# Patient Record
Sex: MALE | Race: WHITE | NOT HISPANIC OR LATINO | Employment: OTHER | URBAN - METROPOLITAN AREA
[De-identification: names, ages, dates, MRNs, and addresses within clinical notes are randomized per-mention and may not be internally consistent; named-entity substitution may affect disease eponyms.]

---

## 2017-01-26 ENCOUNTER — APPOINTMENT (OUTPATIENT)
Dept: SLEEP MEDICINE | Facility: HOSPITAL | Age: 67
End: 2017-01-26

## 2017-01-27 ENCOUNTER — OFFICE VISIT (OUTPATIENT)
Dept: CARDIOLOGY | Facility: CLINIC | Age: 67
End: 2017-01-27

## 2017-01-27 VITALS
HEART RATE: 110 BPM | WEIGHT: 206 LBS | DIASTOLIC BLOOD PRESSURE: 76 MMHG | BODY MASS INDEX: 27.9 KG/M2 | SYSTOLIC BLOOD PRESSURE: 118 MMHG | HEIGHT: 72 IN

## 2017-01-27 DIAGNOSIS — I10 ESSENTIAL HYPERTENSION: Primary | ICD-10-CM

## 2017-01-27 DIAGNOSIS — G47.33 OBSTRUCTIVE SLEEP APNEA SYNDROME: ICD-10-CM

## 2017-01-27 DIAGNOSIS — E78.5 HYPERLIPIDEMIA, UNSPECIFIED HYPERLIPIDEMIA TYPE: ICD-10-CM

## 2017-01-27 DIAGNOSIS — E11.9 TYPE 2 DIABETES MELLITUS WITHOUT COMPLICATION, WITHOUT LONG-TERM CURRENT USE OF INSULIN (HCC): ICD-10-CM

## 2017-01-27 PROCEDURE — 93000 ELECTROCARDIOGRAM COMPLETE: CPT | Performed by: INTERNAL MEDICINE

## 2017-01-27 PROCEDURE — 99214 OFFICE O/P EST MOD 30 MIN: CPT | Performed by: INTERNAL MEDICINE

## 2017-01-27 RX ORDER — LISINOPRIL 10 MG/1
10 TABLET ORAL DAILY
COMMUNITY
End: 2017-04-21 | Stop reason: SDUPTHER

## 2017-01-27 NOTE — MR AVS SNAPSHOT
Mike Cox   1/27/2017 1:00 PM   Office Visit    Dept Phone:  [phone]   Encounter #:  [id number]    Provider:  Isabelle Flores MD   Department:  [facility] CARDIOLOGY                Your Full Care Plan              Today's Medication Changes          These changes are accurate as of: 1/27/17  1:33 PM.  If you have any questions, ask your nurse or doctor.               Medication(s)that have changed:     Empagliflozin 25 MG tablet   Take  by mouth.   What changed:  Another medication with the same name was removed. Continue taking this medication, and follow the directions you see here.   Changed by:  Isabelle Flores MD       lisinopril 10 MG tablet   Commonly known as:  PRINIVIL,ZESTRIL   Take 10 mg by mouth Daily.   What changed:  Another medication with the same name was removed. Continue taking this medication, and follow the directions you see here.   Changed by:  Isabelle Flores MD       LOVAZA PO   Take 4 mg by mouth daily.   What changed:  Another medication with the same name was removed. Continue taking this medication, and follow the directions you see here.   Changed by:  Svetlana Ornelas MD         Stop taking medication(s)listed here:     Lidocaine (Anorectal) 5 % cream cream   Commonly known as:  LMX 5   Stopped by:  Isabelle Flores MD           sulfamethoxazole-trimethoprim 800-160 MG per tablet   Commonly known as:  BACTRIM DS   Stopped by:  Isabelle Flores MD                      Your Updated Medication List          This list is accurate as of: 1/27/17  1:33 PM.  Always use your most recent med list.                albuterol 108 (90 BASE) MCG/ACT inhaler   Commonly known as:  PROVENTIL HFA;VENTOLIN HFA   Inhale 2 puffs Every 4 (Four) Hours As Needed for wheezing.       ASPIRIN ADULT LOW STRENGTH 81 MG EC tablet   Generic drug:  aspirin       atorvastatin 80 MG tablet   Commonly known as:  LIPITOR   TAKE 1  TABLET BY MOUTH AT BEDTIME       Empagliflozin 25 MG tablet       glimepiride 4 MG tablet   Commonly known as:  AMARYL       JENTADUETO 2.5-1000 MG tablet   Generic drug:  Linagliptin-Metformin HCl       lisinopril 10 MG tablet   Commonly known as:  PRINIVIL,ZESTRIL       LOVAZA PO       VITAMIN B12 PO       Vitamin D3 2000 UNITS tablet               We Performed the Following     ECG 12 Lead       You Were Diagnosed With        Codes Comments    Essential hypertension    -  Primary ICD-10-CM: I10  ICD-9-CM: 401.9     Hyperlipidemia, unspecified hyperlipidemia type     ICD-10-CM: E78.5  ICD-9-CM: 272.4     Obstructive sleep apnea syndrome     ICD-10-CM: G47.33  ICD-9-CM: 327.23     Type 2 diabetes mellitus without complication, without long-term current use of insulin     ICD-10-CM: E11.9  ICD-9-CM: 250.00       Instructions     None    Patient Instructions History      Upcoming Appointments     Visit Type Date Time Department    FOLLOW UP 2017  1:00 PM [facility]    POLYSOMNOGRAM 2017  8:30 PM [facility]EP [facility]    OFFICE VISIT 3/15/2017 10:45 AM [facility]    FOLLOW UP 2017  1:00 PM [facility]      [employer] Signup     [facility] Hadapt allows you to send messages to your doctor, view your test results, renew your prescriptions, schedule appointments, and more. To sign up, go to [employer]ET[employer] and click on the Sign Up Now link in the New User? box. Enter your Hadapt Activation Code exactly as it appears below along with the last four digits of your Social Security Number and your Date of Birth () to complete the sign-up process. If you do not sign up before the expiration date, you must request a new code.    Hadapt Activation Code: [email or web address]  Expires: 2017  5:36 AM    If you have questions, you can email [email or web address] or call [phone] to talk to our Hadapt staff. Remember, Hadapt is NOT to be used for urgent needs. For  "medical emergencies, dial 911.               Other Info from Your Visit           Your Appointments     Feb 12, 2017  8:30 PM EST   [facility]   [facility] ([place])    [address] [facility] [address] [phone]            Mar 15, 2017 10:45 AM EDT   Office Visit with Allyson Salas MD   [facility] PRIMARY CARE (--)    [address]  [place] [facility] [address] [phone]           Arrive 15 minutes prior to appointment.            Jul 28, 2017  1:00 PM EDT   Follow Up with Isabelle Flores MD   [facility] CARDIOLOGY (--)    [address]  [facility] [address] [phone]           Arrive 15 minutes prior to appointment.              Allergies     No Known Allergies      Vital Signs     Blood Pressure Pulse Height Weight Body Mass Index Smoking Status    118/76 110 72\" (182.9 cm) 206 lb (93.4 kg) 27.94 kg/m2 Former Smoker      Problems and Diagnoses Noted     High cholesterol or triglycerides    High blood pressure    Sleep apnea    Type 2 diabetes      Results         "

## 2017-01-27 NOTE — PROGRESS NOTES
Date of Office Visit: 2017  Encounter Provider: Isabelle Flores MD  Place of Service: [facility] CARDIOLOGY  Patient Name: Mike Cox  :1950      Patient ID:  Mike Cox is a 66 y.o. male is here for  followup for CAD.         History of Present Illness  He has hypertension, hyperlipidemia, diabetes mellitus type 2, and obstructive sleep  apnea. He first presented to the office for chest pain. Because of his cardiovascular  risks, he had an exercise nuclear stress study done in  which was negative for  ischemia. He had a lot of fatigue at that time and palpitations which were treated and  got better. His Holter recording done in 2008 showed premature ventricular complexes.   He had vascular screening done on 2009 which was normal. He had a cardiac duplex   study performed on 2011 which showed moderate intimal thickening of the right   common carotid artery with mild thickening of that carotid bulb but no thickening of his stenosis.   He presented in 2008 with fatigue and had a stress nuclear perfusion study showing no evidence   of ischemia. His echocardiogram then showed an ejection fraction of 65% with grade I diastolic dysfunction  and no significant valvular heart problems.      His last echocardiogram done 2011, showed an ejection fraction of 54% with grade 1  diastolic dysfunction and no significant valvular heart problems. He had a stress nuclear 2012  at [facility] and had no ischemia.     He sees Dr. Ireland from endocrinology for his lipid management. He did see Dr. Sampson for   PAULETTE and COPD.        He had some mild dyspnea with exertion at his last visit and called and said it  was getting even worse, so he had a stress nuclear perfusion study done on 2015,  showing no evidence of ischemia and ejection fraction of 57%. He had a 2-D echocardiogram  with Doppler done on 2014, showing an ejection fraction of  54% and grade 1 diastolic  dysfunction. He had carotid duplex studies done on 04/28/2015, which showed moderate  intimal thickening of the right internal carotid artery and moderate plaque in the left  internal carotid artery.     He had vascular screening done on 03/20/2016, which showed plaquing in both carotid bulbs but it was otherwise unremarkable.        He had labs on 12/08/2016 which revealed a hemoglobin A1c of 7.43, normal CMP, total cholesterol 121, triglycerides 123, HDL 45, LDL 49 and CBC normal.       He is not exercising.  His weight has been stable.  His hemoglobin A1c continues to be elevated, so they are about to9 start him on insulin.  He has had no tachycardia, dizziness, or syncope.  He has no lower extremity edema, orthopnea, or paroxysmal nocturnal dyspnea.  He has dyspnea on exertion, specifically going up stairs or inclines, but part of that is because he is not exercising and he is very deconditioned.  He has no nausea, vomiting, fevers, or chills.  He has, overall, been stable.  He has had a lot of fatigue.  Dr. Sampson is retired and so he is in the process of getting another sleep study with another pulmonologist.          Past Medical History   Diagnosis Date   • Diabetes mellitus      pt reports A1C of 7.1 in 3/2016   • High cholesterol    • History of hepatitis C      In remission after Interferon and Ribavirin treatment   • Hypertension    • Vitamin D deficiency          Past Surgical History   Procedure Laterality Date   • Colonoscopy N/A 09/10/2013     Normal, Repeat in 10 years, Dr. Ornelas   • Shoulder surgery  1995     BONE SPURS       Current Outpatient Prescriptions on File Prior to Visit   Medication Sig Dispense Refill   • albuterol (PROVENTIL HFA;VENTOLIN HFA) 108 (90 BASE) MCG/ACT inhaler Inhale 2 puffs Every 4 (Four) Hours As Needed for wheezing. 8 g 0   • aspirin (ASPIRIN ADULT LOW STRENGTH) 81 MG EC tablet Take 1 tablet by mouth daily.     • atorvastatin (LIPITOR) 80 MG  tablet TAKE 1 TABLET BY MOUTH AT BEDTIME 90 tablet 2   • Cholecalciferol (VITAMIN D3) 2000 UNITS tablet Take 2,000 Units by mouth daily.     • Cyanocobalamin (VITAMIN B12 PO) Take 5,000 mcg by mouth daily.     • glimepiride (AMARYL) 4 MG tablet Take 1 tablet by mouth 2 (two) times a day before meals.  5   • Linagliptin-Metformin HCl (JENTADUETO) 2.5-1000 MG tablet Take 1 tablet by mouth 2 (two) times a day.     • Omega-3-acid Ethyl Esters (LOVAZA PO) Take 4 mg by mouth daily.     • [DISCONTINUED] Empagliflozin 10 MG tablet Take  by mouth.     • [DISCONTINUED] Lidocaine, Anorectal, (LMX 5) 5 % cream cream Apply  topically 3 (three) times a day as needed (pain or itching). 15 g 0   • [DISCONTINUED] lisinopril (PRINIVIL,ZESTRIL) 20 MG tablet TAKE 1/2 TABLET BY MOUTH EVERY MORNING 90 tablet 0   • [DISCONTINUED] Omega-3-acid Ethyl Esters (LOVAZA PO) Take 4 mg by mouth.     • [DISCONTINUED] sulfamethoxazole-trimethoprim (BACTRIM DS) 800-160 MG per tablet Take 1 tablet by mouth 2 (Two) Times a Day. 14 tablet 0     No current facility-administered medications on file prior to visit.        Social History     Social History   • Marital status:      Spouse name: N/A   • Number of children: N/A   • Years of education: N/A     Occupational History   • Not on file.     Social History Main Topics   • Smoking status: Former Smoker     Packs/day: 1.00     Years: 20.00     Types: Cigarettes     Start date: 1966     Quit date: 1986   • Smokeless tobacco: Never Used   • Alcohol use No   • Drug use: No   • Sexual activity: Not on file     Other Topics Concern   • Not on file     Social History Narrative           Review of Systems   Constitution: Negative.   HENT: Negative for congestion and headaches.    Eyes: Negative for vision loss in left eye and vision loss in right eye.   Respiratory: Negative.  Negative for cough, hemoptysis, shortness of breath, sleep disturbances due to breathing, snoring, sputum production and  "wheezing.    Endocrine: Negative.    Hematologic/Lymphatic: Negative.    Skin: Negative for poor wound healing and rash.   Musculoskeletal: Negative for falls, gout, muscle cramps and myalgias.   Gastrointestinal: Negative for abdominal pain, diarrhea, dysphagia, hematemesis, melena, nausea and vomiting.   Neurological: Negative for excessive daytime sleepiness, dizziness, light-headedness, loss of balance, seizures and vertigo.   Psychiatric/Behavioral: Negative for depression and substance abuse. The patient is not nervous/anxious.        Procedures    ECG 12 Lead  Date/Time: 1/27/2017 1:17 PM  Performed by: JOSE CHANG  Authorized by: JOSE CHANG   Rhythm: sinus tachycardia  Conduction: LAFB  Clinical impression: abnormal ECG               Objective:      Vitals:    01/27/17 1305   BP: 118/76   Pulse: 110   Weight: 206 lb (93.4 kg)   Height: 72\" (182.9 cm)     Body mass index is 27.94 kg/(m^2).    Physical Exam   Constitutional: He is oriented to person, place, and time. He appears well-developed and well-nourished. No distress.   HENT:   Head: Normocephalic and atraumatic.   Eyes: Conjunctivae are normal. No scleral icterus.   Neck: Neck supple. No JVD present. Carotid bruit is not present. No thyromegaly present.   Cardiovascular: Normal rate, regular rhythm, S1 normal, S2 normal, normal heart sounds and intact distal pulses.   No extrasystoles are present. PMI is not displaced.  Exam reveals no gallop.    No murmur heard.  Pulses:       Carotid pulses are 2+ on the right side, and 2+ on the left side.       Radial pulses are 2+ on the right side, and 2+ on the left side.        Dorsalis pedis pulses are 2+ on the right side, and 2+ on the left side.        Posterior tibial pulses are 2+ on the right side, and 2+ on the left side.   Pulmonary/Chest: Effort normal and breath sounds normal. No respiratory distress. He has no wheezes. He has no rhonchi. He has no rales. He exhibits no tenderness. "   Abdominal: Soft. Bowel sounds are normal. He exhibits no distension, no abdominal bruit and no mass. There is no tenderness.   Musculoskeletal: He exhibits no edema or deformity.   Lymphadenopathy:     He has no cervical adenopathy.   Neurological: He is alert and oriented to person, place, and time. No cranial nerve deficit.   Skin: Skin is warm and dry. No rash noted. He is not diaphoretic. No cyanosis. No pallor. Nails show no clubbing.   Psychiatric: He has a normal mood and affect. Judgment normal.   Vitals reviewed.      Lab Review:       Assessment:      Diagnosis Plan   1. Essential hypertension     2. Hyperlipidemia, unspecified hyperlipidemia type     3. Obstructive sleep apnea syndrome     4. Type 2 diabetes mellitus without complication, without long-term current use of insulin          1. Normal stress perfusion study 8/2012 and 1/2015.  2. Abnormal ECG. Left anterior fascicular block which is chronic.  3. Hypertension, under good control.  4. Diabetes mellitus type 2. Stable.  5. Obstructive sleep apnea on CPAP. Getting retested for this.  6. Hyperlipidemia.   7. Hepatitis C, stable.  8. History of rheumatoid fever, stable. No valvular heart problems.   9. History of gout.  10. Plaquing of the left carotid artery.  11. Fatigue, likely due to diabetes and meds.      Plan:       See back in 6 months, no med changes, advised exercise.

## 2017-02-08 ENCOUNTER — TRANSCRIBE ORDERS (OUTPATIENT)
Dept: SLEEP MEDICINE | Facility: HOSPITAL | Age: 67
End: 2017-02-08

## 2017-02-08 DIAGNOSIS — G47.33 OSA (OBSTRUCTIVE SLEEP APNEA): Primary | ICD-10-CM

## 2017-02-12 ENCOUNTER — HOSPITAL ENCOUNTER (OUTPATIENT)
Dept: SLEEP MEDICINE | Facility: HOSPITAL | Age: 67
Discharge: HOME | End: 2017-02-12
Admitting: INTERNAL MEDICINE

## 2017-02-12 DIAGNOSIS — G47.33 OSA (OBSTRUCTIVE SLEEP APNEA): ICD-10-CM

## 2017-02-12 PROCEDURE — 95810 POLYSOM 6/> YRS 4/> PARAM: CPT

## 2017-03-13 DIAGNOSIS — I15.9 SECONDARY HYPERTENSION: Primary | ICD-10-CM

## 2017-03-13 DIAGNOSIS — I15.9 SECONDARY HYPERTENSION: ICD-10-CM

## 2017-03-13 DIAGNOSIS — E78.5 HYPERLIPIDEMIA, UNSPECIFIED HYPERLIPIDEMIA TYPE: ICD-10-CM

## 2017-03-13 DIAGNOSIS — E11.9 TYPE 2 DIABETES MELLITUS WITHOUT COMPLICATION, WITHOUT LONG-TERM CURRENT USE OF INSULIN (HCC): ICD-10-CM

## 2017-03-27 ENCOUNTER — TELEPHONE (OUTPATIENT)
Dept: SLEEP MEDICINE | Facility: HOSPITAL | Age: 67
End: 2017-03-27

## 2017-03-27 NOTE — TELEPHONE ENCOUNTER
Spoke with [facility].  Pt s/u to f/u @ [facility] on 3/29/17.  Study info faxed to [facility] as well.

## 2017-04-18 LAB
ALBUMIN SERPL-MCNC: 4.5 G/DL (ref 3.5–5.2)
ALBUMIN/GLOB SERPL: 1.9 G/DL
ALP SERPL-CCNC: 94 U/L (ref 39–117)
ALT SERPL-CCNC: 28 U/L (ref 1–41)
AST SERPL-CCNC: 42 U/L (ref 1–40)
BASOPHILS # BLD AUTO: 0.02 10*3/MM3 (ref 0–0.2)
BASOPHILS NFR BLD AUTO: 0.3 % (ref 0–1.5)
BILIRUB SERPL-MCNC: 0.7 MG/DL (ref 0.1–1.2)
BUN SERPL-MCNC: 9 MG/DL (ref 8–23)
BUN/CREAT SERPL: 10.7 (ref 7–25)
CALCIUM SERPL-MCNC: 9.4 MG/DL (ref 8.6–10.5)
CHLORIDE SERPL-SCNC: 98 MMOL/L (ref 98–107)
CHOLEST SERPL-MCNC: 120 MG/DL (ref 0–200)
CO2 SERPL-SCNC: 26.1 MMOL/L (ref 22–29)
CREAT SERPL-MCNC: 0.84 MG/DL (ref 0.76–1.27)
EOSINOPHIL # BLD AUTO: 0.27 10*3/MM3 (ref 0–0.7)
EOSINOPHIL NFR BLD AUTO: 4.3 % (ref 0.3–6.2)
ERYTHROCYTE [DISTWIDTH] IN BLOOD BY AUTOMATED COUNT: 13.6 % (ref 11.5–14.5)
GLOBULIN SER CALC-MCNC: 2.4 GM/DL
GLUCOSE SERPL-MCNC: 170 MG/DL (ref 65–99)
HBA1C MFR BLD: 7 % (ref 4.8–5.6)
HCT VFR BLD AUTO: 46.2 % (ref 40.4–52.2)
HDLC SERPL-MCNC: 47 MG/DL (ref 40–60)
HGB BLD-MCNC: 15.4 G/DL (ref 13.7–17.6)
IMM GRANULOCYTES # BLD: 0 10*3/MM3 (ref 0–0.03)
IMM GRANULOCYTES NFR BLD: 0 % (ref 0–0.5)
LDLC SERPL CALC-MCNC: 51 MG/DL (ref 0–100)
LDLC/HDLC SERPL: 1.09 {RATIO}
LYMPHOCYTES # BLD AUTO: 1.65 10*3/MM3 (ref 0.9–4.8)
LYMPHOCYTES NFR BLD AUTO: 26.1 % (ref 19.6–45.3)
MCH RBC QN AUTO: 30.1 PG (ref 27–32.7)
MCHC RBC AUTO-ENTMCNC: 33.3 G/DL (ref 32.6–36.4)
MCV RBC AUTO: 90.4 FL (ref 79.8–96.2)
MONOCYTES # BLD AUTO: 0.69 10*3/MM3 (ref 0.2–1.2)
MONOCYTES NFR BLD AUTO: 10.9 % (ref 5–12)
NEUTROPHILS # BLD AUTO: 3.7 10*3/MM3 (ref 1.9–8.1)
NEUTROPHILS NFR BLD AUTO: 58.4 % (ref 42.7–76)
PLATELET # BLD AUTO: 214 10*3/MM3 (ref 140–500)
POTASSIUM SERPL-SCNC: 4.3 MMOL/L (ref 3.5–5.2)
PROT SERPL-MCNC: 6.9 G/DL (ref 6–8.5)
RBC # BLD AUTO: 5.11 10*6/MM3 (ref 4.6–6)
SODIUM SERPL-SCNC: 140 MMOL/L (ref 136–145)
TRIGL SERPL-MCNC: 109 MG/DL (ref 0–150)
VLDLC SERPL CALC-MCNC: 21.8 MG/DL (ref 5–40)
WBC # BLD AUTO: 6.33 10*3/MM3 (ref 4.5–10.7)

## 2017-04-21 ENCOUNTER — OFFICE VISIT (OUTPATIENT)
Dept: FAMILY MEDICINE CLINIC | Facility: CLINIC | Age: 67
End: 2017-04-21

## 2017-04-21 VITALS
WEIGHT: 213.7 LBS | HEART RATE: 82 BPM | DIASTOLIC BLOOD PRESSURE: 96 MMHG | BODY MASS INDEX: 28.32 KG/M2 | OXYGEN SATURATION: 98 % | HEIGHT: 73 IN | SYSTOLIC BLOOD PRESSURE: 145 MMHG | TEMPERATURE: 97 F

## 2017-04-21 DIAGNOSIS — E78.5 HYPERLIPIDEMIA, UNSPECIFIED HYPERLIPIDEMIA TYPE: Primary | ICD-10-CM

## 2017-04-21 DIAGNOSIS — B18.2 CHRONIC HEPATITIS C WITHOUT HEPATIC COMA (HCC): ICD-10-CM

## 2017-04-21 DIAGNOSIS — E11.9 TYPE 2 DIABETES MELLITUS WITHOUT COMPLICATION, WITHOUT LONG-TERM CURRENT USE OF INSULIN (HCC): ICD-10-CM

## 2017-04-21 DIAGNOSIS — R79.89 ELEVATED LFTS: ICD-10-CM

## 2017-04-21 DIAGNOSIS — I10 ESSENTIAL HYPERTENSION: ICD-10-CM

## 2017-04-21 PROCEDURE — 99214 OFFICE O/P EST MOD 30 MIN: CPT | Performed by: INTERNAL MEDICINE

## 2017-04-21 RX ORDER — BLOOD SUGAR DIAGNOSTIC
STRIP MISCELLANEOUS
Refills: 6 | COMMUNITY
Start: 2017-02-08

## 2017-04-21 RX ORDER — CHLORHEXIDINE GLUCONATE 0.12 MG/ML
RINSE ORAL
Refills: 2 | COMMUNITY
Start: 2017-04-10 | End: 2017-05-19

## 2017-04-21 RX ORDER — TAMSULOSIN HYDROCHLORIDE 0.4 MG/1
CAPSULE ORAL
Refills: 3 | COMMUNITY
Start: 2017-03-23 | End: 2017-05-19 | Stop reason: SDUPTHER

## 2017-04-21 RX ORDER — MELOXICAM 15 MG/1
TABLET ORAL
Refills: 2 | COMMUNITY
Start: 2017-04-10 | End: 2017-05-19 | Stop reason: SDUPTHER

## 2017-04-21 RX ORDER — INSULIN GLARGINE 100 [IU]/ML
12 INJECTION, SOLUTION SUBCUTANEOUS NIGHTLY
Refills: 5 | COMMUNITY
Start: 2017-02-28 | End: 2018-06-20 | Stop reason: ALTCHOICE

## 2017-04-21 RX ORDER — PEN NEEDLE, DIABETIC 32GX 5/32"
NEEDLE, DISPOSABLE MISCELLANEOUS
Refills: 5 | COMMUNITY
Start: 2017-02-28 | End: 2018-06-20 | Stop reason: ALTCHOICE

## 2017-04-21 RX ORDER — BUPROPION HYDROCHLORIDE 150 MG/1
150 TABLET ORAL EVERY MORNING
Qty: 90 TABLET | Refills: 2 | Status: SHIPPED | OUTPATIENT
Start: 2017-04-21 | End: 2017-10-25

## 2017-04-21 RX ORDER — LANCETS
EACH MISCELLANEOUS
Refills: 5 | COMMUNITY
Start: 2017-03-02

## 2017-04-21 RX ORDER — LISINOPRIL 10 MG/1
10 TABLET ORAL DAILY
Qty: 90 TABLET | Refills: 1 | Status: SHIPPED | OUTPATIENT
Start: 2017-04-21 | End: 2017-10-10 | Stop reason: DRUGHIGH

## 2017-04-21 RX ORDER — ATORVASTATIN CALCIUM 20 MG/1
20 TABLET, FILM COATED ORAL DAILY
Qty: 90 TABLET | Refills: 1 | Status: SHIPPED | OUTPATIENT
Start: 2017-04-21 | End: 2017-10-09 | Stop reason: SDUPTHER

## 2017-04-21 RX ORDER — BUPROPION HYDROCHLORIDE 150 MG/1
TABLET ORAL
Refills: 2 | COMMUNITY
Start: 2017-03-24 | End: 2017-04-21 | Stop reason: SDUPTHER

## 2017-04-21 NOTE — PROGRESS NOTES
Subjective   Mike Cox is a 66 y.o. male who comes in today for   Chief Complaint   Patient presents with   • Diabetes     no complaints he did say he sees an endo he did start insulin   .    History of Present Illness   Here for f/u on HTN, HL and depression; Hep C + now in remission who on recent labs had LFT elevated.  He did have a bad gastroenteritis 3 days prior to labs..  Recent over night sleep study showed hypoxia and is followed by pulm and getting set up for overnight oxygen but doesn't have PAULETTE.  wellbutrin working well for depressive fatigue.  Feels happy.  psa is followed by urology and he checks prostate as well.  Needs some refills  The following portions of the patient's history were reviewed and updated as appropriate: allergies, current medications, past family history, past medical history, past social history, past surgical history and problem list.    Review of Systems   Constitutional: Negative.    Gastrointestinal: Positive for vomiting (over the weekend due to virus but is fine now).   Psychiatric/Behavioral: Negative.        Vitals:    04/21/17 1041   BP: 145/96   Pulse: 82   Temp: 97 °F (36.1 °C)   SpO2: 98%       Objective   Physical Exam   Constitutional: He is oriented to person, place, and time. He appears well-developed and well-nourished.   HENT:   Head: Normocephalic and atraumatic.   Right Ear: External ear normal.   Left Ear: External ear normal.   Mouth/Throat: Oropharynx is clear and moist.   Eyes: Conjunctivae are normal.   Neck: Neck supple.   Cardiovascular: Normal rate, regular rhythm and normal heart sounds.    Pulmonary/Chest: Effort normal and breath sounds normal.   Abdominal: Soft. Bowel sounds are normal.   Neurological: He is alert and oriented to person, place, and time.   Skin: Skin is warm.   Psychiatric: He has a normal mood and affect. His behavior is normal. Judgment and thought content normal.   Nursing note and vitals reviewed.      Assessment/Plan    Mike was seen today for diabetes.    Diagnoses and all orders for this visit:    Hyperlipidemia, unspecified hyperlipidemia type    Essential hypertension    Type 2 diabetes mellitus without complication, without long-term current use of insulin    Other orders  -     atorvastatin (LIPITOR) 20 MG tablet; Take 1 tablet by mouth Daily.  -     buPROPion XL (WELLBUTRIN XL) 150 MG 24 hr tablet; Take 1 tablet by mouth Every Morning.  -     lisinopril (PRINIVIL,ZESTRIL) 10 MG tablet; Take 1 tablet by mouth Daily.    decrease lipitor from 80 to 20mg qd due to labs looking so good and LFT elevation  Recheck cmp in 2 mo  Think it is related to recent stomach virus  Refill other meds and HTN and depression stable               I have asked for the patient to return to clinic in 6month(s).

## 2017-04-22 PROBLEM — R79.89 ELEVATED LFTS: Status: ACTIVE | Noted: 2017-04-22

## 2017-04-26 RX ORDER — LISINOPRIL 10 MG/1
10 TABLET ORAL DAILY
Qty: 90 TABLET | Refills: 0 | Status: SHIPPED | OUTPATIENT
Start: 2017-04-26 | End: 2017-05-19 | Stop reason: SDUPTHER

## 2017-05-17 ENCOUNTER — APPOINTMENT (OUTPATIENT)
Dept: PREADMISSION TESTING | Facility: HOSPITAL | Age: 67
End: 2017-05-17

## 2017-05-18 ENCOUNTER — TELEPHONE (OUTPATIENT)
Dept: CARDIOLOGY | Facility: CLINIC | Age: 67
End: 2017-05-18

## 2017-05-19 ENCOUNTER — APPOINTMENT (OUTPATIENT)
Dept: PREADMISSION TESTING | Facility: HOSPITAL | Age: 67
End: 2017-05-19

## 2017-05-19 VITALS
BODY MASS INDEX: 28.36 KG/M2 | DIASTOLIC BLOOD PRESSURE: 89 MMHG | WEIGHT: 209.4 LBS | OXYGEN SATURATION: 97 % | TEMPERATURE: 97.9 F | HEART RATE: 96 BPM | RESPIRATION RATE: 16 BRPM | SYSTOLIC BLOOD PRESSURE: 139 MMHG | HEIGHT: 72 IN

## 2017-05-19 LAB
ANION GAP SERPL CALCULATED.3IONS-SCNC: 17 MMOL/L
BUN BLD-MCNC: 13 MG/DL (ref 8–23)
BUN/CREAT SERPL: 14 (ref 7–25)
CALCIUM SPEC-SCNC: 9.7 MG/DL (ref 8.6–10.5)
CHLORIDE SERPL-SCNC: 96 MMOL/L (ref 98–107)
CO2 SERPL-SCNC: 25 MMOL/L (ref 22–29)
CREAT BLD-MCNC: 0.93 MG/DL (ref 0.76–1.27)
DEPRECATED RDW RBC AUTO: 42.1 FL (ref 37–54)
ERYTHROCYTE [DISTWIDTH] IN BLOOD BY AUTOMATED COUNT: 13.3 % (ref 11.5–14.5)
GFR SERPL CREATININE-BSD FRML MDRD: 81 ML/MIN/1.73
GLUCOSE BLD-MCNC: 96 MG/DL (ref 65–99)
HCT VFR BLD AUTO: 44.9 % (ref 40.4–52.2)
HGB BLD-MCNC: 15.9 G/DL (ref 13.7–17.6)
MCH RBC QN AUTO: 30.7 PG (ref 27–32.7)
MCHC RBC AUTO-ENTMCNC: 35.4 G/DL (ref 32.6–36.4)
MCV RBC AUTO: 86.7 FL (ref 79.8–96.2)
PLATELET # BLD AUTO: 226 10*3/MM3 (ref 140–500)
PMV BLD AUTO: 9.7 FL (ref 6–12)
POTASSIUM BLD-SCNC: 4.6 MMOL/L (ref 3.5–5.2)
RBC # BLD AUTO: 5.18 10*6/MM3 (ref 4.6–6)
SODIUM BLD-SCNC: 138 MMOL/L (ref 136–145)
WBC NRBC COR # BLD: 11.6 10*3/MM3 (ref 4.5–10.7)

## 2017-05-19 PROCEDURE — 36415 COLL VENOUS BLD VENIPUNCTURE: CPT

## 2017-05-19 PROCEDURE — 85027 COMPLETE CBC AUTOMATED: CPT | Performed by: ORTHOPAEDIC SURGERY

## 2017-05-19 PROCEDURE — 80048 BASIC METABOLIC PNL TOTAL CA: CPT | Performed by: ORTHOPAEDIC SURGERY

## 2017-05-19 RX ORDER — MELOXICAM 15 MG/1
15 TABLET ORAL DAILY
COMMUNITY
End: 2017-10-25

## 2017-05-19 RX ORDER — TAMSULOSIN HYDROCHLORIDE 0.4 MG/1
2 CAPSULE ORAL DAILY
COMMUNITY
End: 2018-04-23

## 2017-05-22 ENCOUNTER — TELEPHONE (OUTPATIENT)
Dept: FAMILY MEDICINE CLINIC | Facility: CLINIC | Age: 67
End: 2017-05-22

## 2017-05-24 DIAGNOSIS — D72.829 LEUKOCYTOSIS, UNSPECIFIED TYPE: Primary | ICD-10-CM

## 2017-05-25 LAB
BASOPHILS # BLD AUTO: 0.06 10*3/MM3 (ref 0–0.2)
BASOPHILS NFR BLD AUTO: 0.6 % (ref 0–1.5)
EOSINOPHIL # BLD AUTO: 0.29 10*3/MM3 (ref 0–0.7)
EOSINOPHIL NFR BLD AUTO: 3 % (ref 0.3–6.2)
ERYTHROCYTE [DISTWIDTH] IN BLOOD BY AUTOMATED COUNT: 13.4 % (ref 11.5–14.5)
HCT VFR BLD AUTO: 47.3 % (ref 40.4–52.2)
HGB BLD-MCNC: 16.3 G/DL (ref 13.7–17.6)
IMM GRANULOCYTES # BLD: 0.07 10*3/MM3 (ref 0–0.03)
IMM GRANULOCYTES NFR BLD: 0.7 % (ref 0–0.5)
LYMPHOCYTES # BLD AUTO: 2.04 10*3/MM3 (ref 0.9–4.8)
LYMPHOCYTES NFR BLD AUTO: 21.3 % (ref 19.6–45.3)
MCH RBC QN AUTO: 31 PG (ref 27–32.7)
MCHC RBC AUTO-ENTMCNC: 34.5 G/DL (ref 32.6–36.4)
MCV RBC AUTO: 89.9 FL (ref 79.8–96.2)
MONOCYTES # BLD AUTO: 0.81 10*3/MM3 (ref 0.2–1.2)
MONOCYTES NFR BLD AUTO: 8.4 % (ref 5–12)
NEUTROPHILS # BLD AUTO: 6.33 10*3/MM3 (ref 1.9–8.1)
NEUTROPHILS NFR BLD AUTO: 66 % (ref 42.7–76)
PLATELET # BLD AUTO: 230 10*3/MM3 (ref 140–500)
RBC # BLD AUTO: 5.26 10*6/MM3 (ref 4.6–6)
WBC # BLD AUTO: 9.6 10*3/MM3 (ref 4.5–10.7)

## 2017-05-30 ENCOUNTER — TELEPHONE (OUTPATIENT)
Dept: CARDIOLOGY | Facility: CLINIC | Age: 67
End: 2017-05-30

## 2017-06-01 ENCOUNTER — TRANSCRIBE ORDERS (OUTPATIENT)
Dept: ADMINISTRATIVE | Facility: HOSPITAL | Age: 67
End: 2017-06-01

## 2017-06-01 ENCOUNTER — TRANSCRIBE ORDERS (OUTPATIENT)
Dept: CARDIOLOGY | Facility: CLINIC | Age: 67
End: 2017-06-01

## 2017-06-01 DIAGNOSIS — Z13.9 SCREENING: Primary | ICD-10-CM

## 2017-06-06 ENCOUNTER — HOSPITAL ENCOUNTER (OUTPATIENT)
Dept: CARDIOLOGY | Facility: HOSPITAL | Age: 67
Discharge: HOME | End: 2017-06-06
Admitting: INTERNAL MEDICINE

## 2017-06-06 VITALS
HEART RATE: 108 BPM | BODY MASS INDEX: 27.43 KG/M2 | DIASTOLIC BLOOD PRESSURE: 82 MMHG | SYSTOLIC BLOOD PRESSURE: 122 MMHG | HEIGHT: 73 IN | WEIGHT: 207 LBS

## 2017-06-06 DIAGNOSIS — Z13.9 SCREENING: ICD-10-CM

## 2017-06-06 LAB
BH CV ECHO MEAS - DIST AO DIAM: 1.57 CM
BH CV VAS BP LEFT ARM: NORMAL MMHG
BH CV VAS BP RIGHT ARM: NORMAL MMHG
BH CV XLRA MEAS - MID AO DIAM: 1.7 CM
BH CV XLRA MEAS - PAD LEFT ABI DP: 1.08
BH CV XLRA MEAS - PAD LEFT ABI PT: 1.11
BH CV XLRA MEAS - PAD LEFT ARM: 122 MMHG
BH CV XLRA MEAS - PAD LEFT LEG DP: 132 MMHG
BH CV XLRA MEAS - PAD LEFT LEG PT: 136 MMHG
BH CV XLRA MEAS - PAD RIGHT ABI DP: 1.03
BH CV XLRA MEAS - PAD RIGHT ABI PT: 1.06
BH CV XLRA MEAS - PAD RIGHT ARM: 121 MMHG
BH CV XLRA MEAS - PAD RIGHT LEG DP: 126 MMHG
BH CV XLRA MEAS - PAD RIGHT LEG PT: 130 MMHG
BH CV XLRA MEAS - PROX AO DIAM: 2.05 CM
BH CV XLRA MEAS LEFT ICA/CCA RATIO: 0.77
BH CV XLRA MEAS LEFT MID CCA PSV: NORMAL CM/SEC
BH CV XLRA MEAS LEFT MID ICA PSV: NORMAL CM/SEC
BH CV XLRA MEAS LEFT PROX ECA PSV: NORMAL CM/SEC
BH CV XLRA MEAS RIGHT ICA/CCA RATIO: 0.75
BH CV XLRA MEAS RIGHT MID CCA PSV: NORMAL CM/SEC
BH CV XLRA MEAS RIGHT MID ICA PSV: NORMAL CM/SEC
BH CV XLRA MEAS RIGHT PROX ECA PSV: NORMAL CM/SEC

## 2017-06-06 PROCEDURE — 93799 UNLISTED CV SVC/PROCEDURE: CPT

## 2017-06-20 ENCOUNTER — TELEPHONE (OUTPATIENT)
Dept: CARDIOLOGY | Facility: CLINIC | Age: 67
End: 2017-06-20

## 2017-06-20 NOTE — TELEPHONE ENCOUNTER
----- Message from Isabelle Flores MD sent at 6/19/2017  9:57 AM EDT -----  pls call and let him know taht his results look good - no changes.

## 2017-06-21 DIAGNOSIS — R79.89 ELEVATED LFTS: ICD-10-CM

## 2017-06-21 DIAGNOSIS — D72.829 LEUKOCYTOSIS, UNSPECIFIED TYPE: Primary | ICD-10-CM

## 2017-06-22 LAB
ALBUMIN SERPL-MCNC: 4.9 G/DL (ref 3.5–5.2)
ALBUMIN/GLOB SERPL: 2.1 G/DL
ALP SERPL-CCNC: 93 U/L (ref 39–117)
ALT SERPL-CCNC: 22 U/L (ref 1–41)
AST SERPL-CCNC: 22 U/L (ref 1–40)
BASOPHILS # BLD AUTO: 0.04 10*3/MM3 (ref 0–0.2)
BASOPHILS NFR BLD AUTO: 0.5 % (ref 0–1.5)
BILIRUB SERPL-MCNC: 0.6 MG/DL (ref 0.1–1.2)
BUN SERPL-MCNC: 12 MG/DL (ref 8–23)
BUN/CREAT SERPL: 13.6 (ref 7–25)
CALCIUM SERPL-MCNC: 10.1 MG/DL (ref 8.6–10.5)
CHLORIDE SERPL-SCNC: 94 MMOL/L (ref 98–107)
CO2 SERPL-SCNC: 25.5 MMOL/L (ref 22–29)
CREAT SERPL-MCNC: 0.88 MG/DL (ref 0.76–1.27)
EOSINOPHIL # BLD AUTO: 0.26 10*3/MM3 (ref 0–0.7)
EOSINOPHIL NFR BLD AUTO: 3.1 % (ref 0.3–6.2)
ERYTHROCYTE [DISTWIDTH] IN BLOOD BY AUTOMATED COUNT: 13.5 % (ref 11.5–14.5)
GLOBULIN SER CALC-MCNC: 2.3 GM/DL
GLUCOSE SERPL-MCNC: 154 MG/DL (ref 65–99)
HCT VFR BLD AUTO: 45.9 % (ref 40.4–52.2)
HGB BLD-MCNC: 16.1 G/DL (ref 13.7–17.6)
IMM GRANULOCYTES # BLD: 0.05 10*3/MM3 (ref 0–0.03)
IMM GRANULOCYTES NFR BLD: 0.6 % (ref 0–0.5)
LYMPHOCYTES # BLD AUTO: 1.98 10*3/MM3 (ref 0.9–4.8)
LYMPHOCYTES NFR BLD AUTO: 23.4 % (ref 19.6–45.3)
MCH RBC QN AUTO: 30.8 PG (ref 27–32.7)
MCHC RBC AUTO-ENTMCNC: 35.1 G/DL (ref 32.6–36.4)
MCV RBC AUTO: 87.9 FL (ref 79.8–96.2)
MONOCYTES # BLD AUTO: 0.67 10*3/MM3 (ref 0.2–1.2)
MONOCYTES NFR BLD AUTO: 7.9 % (ref 5–12)
NEUTROPHILS # BLD AUTO: 5.45 10*3/MM3 (ref 1.9–8.1)
NEUTROPHILS NFR BLD AUTO: 64.5 % (ref 42.7–76)
PLATELET # BLD AUTO: 248 10*3/MM3 (ref 140–500)
POTASSIUM SERPL-SCNC: 5.2 MMOL/L (ref 3.5–5.2)
PROT SERPL-MCNC: 7.2 G/DL (ref 6–8.5)
RBC # BLD AUTO: 5.22 10*6/MM3 (ref 4.6–6)
SODIUM SERPL-SCNC: 135 MMOL/L (ref 136–145)
WBC # BLD AUTO: 8.45 10*3/MM3 (ref 4.5–10.7)

## 2017-06-23 ENCOUNTER — OFFICE VISIT (OUTPATIENT)
Dept: CARDIOLOGY | Facility: CLINIC | Age: 67
End: 2017-06-23

## 2017-06-23 VITALS
HEIGHT: 72 IN | DIASTOLIC BLOOD PRESSURE: 70 MMHG | BODY MASS INDEX: 28.58 KG/M2 | HEART RATE: 103 BPM | WEIGHT: 211 LBS | SYSTOLIC BLOOD PRESSURE: 126 MMHG

## 2017-06-23 DIAGNOSIS — E78.5 HYPERLIPIDEMIA, UNSPECIFIED HYPERLIPIDEMIA TYPE: ICD-10-CM

## 2017-06-23 DIAGNOSIS — E11.9 TYPE 2 DIABETES MELLITUS WITHOUT COMPLICATION, WITHOUT LONG-TERM CURRENT USE OF INSULIN (HCC): ICD-10-CM

## 2017-06-23 DIAGNOSIS — I10 ESSENTIAL HYPERTENSION: Primary | ICD-10-CM

## 2017-06-23 PROCEDURE — 99214 OFFICE O/P EST MOD 30 MIN: CPT | Performed by: INTERNAL MEDICINE

## 2017-06-23 PROCEDURE — 93000 ELECTROCARDIOGRAM COMPLETE: CPT | Performed by: INTERNAL MEDICINE

## 2017-06-23 NOTE — PROGRESS NOTES
Date of Office Visit: 2017  Encounter Provider: Isabelle Flores MD  Place of Service: [facility] CARDIOLOGY  Patient Name: Mike Cox  :1950      Patient ID:  Mike Cox is a 66 y.o. male is here for  followup for cardiovascular risks.         History of Present Illness    He has hypertension, hyperlipidemia, diabetes mellitus type 2, and obstructive sleep  apnea. He first presented to the office for chest pain. Because of his cardiovascular  risks, he had an exercise nuclear stress study done in  which was negative for  ischemia. He had a lot of fatigue at that time and palpitations which were treated and  got better. His Holter recording done in 2008 showed premature ventricular complexes.   He had vascular screening done on 2009 which was normal. He had a cardiac duplex   study performed on 2011 which showed moderate intimal thickening of the right   common carotid artery with mild thickening of that carotid bulb but no thickening of his stenosis.   He presented in 2008 with fatigue and had a stress nuclear perfusion study showing no evidence   of ischemia. His echocardiogram then showed an ejection fraction of 65% with grade I diastolic dysfunction  and no significant valvular heart problems.       His last echocardiogram done 2011, showed an ejection fraction of 54% with grade 1  diastolic dysfunction and no significant valvular heart problems. He had a stress nuclear 2012  at [facility] and had no ischemia.      He sees Dr. Ireland from endocrinology for his lipid management. He did see Dr. Sampson for   PAULETTE and COPD.          He had some mild dyspnea with exertion at his last visit and called and said it  was getting even worse, so he had a stress nuclear perfusion study done on 2015,  showing no evidence of ischemia and ejection fraction of 57%. He had a 2-D echocardiogram  with Doppler done on 2014, showing  an ejection fraction of 54% and grade 1 diastolic  dysfunction. He had carotid duplex studies done on 04/28/2015, which showed moderate  intimal thickening of the right internal carotid artery and moderate plaque in the left  internal carotid artery.      He had vascular screening done on 03/20/2016, which showed plaquing in both carotid bulbs but it was otherwise unremarkable.       The patient had vascular screening done 06/06/2017, which was normal.      Overall, the patient is doing well. He has no chest pain, difficulty breathing. He has had no tachycardia, dizziness or syncope. He is tolerating his medications well and feels well otherwise. He is to have surgery on his right elbow with Dr. Aguilar Hess on 07/18/2017. I think it is fine to do that. He is not currently exercising. He has no exertional chest tightness or pressure; no difficulty breathing; no tachycardia, dizziness or syncope.               Past Medical History:   Diagnosis Date   • Diabetes mellitus     pt reports A1C of 7.1 in 3/2016   • Heart murmur    • High cholesterol    • History of hepatitis C     In remission after Interferon and Ribavirin treatment   • Hypertension    • Vitamin D deficiency          Past Surgical History:   Procedure Laterality Date   • COLONOSCOPY N/A 09/10/2013    Normal, Repeat in 10 years, Dr. Ornelas   • CYSTOSCOPY     • SHOULDER SURGERY  1995    BONE SPURS       Current Outpatient Prescriptions on File Prior to Visit   Medication Sig Dispense Refill   • ACCU-CHEK FASTCLIX LANCETS misc TEST TID  5   • ACCU-CHEK SMARTVIEW test strip USE TO TEST BLOOD SUGAR BID  6   • aspirin (ASPIRIN ADULT LOW STRENGTH) 81 MG EC tablet Take 1 tablet by mouth daily.     • atorvastatin (LIPITOR) 20 MG tablet Take 1 tablet by mouth Daily. 90 tablet 1   • BD PEN NEEDLE SANDRA U/F 32G X 4 MM misc USE ONCE DAILY UTD  5   • buPROPion XL (WELLBUTRIN XL) 150 MG 24 hr tablet Take 1 tablet by mouth Every Morning. 90 tablet 2   • diclofenac  (VOLTAREN) 1 % gel gel Apply 4 g topically Daily.     • glimepiride (AMARYL) 4 MG tablet Take 4 mg by mouth 2 (Two) Times a Day Before Meals.  5   • LANTUS SOLOSTAR 100 UNIT/ML injection pen Inject 15 Units under the skin Daily.  5   • Linagliptin-Metformin HCl (JENTADUETO) 2.5-1000 MG tablet Take 1 tablet by mouth 2 (two) times a day.     • lisinopril (PRINIVIL,ZESTRIL) 10 MG tablet Take 1 tablet by mouth Daily. 90 tablet 1   • meloxicam (MOBIC) 15 MG tablet Take 15 mg by mouth Daily. INSTRUCTED PATIENT TO STOP FOR SURGERY     • tamsulosin (FLOMAX) 0.4 MG capsule 24 hr capsule Take 1 capsule by mouth Every Night.     • [DISCONTINUED] albuterol (PROVENTIL HFA;VENTOLIN HFA) 108 (90 BASE) MCG/ACT inhaler Inhale 2 puffs Every 4 (Four) Hours As Needed for wheezing. 8 g 0   • [DISCONTINUED] Cholecalciferol (VITAMIN D3) 2000 UNITS tablet Take 2,000 Units by mouth daily.     • [DISCONTINUED] Cyanocobalamin (VITAMIN B12 PO) Take 5,000 mcg by mouth daily.     • [DISCONTINUED] Omega-3-acid Ethyl Esters (LOVAZA PO) Take 4 mg by mouth daily.       No current facility-administered medications on file prior to visit.        Social History     Social History   • Marital status:      Spouse name: N/A   • Number of children: N/A   • Years of education: N/A     Occupational History   • Not on file.     Social History Main Topics   • Smoking status: Former Smoker     Packs/day: 1.00     Years: 20.00     Types: Cigarettes     Start date: 1966     Quit date: 1986   • Smokeless tobacco: Never Used   • Alcohol use No   • Drug use: No   • Sexual activity: Defer     Other Topics Concern   • Not on file     Social History Narrative           Review of Systems   Constitution: Positive for malaise/fatigue.   HENT: Negative for congestion and headaches.    Eyes: Negative for vision loss in left eye and vision loss in right eye.   Respiratory: Positive for shortness of breath. Negative for cough, hemoptysis, sleep disturbances due to  "breathing, snoring, sputum production and wheezing.    Endocrine: Negative.    Hematologic/Lymphatic: Negative.    Skin: Negative for poor wound healing and rash.   Musculoskeletal: Negative for falls, gout, muscle cramps and myalgias.   Gastrointestinal: Negative for abdominal pain, diarrhea, dysphagia, hematemesis, melena, nausea and vomiting.   Neurological: Negative for excessive daytime sleepiness, dizziness, light-headedness, loss of balance, seizures and vertigo.   Psychiatric/Behavioral: Negative for depression and substance abuse. The patient is not nervous/anxious.        Procedures    ECG 12 Lead  Date/Time: 6/23/2017 2:35 PM  Performed by: JOSE CHANG  Authorized by: JOSE CHANG   Comparison: compared with previous ECG   Similar to previous ECG  Rhythm: sinus rhythm  Conduction: LAFB  Clinical impression: abnormal ECG               Objective:      Vitals:    06/23/17 1426   BP: 126/70   BP Location: Right arm   Patient Position: Sitting   Pulse: 103   Weight: 211 lb (95.7 kg)   Height: 72\" (182.9 cm)     Body mass index is 28.62 kg/(m^2).    Physical Exam   Constitutional: He is oriented to person, place, and time. He appears well-developed and well-nourished. No distress.   HENT:   Head: Normocephalic and atraumatic.   Eyes: Conjunctivae are normal. No scleral icterus.   Neck: Neck supple. No JVD present. Carotid bruit is not present. No thyromegaly present.   Cardiovascular: Normal rate, regular rhythm, S1 normal, S2 normal, normal heart sounds and intact distal pulses.   No extrasystoles are present. PMI is not displaced.  Exam reveals no gallop.    No murmur heard.  Pulses:       Carotid pulses are 2+ on the right side, and 2+ on the left side.       Radial pulses are 2+ on the right side, and 2+ on the left side.        Dorsalis pedis pulses are 2+ on the right side, and 2+ on the left side.        Posterior tibial pulses are 2+ on the right side, and 2+ on the left side. "   Pulmonary/Chest: Effort normal and breath sounds normal. No respiratory distress. He has no wheezes. He has no rhonchi. He has no rales. He exhibits no tenderness.   Abdominal: Soft. Bowel sounds are normal. He exhibits no distension, no abdominal bruit and no mass. There is no tenderness.   Musculoskeletal: He exhibits no edema or deformity.   Lymphadenopathy:     He has no cervical adenopathy.   Neurological: He is alert and oriented to person, place, and time. No cranial nerve deficit.   Skin: Skin is warm and dry. No rash noted. He is not diaphoretic. No cyanosis. No pallor. Nails show no clubbing.   Psychiatric: He has a normal mood and affect. Judgment normal.   Vitals reviewed.      Lab Review:       Assessment:      Diagnosis Plan   1. Essential hypertension     2. Hyperlipidemia, unspecified hyperlipidemia type     3. Type 2 diabetes mellitus without complication, without long-term current use of insulin       1. Normal stress perfusion study 8/2012 and 1/2015.  2. Abnormal ECG. Left anterior fascicular block which is chronic.  3. Hypertension, under good control.  4. Diabetes mellitus type 2. Stable.  5. Obstructive sleep apnea on CPAP. Getting retested for this.  6. Hyperlipidemia.   7. Hepatitis C, stable.  8. History of rheumatoid fever, stable. No valvular heart problems.   9. History of gout.  10. Plaquing of the left carotid artery.       Plan:     He is clear for elbow surgery. See back in 1 year, no changes.

## 2017-07-11 ENCOUNTER — APPOINTMENT (OUTPATIENT)
Dept: PREADMISSION TESTING | Facility: HOSPITAL | Age: 67
End: 2017-07-11

## 2017-07-11 VITALS
HEIGHT: 72 IN | SYSTOLIC BLOOD PRESSURE: 140 MMHG | HEART RATE: 86 BPM | WEIGHT: 211.3 LBS | TEMPERATURE: 97.7 F | OXYGEN SATURATION: 96 % | DIASTOLIC BLOOD PRESSURE: 89 MMHG | BODY MASS INDEX: 28.62 KG/M2 | RESPIRATION RATE: 16 BRPM

## 2017-07-11 LAB
ALBUMIN SERPL-MCNC: 4.7 G/DL (ref 3.5–5.2)
ALBUMIN/GLOB SERPL: 1.6 G/DL
ALP SERPL-CCNC: 87 U/L (ref 39–117)
ALT SERPL W P-5'-P-CCNC: 23 U/L (ref 1–41)
ANION GAP SERPL CALCULATED.3IONS-SCNC: 15 MMOL/L
AST SERPL-CCNC: 22 U/L (ref 1–40)
BILIRUB SERPL-MCNC: 0.6 MG/DL (ref 0.1–1.2)
BILIRUB UR QL STRIP: NEGATIVE
BUN BLD-MCNC: 11 MG/DL (ref 8–23)
BUN/CREAT SERPL: 11.5 (ref 7–25)
CALCIUM SPEC-SCNC: 9.4 MG/DL (ref 8.6–10.5)
CHLORIDE SERPL-SCNC: 90 MMOL/L (ref 98–107)
CLARITY UR: CLEAR
CO2 SERPL-SCNC: 26 MMOL/L (ref 22–29)
COLOR UR: YELLOW
CREAT BLD-MCNC: 0.96 MG/DL (ref 0.76–1.27)
DEPRECATED RDW RBC AUTO: 43.1 FL (ref 37–54)
EOSINOPHIL # BLD MANUAL: 0.31 10*3/MM3 (ref 0–0.7)
EOSINOPHIL NFR BLD MANUAL: 3 % (ref 0.3–6.2)
ERYTHROCYTE [DISTWIDTH] IN BLOOD BY AUTOMATED COUNT: 13.1 % (ref 11.5–14.5)
GFR SERPL CREATININE-BSD FRML MDRD: 78 ML/MIN/1.73
GLOBULIN UR ELPH-MCNC: 3 GM/DL
GLUCOSE BLD-MCNC: 103 MG/DL (ref 65–99)
GLUCOSE UR STRIP-MCNC: NEGATIVE MG/DL
HCT VFR BLD AUTO: 47 % (ref 40.4–52.2)
HGB BLD-MCNC: 15.9 G/DL (ref 13.7–17.6)
HGB UR QL STRIP.AUTO: NEGATIVE
KETONES UR QL STRIP: NEGATIVE
LEUKOCYTE ESTERASE UR QL STRIP.AUTO: NEGATIVE
LYMPHOCYTES # BLD MANUAL: 3.04 10*3/MM3 (ref 0.9–4.8)
LYMPHOCYTES NFR BLD MANUAL: 29 % (ref 19.6–45.3)
LYMPHOCYTES NFR BLD MANUAL: 7 % (ref 5–12)
MCH RBC QN AUTO: 30.6 PG (ref 27–32.7)
MCHC RBC AUTO-ENTMCNC: 33.8 G/DL (ref 32.6–36.4)
MCV RBC AUTO: 90.6 FL (ref 79.8–96.2)
MONOCYTES # BLD AUTO: 0.73 10*3/MM3 (ref 0.2–1.2)
NEUTROPHILS # BLD AUTO: 6.4 10*3/MM3 (ref 1.9–8.1)
NEUTROPHILS NFR BLD MANUAL: 61 % (ref 42.7–76)
NITRITE UR QL STRIP: NEGATIVE
PH UR STRIP.AUTO: 7.5 [PH] (ref 5–8)
PLAT MORPH BLD: NORMAL
PLATELET # BLD AUTO: 217 10*3/MM3 (ref 140–500)
PMV BLD AUTO: 10 FL (ref 6–12)
POTASSIUM BLD-SCNC: 4.4 MMOL/L (ref 3.5–5.2)
PROT SERPL-MCNC: 7.7 G/DL (ref 6–8.5)
PROT UR QL STRIP: NEGATIVE
RBC # BLD AUTO: 5.19 10*6/MM3 (ref 4.6–6)
RBC MORPH BLD: NORMAL
SODIUM BLD-SCNC: 131 MMOL/L (ref 136–145)
SP GR UR STRIP: 1.01 (ref 1–1.03)
UROBILINOGEN UR QL STRIP: NORMAL
WBC MORPH BLD: NORMAL
WBC NRBC COR # BLD: 10.49 10*3/MM3 (ref 4.5–10.7)

## 2017-07-11 PROCEDURE — 85027 COMPLETE CBC AUTOMATED: CPT | Performed by: ORTHOPAEDIC SURGERY

## 2017-07-11 PROCEDURE — 80053 COMPREHEN METABOLIC PANEL: CPT | Performed by: ORTHOPAEDIC SURGERY

## 2017-07-11 PROCEDURE — 81003 URINALYSIS AUTO W/O SCOPE: CPT | Performed by: ORTHOPAEDIC SURGERY

## 2017-07-11 PROCEDURE — 36415 COLL VENOUS BLD VENIPUNCTURE: CPT

## 2017-07-11 PROCEDURE — 85007 BL SMEAR W/DIFF WBC COUNT: CPT | Performed by: ORTHOPAEDIC SURGERY

## 2017-07-11 RX ORDER — OMEGA-3-ACID ETHYL ESTERS 1 G/1
2 CAPSULE, LIQUID FILLED ORAL DAILY
COMMUNITY
End: 2018-01-23

## 2017-07-11 NOTE — DISCHARGE INSTRUCTIONS
Take the following medications the morning of surgery with a small sip of water:  NONE    The hospital will call you the day before with your arrival time.    General Instructions:  • Do not eat solid food after midnight the night before surgery.  • You may drink clear liquids day of surgery but must stop at least one hour before your hospital arrival time.  • It is beneficial for you to have a clear drink that contains carbohydrates the day of surgery.  We suggest a 20 ounce bottle of Gatorade or Powerade for non-diabetic patients or a 20 ounce bottle of G2 or Powerade Zero for diabetic patients. (Pediatric patients, are not advised to drink a 20 ounce carbohydrate drink)    Clear liquids are liquids you can see through.  Nothing red in color.     Plain water                               Sports drinks  Sodas                                   Gelatin (Jell-O)  Fruit juices without pulp such as white grape juice and apple juice  Popsicles that contain no fruit or yogurt  Tea or coffee (no cream or milk added)  Gatorade / Powerade  G2 / Powerade Zero    • Infants may have breast milk up to four hours before surgery.  • Infants drinking formula may drink formula up to six hours before surgery.   • Patients who avoid smoking, chewing tobacco and alcohol for 4 weeks prior to surgery have a reduced risk of post-operative complications.  Quit smoking as many days before surgery as you can.  • Do not smoke, use chewing tobacco or drink alcohol the day of surgery.   • If applicable bring your C-PAP/ BI-PAP machine.  • Bring any papers given to you in the doctor’s office.  • Wear clean comfortable clothes and socks.  • Do not wear contact lenses or make-up.  Bring a case for your glasses.   • Bring crutches or walker if applicable.  • Leave all other valuables and jewelry at home.  • The Pre-Admission Testing nurse will instruct you to bring medications if unable to obtain an accurate list in Pre-Admission Testing.        If  you were given a blood bank ID arm band remember to bring it with you the day of surgery.    Preventing a Surgical Site Infection:  • For 2 to 3 days before surgery, avoid shaving with a razor because the razor can irritate skin and make it easier to develop an infection.  • The night prior to surgery sleep in a clean bed with clean clothing.  Do not allow pets to sleep with you.  • Shower on the morning of surgery using a fresh bar of anti-bacterial soap (such as Dial) and clean washcloth.  Dry with a clean towel and dress in clean clothing.  • Ask your surgeon if you will be receiving antibiotics prior to surgery.  • Make sure you, your family, and all healthcare providers clean their hands with soap and water or an alcohol based hand  before caring for you or your wound.    Day of surgery:  Upon arrival, a Pre-op nurse and Anesthesiologist will review your health history, obtain vital signs, and answer questions you may have.  The only belongings needed at this time will be your home medications and if applicable your C-PAP/BI-PAP machine.  If you are staying overnight your family can leave the rest of your belongings in the car and bring them to your room later.  A Pre-op nurse will start an IV and you may receive medication in preparation for surgery, including something to help you relax.  Your family will be able to see you in the Pre-op area.  While you are in surgery your family should notify the waiting room  if they leave the waiting room area and provide a contact phone number.    Please be aware that surgery does come with discomfort.  We want to make every effort to control your discomfort so please discuss any uncontrolled symptoms with your nurse.   Your doctor will most likely have prescribed pain medications.      If you are going home after surgery you will receive individualized written care instructions before being discharged.  A responsible adult must drive you to and from  the hospital on the day of your surgery and stay with you for 24 hours.    If you are staying overnight following surgery, you will be transported to your hospital room following the recovery period.  [facility] has all private rooms.    If you have any questions please call Pre-Admission Testing at [phone].  Deductibles and co-payments are collected on the day of service. Please be prepared to pay the required co-pay, deductible or deposit on the day of service as defined by your plan.

## 2017-07-18 ENCOUNTER — ANESTHESIA (OUTPATIENT)
Dept: PERIOP | Facility: HOSPITAL | Age: 67
End: 2017-07-18

## 2017-07-18 ENCOUNTER — HOSPITAL ENCOUNTER (OUTPATIENT)
Facility: HOSPITAL | Age: 67
Setting detail: HOSPITAL OUTPATIENT SURGERY
Discharge: HOME | End: 2017-07-18
Attending: ORTHOPAEDIC SURGERY | Admitting: ORTHOPAEDIC SURGERY

## 2017-07-18 ENCOUNTER — ANESTHESIA EVENT (OUTPATIENT)
Dept: PERIOP | Facility: HOSPITAL | Age: 67
End: 2017-07-18

## 2017-07-18 VITALS
BODY MASS INDEX: 28.35 KG/M2 | RESPIRATION RATE: 16 BRPM | WEIGHT: 209 LBS | SYSTOLIC BLOOD PRESSURE: 152 MMHG | HEART RATE: 66 BPM | OXYGEN SATURATION: 95 % | DIASTOLIC BLOOD PRESSURE: 91 MMHG | TEMPERATURE: 97.2 F

## 2017-07-18 LAB
GLUCOSE BLDC GLUCOMTR-MCNC: 104 MG/DL (ref 70–130)
GLUCOSE BLDC GLUCOMTR-MCNC: 137 MG/DL (ref 70–130)

## 2017-07-18 PROCEDURE — 25010000002 MIDAZOLAM PER 1 MG: Performed by: ANESTHESIOLOGY

## 2017-07-18 PROCEDURE — 25010000002 DEXAMETHASONE PER 1 MG: Performed by: ANESTHESIOLOGY

## 2017-07-18 PROCEDURE — 82962 GLUCOSE BLOOD TEST: CPT

## 2017-07-18 PROCEDURE — 25010000002 FENTANYL CITRATE (PF) 100 MCG/2ML SOLUTION: Performed by: ANESTHESIOLOGY

## 2017-07-18 PROCEDURE — 25010000003 CEFAZOLIN IN DEXTROSE 2-4 GM/100ML-% SOLUTION: Performed by: ORTHOPAEDIC SURGERY

## 2017-07-18 PROCEDURE — 25010000002 PROPOFOL 10 MG/ML EMULSION: Performed by: NURSE ANESTHETIST, CERTIFIED REGISTERED

## 2017-07-18 PROCEDURE — 25010000002 ROPIVACAINE PER 1 MG: Performed by: ANESTHESIOLOGY

## 2017-07-18 PROCEDURE — C1713 ANCHOR/SCREW BN/BN,TIS/BN: HCPCS | Performed by: ORTHOPAEDIC SURGERY

## 2017-07-18 PROCEDURE — 25010000002 ONDANSETRON PER 1 MG: Performed by: NURSE ANESTHETIST, CERTIFIED REGISTERED

## 2017-07-18 DEVICE — SUT/ANCH JUGGERKNOT SFT RIGID SHT SZ1 1.4MM W/BIT: Type: IMPLANTABLE DEVICE | Site: ELBOW | Status: FUNCTIONAL

## 2017-07-18 RX ORDER — ROPIVACAINE HYDROCHLORIDE 5 MG/ML
INJECTION, SOLUTION EPIDURAL; INFILTRATION; PERINEURAL AS NEEDED
Status: DISCONTINUED | OUTPATIENT
Start: 2017-07-18 | End: 2017-07-18 | Stop reason: SURG

## 2017-07-18 RX ORDER — OXYCODONE HYDROCHLORIDE AND ACETAMINOPHEN 5; 325 MG/1; MG/1
1 TABLET ORAL ONCE AS NEEDED
Status: DISCONTINUED | OUTPATIENT
Start: 2017-07-18 | End: 2017-07-18

## 2017-07-18 RX ORDER — SODIUM CHLORIDE, SODIUM LACTATE, POTASSIUM CHLORIDE, CALCIUM CHLORIDE 600; 310; 30; 20 MG/100ML; MG/100ML; MG/100ML; MG/100ML
9 INJECTION, SOLUTION INTRAVENOUS CONTINUOUS
Status: DISCONTINUED | OUTPATIENT
Start: 2017-07-18 | End: 2017-07-18 | Stop reason: HOSPADM

## 2017-07-18 RX ORDER — WOUND DRESSING ADHESIVE - LIQUID
LIQUID MISCELLANEOUS AS NEEDED
Status: DISCONTINUED | OUTPATIENT
Start: 2017-07-18 | End: 2017-07-18 | Stop reason: HOSPADM

## 2017-07-18 RX ORDER — LIDOCAINE HYDROCHLORIDE 10 MG/ML
0.5 INJECTION, SOLUTION EPIDURAL; INFILTRATION; INTRACAUDAL; PERINEURAL ONCE AS NEEDED
Status: COMPLETED | OUTPATIENT
Start: 2017-07-18 | End: 2017-07-18

## 2017-07-18 RX ORDER — FENTANYL CITRATE 50 UG/ML
50 INJECTION, SOLUTION INTRAMUSCULAR; INTRAVENOUS
Status: DISCONTINUED | OUTPATIENT
Start: 2017-07-18 | End: 2017-07-18 | Stop reason: HOSPADM

## 2017-07-18 RX ORDER — PROMETHAZINE HYDROCHLORIDE 25 MG/ML
6.25 INJECTION, SOLUTION INTRAMUSCULAR; INTRAVENOUS ONCE AS NEEDED
Status: DISCONTINUED | OUTPATIENT
Start: 2017-07-18 | End: 2017-07-18 | Stop reason: HOSPADM

## 2017-07-18 RX ORDER — SODIUM CHLORIDE 0.9 % (FLUSH) 0.9 %
1-10 SYRINGE (ML) INJECTION AS NEEDED
Status: DISCONTINUED | OUTPATIENT
Start: 2017-07-18 | End: 2017-07-18 | Stop reason: HOSPADM

## 2017-07-18 RX ORDER — ACETAMINOPHEN 325 MG/1
650 TABLET ORAL ONCE
Status: COMPLETED | OUTPATIENT
Start: 2017-07-18 | End: 2017-07-18

## 2017-07-18 RX ORDER — DIPHENHYDRAMINE HYDROCHLORIDE 50 MG/ML
12.5 INJECTION INTRAMUSCULAR; INTRAVENOUS
Status: DISCONTINUED | OUTPATIENT
Start: 2017-07-18 | End: 2017-07-18 | Stop reason: HOSPADM

## 2017-07-18 RX ORDER — ACETAMINOPHEN 650 MG/1
650 SUPPOSITORY RECTAL ONCE AS NEEDED
Status: DISCONTINUED | OUTPATIENT
Start: 2017-07-18 | End: 2017-07-18 | Stop reason: HOSPADM

## 2017-07-18 RX ORDER — PROPOFOL 10 MG/ML
VIAL (ML) INTRAVENOUS AS NEEDED
Status: DISCONTINUED | OUTPATIENT
Start: 2017-07-18 | End: 2017-07-18 | Stop reason: SURG

## 2017-07-18 RX ORDER — OXYCODONE HYDROCHLORIDE AND ACETAMINOPHEN 5; 325 MG/1; MG/1
2 TABLET ORAL ONCE AS NEEDED
Status: DISCONTINUED | OUTPATIENT
Start: 2017-07-18 | End: 2017-07-18 | Stop reason: HOSPADM

## 2017-07-18 RX ORDER — ONDANSETRON 2 MG/ML
INJECTION INTRAMUSCULAR; INTRAVENOUS AS NEEDED
Status: DISCONTINUED | OUTPATIENT
Start: 2017-07-18 | End: 2017-07-18 | Stop reason: SURG

## 2017-07-18 RX ORDER — PROMETHAZINE HYDROCHLORIDE 25 MG/1
25 SUPPOSITORY RECTAL ONCE AS NEEDED
Status: DISCONTINUED | OUTPATIENT
Start: 2017-07-18 | End: 2017-07-18 | Stop reason: HOSPADM

## 2017-07-18 RX ORDER — MIDAZOLAM HYDROCHLORIDE 1 MG/ML
2 INJECTION INTRAMUSCULAR; INTRAVENOUS
Status: DISCONTINUED | OUTPATIENT
Start: 2017-07-18 | End: 2017-07-18 | Stop reason: HOSPADM

## 2017-07-18 RX ORDER — ACETAMINOPHEN 325 MG/1
650 TABLET ORAL ONCE AS NEEDED
Status: DISCONTINUED | OUTPATIENT
Start: 2017-07-18 | End: 2017-07-18 | Stop reason: HOSPADM

## 2017-07-18 RX ORDER — MIDAZOLAM HYDROCHLORIDE 1 MG/ML
1 INJECTION INTRAMUSCULAR; INTRAVENOUS
Status: DISCONTINUED | OUTPATIENT
Start: 2017-07-18 | End: 2017-07-18 | Stop reason: HOSPADM

## 2017-07-18 RX ORDER — FAMOTIDINE 10 MG/ML
20 INJECTION, SOLUTION INTRAVENOUS ONCE
Status: COMPLETED | OUTPATIENT
Start: 2017-07-18 | End: 2017-07-18

## 2017-07-18 RX ORDER — LIDOCAINE HYDROCHLORIDE 20 MG/ML
INJECTION, SOLUTION INFILTRATION; PERINEURAL AS NEEDED
Status: DISCONTINUED | OUTPATIENT
Start: 2017-07-18 | End: 2017-07-18 | Stop reason: SURG

## 2017-07-18 RX ORDER — NALOXONE HCL 0.4 MG/ML
0.4 VIAL (ML) INJECTION AS NEEDED
Status: DISCONTINUED | OUTPATIENT
Start: 2017-07-18 | End: 2017-07-18 | Stop reason: HOSPADM

## 2017-07-18 RX ORDER — CEFAZOLIN SODIUM 2 G/100ML
2 INJECTION, SOLUTION INTRAVENOUS ONCE
Status: COMPLETED | OUTPATIENT
Start: 2017-07-18 | End: 2017-07-18

## 2017-07-18 RX ORDER — SODIUM CHLORIDE, SODIUM LACTATE, POTASSIUM CHLORIDE, CALCIUM CHLORIDE 600; 310; 30; 20 MG/100ML; MG/100ML; MG/100ML; MG/100ML
100 INJECTION, SOLUTION INTRAVENOUS CONTINUOUS
Status: CANCELLED | OUTPATIENT
Start: 2017-07-18

## 2017-07-18 RX ORDER — HYDROMORPHONE HYDROCHLORIDE 1 MG/ML
0.5 INJECTION, SOLUTION INTRAMUSCULAR; INTRAVENOUS; SUBCUTANEOUS
Status: DISCONTINUED | OUTPATIENT
Start: 2017-07-18 | End: 2017-07-18 | Stop reason: HOSPADM

## 2017-07-18 RX ORDER — NALBUPHINE HCL 10 MG/ML
2 AMPUL (ML) INJECTION EVERY 4 HOURS PRN
Status: DISCONTINUED | OUTPATIENT
Start: 2017-07-18 | End: 2017-07-18 | Stop reason: HOSPADM

## 2017-07-18 RX ORDER — PROMETHAZINE HYDROCHLORIDE 25 MG/1
25 TABLET ORAL ONCE AS NEEDED
Status: DISCONTINUED | OUTPATIENT
Start: 2017-07-18 | End: 2017-07-18 | Stop reason: HOSPADM

## 2017-07-18 RX ORDER — HYDRALAZINE HYDROCHLORIDE 20 MG/ML
5 INJECTION INTRAMUSCULAR; INTRAVENOUS
Status: DISCONTINUED | OUTPATIENT
Start: 2017-07-18 | End: 2017-07-18 | Stop reason: HOSPADM

## 2017-07-18 RX ORDER — DEXAMETHASONE SODIUM PHOSPHATE 4 MG/ML
INJECTION, SOLUTION INTRA-ARTICULAR; INTRALESIONAL; INTRAMUSCULAR; INTRAVENOUS; SOFT TISSUE AS NEEDED
Status: DISCONTINUED | OUTPATIENT
Start: 2017-07-18 | End: 2017-07-18 | Stop reason: SURG

## 2017-07-18 RX ORDER — NALBUPHINE HCL 10 MG/ML
10 AMPUL (ML) INJECTION EVERY 4 HOURS PRN
Status: DISCONTINUED | OUTPATIENT
Start: 2017-07-18 | End: 2017-07-18 | Stop reason: HOSPADM

## 2017-07-18 RX ADMIN — SODIUM CHLORIDE, POTASSIUM CHLORIDE, SODIUM LACTATE AND CALCIUM CHLORIDE 9 ML/HR: 600; 310; 30; 20 INJECTION, SOLUTION INTRAVENOUS at 09:11

## 2017-07-18 RX ADMIN — FENTANYL CITRATE 50 MCG: 50 INJECTION INTRAMUSCULAR; INTRAVENOUS at 11:57

## 2017-07-18 RX ADMIN — OXYCODONE HYDROCHLORIDE AND ACETAMINOPHEN 2 TABLET: 5; 325 TABLET ORAL at 11:52

## 2017-07-18 RX ADMIN — FENTANYL CITRATE 50 MCG: 50 INJECTION INTRAMUSCULAR; INTRAVENOUS at 09:44

## 2017-07-18 RX ADMIN — ACETAMINOPHEN 650 MG: 325 TABLET ORAL at 09:44

## 2017-07-18 RX ADMIN — LIDOCAINE HYDROCHLORIDE 25 MG: 20 INJECTION, SOLUTION INFILTRATION; PERINEURAL at 10:35

## 2017-07-18 RX ADMIN — SODIUM CHLORIDE, POTASSIUM CHLORIDE, SODIUM LACTATE AND CALCIUM CHLORIDE: 600; 310; 30; 20 INJECTION, SOLUTION INTRAVENOUS at 11:05

## 2017-07-18 RX ADMIN — FENTANYL CITRATE 50 MCG: 50 INJECTION INTRAMUSCULAR; INTRAVENOUS at 11:52

## 2017-07-18 RX ADMIN — CEFAZOLIN SODIUM 2 G: 2 INJECTION, SOLUTION INTRAVENOUS at 10:35

## 2017-07-18 RX ADMIN — MIDAZOLAM 2 MG: 1 INJECTION INTRAMUSCULAR; INTRAVENOUS at 09:44

## 2017-07-18 RX ADMIN — FAMOTIDINE 20 MG: 10 INJECTION INTRAVENOUS at 09:44

## 2017-07-18 RX ADMIN — ONDANSETRON 4 MG: 2 INJECTION INTRAMUSCULAR; INTRAVENOUS at 11:15

## 2017-07-18 RX ADMIN — ROPIVACAINE HYDROCHLORIDE 20 ML: 5 INJECTION, SOLUTION EPIDURAL; INFILTRATION; PERINEURAL at 09:50

## 2017-07-18 RX ADMIN — LIDOCAINE HYDROCHLORIDE 0.5 ML: 10 INJECTION, SOLUTION EPIDURAL; INFILTRATION; INTRACAUDAL; PERINEURAL at 09:11

## 2017-07-18 RX ADMIN — PROPOFOL 130 MG: 10 INJECTION, EMULSION INTRAVENOUS at 10:35

## 2017-07-18 RX ADMIN — DEXAMETHASONE SODIUM PHOSPHATE 4 MG: 4 INJECTION, SOLUTION INTRAMUSCULAR; INTRAVENOUS at 09:50

## 2017-07-18 NOTE — PLAN OF CARE
Problem: Patient Care Overview (Adult)  Goal: Plan of Care Review  Outcome: Ongoing (interventions implemented as appropriate)    07/18/17 1210   Coping/Psychosocial Response Interventions   Plan Of Care Reviewed With patient   Patient Care Overview   Progress progress toward functional goals as expected

## 2017-07-18 NOTE — PLAN OF CARE
Problem: Perioperative Period (Adult)  Goal: Signs and Symptoms of Listed Potential Problems Will be Absent or Manageable (Perioperative Period)  Outcome: Ongoing (interventions implemented as appropriate)    07/18/17 1210   Perioperative Period   Problems Assessed (Perioperative Period) all   Problems Present (Perioperative Period) pain

## 2017-07-18 NOTE — DISCHARGE INSTRUCTIONS
YOU HAD TWO PERCOCET AT 11:52 AM.          What to expect after a Nerve Block    Nerve blocks administered to block pain affect many types of nerves, including those nerves that control movement, pain, and normal sensation. Following a nerve block, you may notice some bruising at the site where the block was given. You may experience sensations such as: numbness of the affected area or limb, tingling, heaviness (that is the limb feels heavy to you), weakness or inability to move the affected arm or leg, or a feeling as if your arm or leg has “fallen asleep.”     A nerve block can last from 2 to 36 hours depending on the medications used.  Usually the weakness wears off first followed by the tingling and heaviness. As the block wears off, you may begin to notice pain; however, this sequence of events may occur in any order. Typically, you will be able to move your limb before you will feel it. Once a nerve block begins to wear off, the effects are usually completely gone within 60 minutes.  If you experience continued side effects that you believe are block related for longer than 48 hours, please call your healthcare provider. Please see block-specific instructions below.    Instructions for any block involving the shoulder or arm  • If you have had any kind of shoulder/arm block, you will go home with your arm in a sling. Wear the sling until the block has completely worn off. You may be required to wear it for a longer period of time per your surgeon’s recommendations.  • If you have had a shoulder/arm block, it is a good idea to sleep on a recliner with pillows under your arm.    You may experience symptoms such as:  Shortness of breath  Hoarseness   Blurry vision  Unequal pupils  Drooping of your face on the same side as the block was performed    These are side effects associated with this kind of block and should go away within 12 hours.    Note: If you have severe or prolonged shortness of breath, please seek  medical assistance as soon as possible.     Protection of a “blocked” arm or leg (limb)  • After a nerve block, you cannot feel pain, pressure, or extremes of temperature in the affected limb. And because of this, your blocked limb is at more risk for injury. For example, it is possible to burn your limb on an extremely hot surface without feeling it.     • When resting, it is important to reposition your limb periodically to avoid prolonged pressure on it. This may require the use of pillows and padding.    • While sleeping, you should avoid rolling onto the affected limb or putting too much pressure on it.     • If you have a cast or tight dressing, check the color of your fingers or toes of the affected limb. Call your surgeon if they look discolored (that is, dusky, dark colored).    • Use caution in cold weather. Cover your limb appropriately to protect it from the cold.      Pain Management:    Your surgeon will give you a prescription for pain medication. Begin taking this before the nerve block wears off. Bear in mind that sometimes the block can wear off in the middle of the night.

## 2017-07-18 NOTE — ANESTHESIA PROCEDURE NOTES
Peripheral Block    Patient location during procedure: pre-op  Start time: 7/18/2017 9:44 AM  Stop time: 7/18/2017 9:52 AM  Reason for block: at surgeon's request and post-op pain management  Performed by  Anesthesiologist: GABY KERR  Preanesthetic Checklist  Completed: patient identified, site marked, surgical consent, pre-op evaluation, timeout performed, IV checked, risks and benefits discussed and monitors and equipment checked  Prep:  Sterile barriers:gloves  Prep: ChloraPrep  Patient monitoring: blood pressure monitoring, continuous pulse oximetry and EKG  Procedure  Sedation:yes  Guidance:ultrasound guided  ULTRASOUND INTERPRETATION.  Using ultrasound guidance a 22 G gauge needle was placed in close proximity to the brachial plexus nerve, at which point, under ultrasound guidance anesthetic was injected in the area of the nerve and spread of the anesthesia was seen on ultrasound in close proximity thereto.  There were no abnormalities seen on ultrasound; a digital image was taken; and the patient tolerated the procedure with no complications. Images:still images obtained    Laterality:right  Block Type:supraclavicular  Injection Technique:single-shot  Needle Type:short-bevel  Needle Gauge:22 G    Medications  Analgesia: Dexamethasone 4mg.  Comment:Dexamethasone 4mg added to injectate  Local Injected:ropivacaine 0.5% Local Amount Injected:20mL  Post Assessment  Injection Assessment: negative aspiration for heme, no paresthesia on injection and incremental injection  Patient Tolerance:comfortable throughout block  Complications:no

## 2017-07-18 NOTE — PLAN OF CARE
Problem: Patient Care Overview (Adult)  Goal: Plan of Care Review  Outcome: Outcome(s) achieved Date Met:  07/18/17  Goal: Adult Individualization and Mutuality  Outcome: Outcome(s) achieved Date Met:  07/18/17  Goal: Discharge Needs Assessment  Outcome: Outcome(s) achieved Date Met:  07/18/17    Problem: Perioperative Period (Adult)  Goal: Signs and Symptoms of Listed Potential Problems Will be Absent or Manageable (Perioperative Period)  Outcome: Outcome(s) achieved Date Met:  07/18/17

## 2017-07-18 NOTE — ANESTHESIA POSTPROCEDURE EVALUATION
Patient: Mike Cox    Procedure Summary     Date Anesthesia Start Anesthesia Stop Room / Location    07/18/17 1028 1128  [facility]  /  [facility]       Procedure Diagnosis Surgeon Provider    RIGHT ELBOW OPEN FLEXOR TENDON DEBRIDEMENT AND REPAIR (Right Elbow) No diagnosis on file. MD Kevan Aquino MD          Anesthesia Type: general, regional  Last vitals  BP   (!) 161/102 (07/18/17 1216)    Temp   36.2 °C (97.2 °F) (07/18/17 1200)    Pulse   70 (07/18/17 1216)   Resp   16 (07/18/17 1216)    SpO2   96 % (07/18/17 1216)      Post Anesthesia Care and Evaluation    Patient location during evaluation: bedside  Patient participation: complete - patient participated  Level of consciousness: awake and alert  Pain management: adequate  Airway patency: patent  Anesthetic complications: No anesthetic complications    Cardiovascular status: acceptable  Respiratory status: acceptable  Hydration status: acceptable

## 2017-07-18 NOTE — PLAN OF CARE
Problem: Patient Care Overview (Adult)  Goal: Discharge Needs Assessment  Outcome: Ongoing (interventions implemented as appropriate)    07/18/17 1209   Discharge Needs Assessment   Concerns To Be Addressed no discharge needs identified   Readmission Within The Last 30 Days no previous admission in last 30 days   Living Environment   Transportation Available car

## 2017-07-18 NOTE — ANESTHESIA PREPROCEDURE EVALUATION
Anesthesia Evaluation     NPO Solid Status: > 6 hours  NPO Liquid Status: > 6 hours     Airway   Mallampati: III  TM distance: >3 FB  Neck ROM: full  possible difficult intubation  Comment: Slight decrease jaw opening  Dental - normal exam     Pulmonary - normal exam   (+) COPD, recent URI,   Cardiovascular   Exercise tolerance: good (4-7 METS)    ECG reviewed  Rhythm: regular    (+) hypertension, valvular problems/murmurs, PVD, hyperlipidemia  (-) murmur    ROS comment:  ECG-LAFB    Neuro/Psych  GI/Hepatic/Renal/Endo    (+)  hepatitis C, liver disease, diabetes mellitus,     Musculoskeletal     (+) back pain,   Abdominal    Substance History      OB/GYN          Other                                        Anesthesia Plan    ASA 3     general and regional   (  D/W R&B of GA including but not limited to: heart, lung, liver, kidney, neurologic problems, positioning injuries, dental damage, corneal abrasion and TMJ.  .)  intravenous induction   Anesthetic plan and risks discussed with patient.

## 2017-07-18 NOTE — PLAN OF CARE
Problem: Patient Care Overview (Adult)  Goal: Adult Individualization and Mutuality  Outcome: Ongoing (interventions implemented as appropriate)    07/18/17 1209   Individualization   Patient Specific Goals pain control

## 2017-07-18 NOTE — ANESTHESIA PROCEDURE NOTES
Airway  Urgency: elective    Airway not difficult    General Information and Staff    Patient location during procedure: OR  Anesthesiologist: RENDER, GABY RAY  CRNA: CHINMAY AVILES    Indications and Patient Condition  Indications for airway management: airway protection    Preoxygenated: yes  MILS maintained throughout  Mask difficulty assessment: 0 - not attempted    Final Airway Details  Final airway type: supraglottic airway      Successful airway: classic  Size 5    Number of attempts at approach: 1    Additional Comments  Placed with ease. Vent with ease. Teeth as pre-op. Secured to face.

## 2017-08-16 ENCOUNTER — TELEPHONE (OUTPATIENT)
Dept: FAMILY MEDICINE CLINIC | Facility: CLINIC | Age: 67
End: 2017-08-16

## 2017-08-16 DIAGNOSIS — K64.9 HEMORRHOIDS, UNSPECIFIED HEMORRHOID TYPE: Primary | ICD-10-CM

## 2017-08-16 NOTE — TELEPHONE ENCOUNTER
Pt thinks that he has some hemorrhoids and would like to be referred. Does not want to go to the female he was referred to last time because he had to wait 2 hours and ended up leaving before being seen.

## 2017-08-18 ENCOUNTER — TELEPHONE (OUTPATIENT)
Dept: FAMILY MEDICINE CLINIC | Facility: CLINIC | Age: 67
End: 2017-08-18

## 2017-08-18 NOTE — TELEPHONE ENCOUNTER
Pt would like a refill on LMX 4% cream 5gm   Apply three times daily to affected area as needed for pain or itching #25    shauna

## 2017-08-21 RX ORDER — LIDOCAINE 40 MG/G
CREAM TOPICAL AS NEEDED
Qty: 30 G | Refills: 0 | Status: SHIPPED | OUTPATIENT
Start: 2017-08-21 | End: 2017-10-23

## 2017-08-29 ENCOUNTER — OFFICE VISIT (OUTPATIENT)
Dept: GASTROENTEROLOGY | Facility: CLINIC | Age: 67
End: 2017-08-29

## 2017-08-29 VITALS
WEIGHT: 219 LBS | TEMPERATURE: 97.9 F | HEIGHT: 72 IN | BODY MASS INDEX: 29.66 KG/M2 | DIASTOLIC BLOOD PRESSURE: 92 MMHG | SYSTOLIC BLOOD PRESSURE: 138 MMHG

## 2017-08-29 DIAGNOSIS — K64.8 INTERNAL HEMORRHOIDS: Primary | ICD-10-CM

## 2017-08-29 PROCEDURE — 99213 OFFICE O/P EST LOW 20 MIN: CPT | Performed by: INTERNAL MEDICINE

## 2017-08-29 NOTE — PROGRESS NOTES
Chief Complaint   Patient presents with   • Hemorrhoids       Mike Cox is a  66 y.o. male here for a follow up visit for Internal hemorrhoids.    HPI    Patient 66-year-old male with history of hypertension hepatitis C status post cure, diabetes presenting with complaints of prolapsing internal hemorrhoids.  Patient denies pain occasional blood on the tissue but no significant GI bleed noted.  Patient denies abdominal pain or change in bowel habits.  Patient has been using Metamucil and improved stools consistency.  Patient also given lidocaine cream for pain as needed but does not use that.  Patient here for further recommendations.    Past Medical History:   Diagnosis Date   • Arthritis    • BPH (benign prostatic hyperplasia)    • Carotid artery disease     RIGHT   • Diabetes mellitus     pt reports A1C of 7.1 in 3/2016   • Elbow pain, right    • Heart murmur    • High cholesterol    • History of hepatitis C     In remission after Interferon and Ribavirin treatment   • Hypertension    • Vitamin D deficiency          Current Outpatient Prescriptions:   •  ACCU-CHEK FASTCLIX LANCETS misc, TEST TID, Disp: , Rfl: 5  •  ACCU-CHEK SMARTVIEW test strip, USE TO TEST BLOOD SUGAR BID, Disp: , Rfl: 6  •  aspirin (ASPIRIN ADULT LOW STRENGTH) 81 MG EC tablet, Take 1 tablet by mouth Daily. PT TO HOLD MED PRIOR TO OR, Disp: , Rfl:   •  atorvastatin (LIPITOR) 20 MG tablet, Take 1 tablet by mouth Daily., Disp: 90 tablet, Rfl: 1  •  BD PEN NEEDLE SANDRA U/F 32G X 4 MM misc, USE ONCE DAILY UTD, Disp: , Rfl: 5  •  buPROPion XL (WELLBUTRIN XL) 150 MG 24 hr tablet, Take 1 tablet by mouth Every Morning., Disp: 90 tablet, Rfl: 2  •  glimepiride (AMARYL) 4 MG tablet, Take 4 mg by mouth 2 (Two) Times a Day Before Meals., Disp: , Rfl: 5  •  LANTUS SOLOSTAR 100 UNIT/ML injection pen, Inject 10 Units under the skin Every Morning., Disp: , Rfl: 5  •  lidocaine (L-M-X 4) 4 % cream, Apply  topically As Needed for Mild Pain ., Disp: 30 g,  Rfl: 0  •  Linagliptin-Metformin HCl (JENTADUETO) 2.5-1000 MG tablet, Take 1 tablet by mouth 2 (two) times a day., Disp: , Rfl:   •  lisinopril (PRINIVIL,ZESTRIL) 10 MG tablet, Take 1 tablet by mouth Daily., Disp: 90 tablet, Rfl: 1  •  meloxicam (MOBIC) 15 MG tablet, Take 15 mg by mouth Daily. INSTRUCTED PATIENT TO STOP FOR SURGERY, Disp: , Rfl:   •  omega-3 acid ethyl esters (LOVAZA) 1 G capsule, Take 2 g by mouth Daily. PT TO HOLD MED PRIOR TO OR, Disp: , Rfl:   •  tamsulosin (FLOMAX) 0.4 MG capsule 24 hr capsule, Take 1 capsule by mouth Every Night., Disp: , Rfl:   •  hydrocortisone (ANUSOL-HC) 2.5 % rectal cream, Insert  into the rectum 2 (Two) Times a Day. Use externally twice daily, Disp: 30 g, Rfl: 2    No Known Allergies    Social History     Social History   • Marital status:      Spouse name: N/A   • Number of children: N/A   • Years of education: N/A     Occupational History   • Not on file.     Social History Main Topics   • Smoking status: Former Smoker     Packs/day: 1.00     Years: 20.00     Types: Cigarettes     Start date: 1966     Quit date: 1986   • Smokeless tobacco: Never Used   • Alcohol use No   • Drug use: No   • Sexual activity: Defer     Other Topics Concern   • Not on file     Social History Narrative       Family History   Problem Relation Age of Onset   • Heart disease Father    • Colon cancer Paternal Uncle      diagnosed in 60s   • Diabetes Sister    • Malig Hyperthermia Neg Hx        Review of Systems   Constitutional: Negative.    Respiratory: Negative.    Cardiovascular: Negative.    Gastrointestinal: Positive for anal bleeding. Negative for abdominal distention, abdominal pain, constipation, diarrhea and rectal pain.   Skin: Negative.    Hematological: Negative.        Vitals:    08/29/17 1132   BP: 138/92   Temp: 97.9 °F (36.6 °C)       Physical Exam   Constitutional: He is oriented to person, place, and time. He appears well-developed and well-nourished.   HENT:   Head:  Normocephalic and atraumatic.   Eyes: Conjunctivae are normal. Pupils are equal, round, and reactive to light. No scleral icterus.   Cardiovascular: Exam reveals no gallop and no friction rub.    No murmur heard.  Pulmonary/Chest: He has no wheezes. He has no rales.   Abdominal: Soft. Bowel sounds are normal. He exhibits no distension and no mass. There is no tenderness. No hernia.   Musculoskeletal: He exhibits no edema.   Neurological: He is alert and oriented to person, place, and time.   Skin: Skin is warm and dry. No rash noted.   Psychiatric: He has a normal mood and affect. His behavior is normal. Thought content normal.   Vitals reviewed.      Admission on 07/18/2017, Discharged on 07/18/2017   Component Date Value Ref Range Status   • Glucose 07/18/2017 137* 70 - 130 mg/dL Final   • Glucose 07/18/2017 104  70 - 130 mg/dL Final   Appointment on 07/11/2017   Component Date Value Ref Range Status   • Glucose 07/11/2017 103* 65 - 99 mg/dL Final   • BUN 07/11/2017 11  8 - 23 mg/dL Final   • Creatinine 07/11/2017 0.96  0.76 - 1.27 mg/dL Final   • Sodium 07/11/2017 131* 136 - 145 mmol/L Final   • Potassium 07/11/2017 4.4  3.5 - 5.2 mmol/L Final   • Chloride 07/11/2017 90* 98 - 107 mmol/L Final   • CO2 07/11/2017 26.0  22.0 - 29.0 mmol/L Final   • Calcium 07/11/2017 9.4  8.6 - 10.5 mg/dL Final   • Total Protein 07/11/2017 7.7  6.0 - 8.5 g/dL Final   • Albumin 07/11/2017 4.70  3.50 - 5.20 g/dL Final   • ALT (SGPT) 07/11/2017 23  1 - 41 U/L Final   • AST (SGOT) 07/11/2017 22  1 - 40 U/L Final   • Alkaline Phosphatase 07/11/2017 87  39 - 117 U/L Final   • Total Bilirubin 07/11/2017 0.6  0.1 - 1.2 mg/dL Final   • eGFR Non African Amer 07/11/2017 78  >60 mL/min/1.73 Final   • Globulin 07/11/2017 3.0  gm/dL Final   • A/G Ratio 07/11/2017 1.6  g/dL Final   • BUN/Creatinine Ratio 07/11/2017 11.5  7.0 - 25.0 Final   • Anion Gap 07/11/2017 15.0  mmol/L Final   • Color, UA 07/11/2017 Yellow  Yellow, Straw Final   •  Appearance, UA 07/11/2017 Clear  Clear Final   • pH, UA 07/11/2017 7.5  5.0 - 8.0 Final   • Specific Gravity, UA 07/11/2017 1.007  1.005 - 1.030 Final   • Glucose, UA 07/11/2017 Negative  Negative Final   • Ketones, UA 07/11/2017 Negative  Negative Final   • Bilirubin, UA 07/11/2017 Negative  Negative Final   • Blood, UA 07/11/2017 Negative  Negative Final   • Protein, UA 07/11/2017 Negative  Negative Final   • Leuk Esterase, UA 07/11/2017 Negative  Negative Final   • Nitrite, UA 07/11/2017 Negative  Negative Final   • Urobilinogen, UA 07/11/2017 0.2 E.U./dL  0.2 - 1.0 E.U./dL Final   • WBC 07/11/2017 10.49  4.50 - 10.70 10*3/mm3 Final   • RBC 07/11/2017 5.19  4.60 - 6.00 10*6/mm3 Final   • Hemoglobin 07/11/2017 15.9  13.7 - 17.6 g/dL Final   • Hematocrit 07/11/2017 47.0  40.4 - 52.2 % Final   • MCV 07/11/2017 90.6  79.8 - 96.2 fL Final   • MCH 07/11/2017 30.6  27.0 - 32.7 pg Final   • MCHC 07/11/2017 33.8  32.6 - 36.4 g/dL Final   • RDW 07/11/2017 13.1  11.5 - 14.5 % Final   • RDW-SD 07/11/2017 43.1  37.0 - 54.0 fl Final   • MPV 07/11/2017 10.0  6.0 - 12.0 fL Final   • Platelets 07/11/2017 217  140 - 500 10*3/mm3 Final   • Neutrophil % 07/11/2017 61.0  42.7 - 76.0 % Final   • Lymphocyte % 07/11/2017 29.0  19.6 - 45.3 % Final   • Monocyte % 07/11/2017 7.0  5.0 - 12.0 % Final   • Eosinophil % 07/11/2017 3.0  0.3 - 6.2 % Final   • Neutrophils Absolute 07/11/2017 6.40  1.90 - 8.10 10*3/mm3 Final   • Lymphocytes Absolute 07/11/2017 3.04  0.90 - 4.80 10*3/mm3 Final   • Monocytes Absolute 07/11/2017 0.73  0.20 - 1.20 10*3/mm3 Final   • Eosinophils Absolute 07/11/2017 0.31  0.00 - 0.70 10*3/mm3 Final   • RBC Morphology 07/11/2017 Normal  Normal Final   • WBC Morphology 07/11/2017 Normal  Normal Final   • Platelet Morphology 07/11/2017 Normal  Normal Final       Mike was seen today for hemorrhoids.    Diagnoses and all orders for this visit:    Internal hemorrhoids    Other orders  -     hydrocortisone (ANUSOL-HC) 2.5 %  rectal cream; Insert  into the rectum 2 (Two) Times a Day. Use externally twice daily      Patient 66-year-old male with history of hep C status post therapy now cured for 15 years.  Patient complaining of intermittent prolapsing internal hemorrhoids referred now for recommendation.  At this point would recommend maintain a high-fiber diet and increase water intake will treat with 7-10 days of cortisone therapy topically and plan on infrared coagulation treatment next week.  Will await treatment to determine further recommendations.

## 2017-09-07 ENCOUNTER — OFFICE VISIT (OUTPATIENT)
Dept: GASTROENTEROLOGY | Facility: CLINIC | Age: 67
End: 2017-09-07

## 2017-09-07 VITALS
HEIGHT: 72 IN | TEMPERATURE: 98.4 F | DIASTOLIC BLOOD PRESSURE: 86 MMHG | SYSTOLIC BLOOD PRESSURE: 144 MMHG | WEIGHT: 217.4 LBS | BODY MASS INDEX: 29.45 KG/M2

## 2017-09-07 DIAGNOSIS — K64.8 INTERNAL HEMORRHOIDS: Primary | ICD-10-CM

## 2017-09-07 PROCEDURE — 46930 DESTROY INTERNAL HEMORRHOIDS: CPT | Performed by: INTERNAL MEDICINE

## 2017-09-07 NOTE — PROGRESS NOTES
IRC    Office Procedure    Mike Cox  66 y.o.  male  1950    HPI    Pt placed in L lateral position. Anoscopy performed revealing 2 Rodriguez of internal hemorrhoids at 12:00 and 5:00.    IRC perfomed using 1.2 seconds IRC times 3 and 3 respectively.    Pt tolerated procedure well.  Was discharged home.    Pt was instructed to continue current cream treatment BID for an additional 7 days.  Follow up as needed for continued symptoms.

## 2017-09-15 ENCOUNTER — TELEPHONE (OUTPATIENT)
Dept: GASTROENTEROLOGY | Facility: CLINIC | Age: 67
End: 2017-09-15

## 2017-09-15 NOTE — TELEPHONE ENCOUNTER
----- Message from Caitie Ramirez sent at 9/15/2017 12:03 PM EDT -----  Regarding: PT CALLED BACK  Contact: [phone]  PT CALLED BACK STATED CAN LEAVE A VOICEMAIL, HOW LONG DOES HEMORRHOIDS GOES DOWN AFTER HAVING A  IRC PROCEDURE

## 2017-09-15 NOTE — TELEPHONE ENCOUNTER
Patient called, he states he has one hemorrhoid that still feels swollen and wanted to give Dr. Still an update and wanted to know what to do next. Advised Dr. Still will be sent an update for advisement.  He verb understanding.

## 2017-09-15 NOTE — TELEPHONE ENCOUNTER
----- Message from Caitie Ramirez sent at 9/15/2017 10:22 AM EDT -----  Regarding: PT CALLED WITH QUESTIONS ABOUT HIS HEMORRHOIDS  Contact: [phone]  ....

## 2017-09-19 ENCOUNTER — TELEPHONE (OUTPATIENT)
Dept: GASTROENTEROLOGY | Facility: CLINIC | Age: 67
End: 2017-09-19

## 2017-09-19 NOTE — TELEPHONE ENCOUNTER
----- Message from Caitie Ramirez sent at 9/19/2017  8:44 AM EDT -----  Regarding: PT CALLED  Contact: [phone]  PT CALLED, ASKED FOR TAY TO GIVE UPDATE ON HOW HE WAS DOING, SO YOU CAN SPEAK WITH .  Returned patient's phone call, he states the nodule in his rectum is still present despite using the rectal cream.  Advised will give Dr. Still an update.   Patient called, advised as per verbal order of Dr. Still,  Patient will need to be seen by Dr. Lesli Sy for possible excision of his hemorrhoid. Patient states he already has an appt. With her on 10/10.

## 2017-10-09 RX ORDER — ATORVASTATIN CALCIUM 20 MG/1
20 TABLET, FILM COATED ORAL DAILY
Qty: 90 TABLET | Refills: 1 | Status: SHIPPED | OUTPATIENT
Start: 2017-10-09 | End: 2018-03-19 | Stop reason: SDUPTHER

## 2017-10-10 ENCOUNTER — OFFICE VISIT (OUTPATIENT)
Dept: SURGERY | Facility: CLINIC | Age: 67
End: 2017-10-10

## 2017-10-10 ENCOUNTER — TELEPHONE (OUTPATIENT)
Dept: CARDIOLOGY | Facility: CLINIC | Age: 67
End: 2017-10-10

## 2017-10-10 VITALS
TEMPERATURE: 97.9 F | SYSTOLIC BLOOD PRESSURE: 138 MMHG | BODY MASS INDEX: 29.93 KG/M2 | OXYGEN SATURATION: 97 % | HEART RATE: 83 BPM | DIASTOLIC BLOOD PRESSURE: 88 MMHG | WEIGHT: 221 LBS | HEIGHT: 72 IN

## 2017-10-10 DIAGNOSIS — K64.5 THROMBOSED HEMORRHOIDS: Primary | ICD-10-CM

## 2017-10-10 PROCEDURE — 99204 OFFICE O/P NEW MOD 45 MIN: CPT | Performed by: COLON & RECTAL SURGERY

## 2017-10-10 PROCEDURE — 46600 DIAGNOSTIC ANOSCOPY SPX: CPT | Performed by: COLON & RECTAL SURGERY

## 2017-10-10 RX ORDER — LISINOPRIL 10 MG/1
TABLET ORAL
Qty: 90 TABLET | Refills: 0 | Status: SHIPPED | OUTPATIENT
Start: 2017-10-10 | End: 2017-10-23 | Stop reason: DRUGHIGH

## 2017-10-10 RX ORDER — LISINOPRIL 20 MG/1
20 TABLET ORAL DAILY
Qty: 30 TABLET | Refills: 5 | Status: SHIPPED | OUTPATIENT
Start: 2017-10-10 | End: 2018-03-23 | Stop reason: SDUPTHER

## 2017-10-10 NOTE — PROGRESS NOTES
"Mike Cox is a 66 y.o. male who is seen as a consult at the request of Marcos Still MD for Hemorrhoids.      HPI:    Pt states he feels tissue when he wipes his bottom:  He feels a \"bulge as the side of the sphincter\"    Feels pressure when he coughs    A little seepage    Began 3-4 months ago    Has been using procto  Was using daily before IRC with Dr. Still: 9/7/2017    +hx polyps 20+ years ago    Most recent colonoscopy Dr. Ornelas 2013: no polys    No blood per rectum    Takes metamucil daily when he remembers    Takes Mobic s/p elbow surgery      Past Medical History:   Diagnosis Date   • Arthritis    • BPH (benign prostatic hyperplasia)    • Carotid artery disease     RIGHT   • Diabetes mellitus     pt reports A1C of 7.1 in 3/2016   • Elbow pain, right    • Heart murmur    • Hemorrhoids    • High cholesterol    • History of hepatitis C     In remission after Interferon and Ribavirin treatment   • Hypertension    • Vitamin D deficiency        Past Surgical History:   Procedure Laterality Date   • COLONOSCOPY N/A 09/10/2013    Normal, Repeat in 10 years, Dr. Ornelas   • COLONOSCOPY N/A 11/17/2006    Normal, Repeat in 5 years, Dr. [facility] Denise   • COLONOSCOPY N/A 08/25/2009    Normal, Repeat in 5 years, Dr. [facility] Denise   • CYSTOSCOPY     • SHOULDER SURGERY  1995    BONE SPURS   • TENNIS ELBOW RELEASE Right 7/18/2017    Procedure: RIGHT ELBOW OPEN FLEXOR TENDON DEBRIDEMENT AND REPAIR;  Surgeon: FABIO Hess MD;  [place] [facility] [place] [facility] [place]:        Social History:   reports that he quit smoking about 31 years ago. His smoking use included Cigarettes. He started smoking about 51 years ago. He has a 20.00 pack-year smoking history. He has never used smokeless tobacco. He reports that he does not drink alcohol or use illicit drugs.      Marriage status:     Family History   Problem Relation Age of Onset   • Heart disease Father    • Colon cancer Paternal Uncle      diagnosed in 60s "   • Diabetes Sister    • Malig Hyperthermia Neg Hx          Current Outpatient Prescriptions:   •  ACCU-CHEK FASTCLIX LANCETS misc, TEST TID, Disp: , Rfl: 5  •  ACCU-CHEK SMARTVIEW test strip, USE TO TEST BLOOD SUGAR BID, Disp: , Rfl: 6  •  aspirin (ASPIRIN ADULT LOW STRENGTH) 81 MG EC tablet, Take 1 tablet by mouth Daily. PT TO HOLD MED PRIOR TO OR, Disp: , Rfl:   •  atorvastatin (LIPITOR) 20 MG tablet, Take 1 tablet by mouth Daily., Disp: 90 tablet, Rfl: 1  •  glimepiride (AMARYL) 4 MG tablet, Take 4 mg by mouth 2 (Two) Times a Day Before Meals., Disp: , Rfl: 5  •  hydrocortisone (ANUSOL-HC) 2.5 % rectal cream, Insert  into the rectum 2 (Two) Times a Day. Use externally twice daily, Disp: 30 g, Rfl: 2  •  LANTUS SOLOSTAR 100 UNIT/ML injection pen, Inject 10 Units under the skin Every Morning., Disp: , Rfl: 5  •  lisinopril (PRINIVIL,ZESTRIL) 10 MG tablet, Take 1 tablet by mouth Daily., Disp: 90 tablet, Rfl: 1  •  meloxicam (MOBIC) 15 MG tablet, Take 15 mg by mouth Daily. INSTRUCTED PATIENT TO STOP FOR SURGERY, Disp: , Rfl:   •  omega-3 acid ethyl esters (LOVAZA) 1 G capsule, Take 2 g by mouth Daily. PT TO HOLD MED PRIOR TO OR, Disp: , Rfl:   •  tamsulosin (FLOMAX) 0.4 MG capsule 24 hr capsule, Take 1 capsule by mouth Every Night., Disp: , Rfl:   •  BD PEN NEEDLE SANDRA U/F 32G X 4 MM misc, USE ONCE DAILY UTD, Disp: , Rfl: 5  •  buPROPion XL (WELLBUTRIN XL) 150 MG 24 hr tablet, Take 1 tablet by mouth Every Morning., Disp: 90 tablet, Rfl: 2  •  lidocaine (L-M-X 4) 4 % cream, Apply  topically As Needed for Mild Pain ., Disp: 30 g, Rfl: 0  •  Linagliptin-Metformin HCl (JENTADUETO) 2.5-1000 MG tablet, Take 1 tablet by mouth 2 (two) times a day., Disp: , Rfl:     Allergy  Review of patient's allergies indicates no known allergies.    Review of Systems   Constitution: Negative for decreased appetite, weakness and weight gain.   HENT: Positive for congestion. Negative for hearing loss and hoarse voice.    Eyes: Negative for  blurred vision, discharge and visual disturbance.   Cardiovascular: Negative for chest pain, cyanosis and leg swelling.   Respiratory: Positive for cough. Negative for shortness of breath and snoring.    Endocrine: Negative for cold intolerance and heat intolerance.   Hematologic/Lymphatic: Does not bruise/bleed easily.   Skin: Negative for itching, poor wound healing and skin cancer.   Musculoskeletal: Negative for arthritis, back pain, joint pain and joint swelling.   Gastrointestinal: Negative for bloating, change in bowel habit, bowel incontinence and constipation.   Genitourinary: Negative for bladder incontinence, dysuria and hematuria.   Neurological: Negative for brief paralysis, excessive daytime sleepiness, dizziness, focal weakness and light-headedness.   Psychiatric/Behavioral: Negative for altered mental status and hallucinations. The patient does not have insomnia.    Allergic/Immunologic: Negative for HIV exposure and persistent infections.       Vitals:    10/10/17 0950   BP: 138/88   Pulse: 83   Temp: 97.9 °F (36.6 °C)   SpO2: 97%     Body mass index is 29.97 kg/(m^2).    Physical Exam   Constitutional: He is oriented to person, place, and time. He appears well-developed and well-nourished. No distress.   HENT:   Head: Normocephalic and atraumatic.   Nose: Nose normal.   Mouth/Throat: Oropharynx is clear and moist.   Eyes: Conjunctivae and EOM are normal. Pupils are equal, round, and reactive to light.   Neck: Normal range of motion. No tracheal deviation present.   Pulmonary/Chest: Effort normal and breath sounds normal. No respiratory distress.   Abdominal: Soft. Bowel sounds are normal. He exhibits no distension.   Genitourinary:   Genitourinary Comments: Perianal exam: external hem - small thrombosed left lateral hem.  Soft; no evidence ulceration or strangulation  VIJAYA- good tone, no masses  Anoscopy performed: IH x3    Musculoskeletal: Normal range of motion. He exhibits no edema or deformity.    Neurological: He is alert and oriented to person, place, and time. No cranial nerve deficit. Coordination and gait normal.   Skin: Skin is warm and dry.   Psychiatric: He has a normal mood and affect. His behavior is normal. Judgment normal.       Review of Medical Record:  I reviewed cy from 2013    Assessment:  1. Thrombosed hemorrhoids        Plan:  Discussed with patient as thrombosed hemorrhoid already resolving, I do not recommend excision, as his body will continue resolve,  Internal hem are not surgical at this time.    rtc prn  Scribed for Lesli Sy MD by Tg Meng PA-C 10/10/2017  This patient was evaluated by me, recommendations made, documentation reviewed, edited, and revised by me, Lesli Sy MD

## 2017-10-10 NOTE — TELEPHONE ENCOUNTER
Pt came by to the office to day to get his BP check.     BP reading 116/115    160/100  HR:90    Per RM pt need to increase his lisinopril to 20 mg daily and comeback for  week to get his BP check again......informed pt. Verbally understood        Donna GARCIA

## 2017-10-11 ENCOUNTER — TELEPHONE (OUTPATIENT)
Dept: SURGERY | Facility: CLINIC | Age: 67
End: 2017-10-11

## 2017-10-11 RX ORDER — AMLODIPINE BESYLATE 2.5 MG/1
2.5 TABLET ORAL DAILY
Qty: 30 TABLET | Refills: 11 | Status: SHIPPED | OUTPATIENT
Start: 2017-10-11 | End: 2018-01-23

## 2017-10-11 NOTE — TELEPHONE ENCOUNTER
Pt calls to report that today has BP is 184/105, he currently has no complaints just wanted to know if there were any other changes that you would advise.

## 2017-10-11 NOTE — TELEPHONE ENCOUNTER
Medication prescribed Hydrocortisone 2.5% Rectal cream has been discontinued.  Would you like to change medication?

## 2017-10-12 NOTE — TELEPHONE ENCOUNTER
Makenna,    The patient has a bp check on the 16th. Will they still need that BOP check appt or just schedule with Anna?    Mitchell

## 2017-10-13 NOTE — TELEPHONE ENCOUNTER
Pt called back with an update. His BP is running   150/175's-90/100.  He has an appt for a BP check on Monday, Oct 16th./[facility]    # [phone]

## 2017-10-16 ENCOUNTER — OFFICE VISIT (OUTPATIENT)
Dept: CARDIOLOGY | Facility: CLINIC | Age: 67
End: 2017-10-16

## 2017-10-16 DIAGNOSIS — I10 ESSENTIAL HYPERTENSION: Primary | ICD-10-CM

## 2017-10-16 PROCEDURE — 99211 OFF/OP EST MAY X REQ PHY/QHP: CPT | Performed by: INTERNAL MEDICINE

## 2017-10-16 NOTE — TELEPHONE ENCOUNTER
Pharmacy incorrect.  If he requests rx can change to analpram, apply internally and externally twice per day for 5 days, 1 oz tube, 1 refill.

## 2017-10-16 NOTE — PROGRESS NOTES
Pt presents in the office today for a B/P check. Verified medications.  increased his Lisinopril to 20 MG daily. His blood pressure reading today was: R:148/92 HR: 93 L:142/92 HR: 100. Pt c/o a little SOA, Fatigue and fatigue, He denies any CP,Edema and Palpitations.  wants him to take bystolic 5 MG daily along with the lisinopril 20 MG daily. I discuss this with .

## 2017-10-23 ENCOUNTER — OFFICE VISIT (OUTPATIENT)
Dept: CARDIOLOGY | Facility: CLINIC | Age: 67
End: 2017-10-23

## 2017-10-23 VITALS
DIASTOLIC BLOOD PRESSURE: 98 MMHG | SYSTOLIC BLOOD PRESSURE: 160 MMHG | WEIGHT: 219.5 LBS | HEIGHT: 72 IN | BODY MASS INDEX: 29.73 KG/M2 | HEART RATE: 85 BPM

## 2017-10-23 DIAGNOSIS — I10 ESSENTIAL HYPERTENSION: Primary | ICD-10-CM

## 2017-10-23 PROCEDURE — 99214 OFFICE O/P EST MOD 30 MIN: CPT | Performed by: NURSE PRACTITIONER

## 2017-10-23 PROCEDURE — 93000 ELECTROCARDIOGRAM COMPLETE: CPT | Performed by: NURSE PRACTITIONER

## 2017-10-23 RX ORDER — NEBIVOLOL 5 MG/1
7.5 TABLET ORAL DAILY
Qty: 45 TABLET | Refills: 5 | Status: SHIPPED | OUTPATIENT
Start: 2017-10-23 | End: 2018-01-23

## 2017-10-23 RX ORDER — NEBIVOLOL 5 MG/1
5 TABLET ORAL DAILY
Qty: 30 TABLET | Refills: 5 | Status: SHIPPED | OUTPATIENT
Start: 2017-10-23 | End: 2017-10-23 | Stop reason: SDUPTHER

## 2017-10-23 RX ORDER — MELATONIN
2000 DAILY
COMMUNITY
End: 2020-09-11

## 2017-10-23 RX ORDER — BUDESONIDE AND FORMOTEROL FUMARATE DIHYDRATE 160; 4.5 UG/1; UG/1
2 AEROSOL RESPIRATORY (INHALATION)
COMMUNITY
End: 2018-04-23

## 2017-10-23 RX ORDER — PREDNISONE 10 MG/1
10 TABLET ORAL DAILY
Refills: 0 | COMMUNITY
Start: 2017-10-18 | End: 2018-04-23

## 2017-10-23 NOTE — PROGRESS NOTES
Date of Office Visit: 10/23/2017  Encounter Provider: TOOTIE Christy  Place of Service: [facility] CARDIOLOGY  Patient Name: Mike Cox  :1950    Chief Complaint   Patient presents with   • Hypertension   :     HPI: Mike Cox is a 66 y.o. male comes in today for Follow-up for his hypertension.  He is a patient of Dr. Flores.  I am seeing him for the first time today and have reviewed his records.      He has a history of hypertension, hyperlipidemia, and diabetes.  He follows with Dr. Flores for his cardiovascular risks.  Over the years, he has had multiple stress studies performed.  He has had a Holter monitor in  showing premature ventricular contractions.  He has had normal vascular screenings.  In , he had a carotid duplex showing moderate intimal thickening of the right common carotid artery and with mild thickening of that carotid bulb but no thickening or stenosis.  He had repeat vascular screening in , which was normal.  His last stress test was in 2015 showing no evidence of ischemia and an ejection fraction of 57%.  His last echocardiogram was in 2014 showing an ejection fraction of 54% and grade 1 diastolic dysfunction.      He went to the emergency room at the beginning of 10/2017 for an acute respiratory infection.  Since this time, he has had persistent hypertension.  His lisinopril was increased to 20 mg daily.  He was started on Bystolic and his amlodipine was increased.      He comes in today for follow-up for his blood pressure.  He does bring a few blood pressure readings from where he is going to [facility] to get his blood pressure checked.  There, they are checking it manually.  He is still having some blood pressures in the 140s mmHg.  He denies any palpitations or tachycardia.  He denies any edema, chest pain, fatigue, orthopnea, or paroxysmal nocturnal dyspnea.  Since being on the Bystolic, he does feel a  little off balance at times he believes.  He has not been exercising and he has not been watching the salt in his diet.        Past Medical History:   Diagnosis Date   • Arthritis    • BPH (benign prostatic hyperplasia)    • Carotid artery disease     RIGHT   • Diabetes mellitus     pt reports A1C of 7.1 in 3/2016   • Elbow pain, right    • Heart murmur    • Hemorrhoids    • High cholesterol    • History of hepatitis C     In remission after Interferon and Ribavirin treatment   • Hypertension    • Vitamin D deficiency        Past Surgical History:   Procedure Laterality Date   • COLONOSCOPY N/A 09/10/2013    Normal, repeat in 10 years-Dr. Svetlana Ornelas   • COLONOSCOPY N/A 11/17/2006    Normal, repeat in 5 years-Dr. Jackson Denise   • COLONOSCOPY N/A 08/25/2009    Normal, repeat in 5 years-Dr. Jackson Denise   • CYSTOSCOPY     • SHOULDER ARTHROSCOPY Left    • SHOULDER SURGERY Bilateral 1995    BONE SPURS   • TENNIS ELBOW RELEASE Right 7/18/2017    Procedure: RIGHT ELBOW OPEN FLEXOR TENDON DEBRIDEMENT AND REPAIR;  Surgeon: FABIO Hess MD;  [facility];  Service:            Review of Systems   Constitution: Positive for malaise/fatigue. Negative for fever.   HENT: Negative for congestion, ear pain, hearing loss, nosebleeds and sore throat.    Eyes: Negative for double vision, pain, vision loss in left eye, vision loss in right eye and visual disturbance.   Cardiovascular: Negative for claudication and leg swelling.   Respiratory: Negative for cough, hemoptysis, shortness of breath, sleep disturbances due to breathing, snoring, sputum production and wheezing.    Endocrine: Negative.  Negative for cold intolerance, heat intolerance and polyuria.   Hematologic/Lymphatic: Negative.    Skin: Negative for color change, itching, poor wound healing and rash.   Musculoskeletal: Negative for falls, gout, joint pain, joint swelling, muscle cramps and myalgias.   Gastrointestinal: Negative for abdominal pain,  "diarrhea, dysphagia, hematemesis, melena, nausea and vomiting.   Genitourinary: Negative for bladder incontinence and hematuria.   Neurological: Negative for excessive daytime sleepiness, dizziness, headaches, light-headedness, loss of balance, paresthesias, seizures and vertigo.   Psychiatric/Behavioral: Negative for depression and substance abuse. The patient is not nervous/anxious.    All other systems reviewed and are negative.    All other systems reviewed and are negative    No Known Allergies    All aspects of family and social history reviewed.          Objective:     Vitals:    10/23/17 1009   BP: 160/98   BP Location: Right arm   Pulse: 85   Weight: 219 lb 8 oz (99.6 kg)   Height: 72\" (182.9 cm)     Body mass index is 29.77 kg/(m^2).    PHYSICAL EXAM:  Physical Exam   Constitutional: He is oriented to person, place, and time. He appears well-developed and well-nourished. No distress.   HENT:   Head: Normocephalic and atraumatic.   Eyes: Conjunctivae are normal. No scleral icterus.   Neck: Neck supple. No JVD present. Carotid bruit is not present. No thyromegaly present.   Cardiovascular: Normal rate, regular rhythm, S1 normal, S2 normal, normal heart sounds and intact distal pulses.   No extrasystoles are present. PMI is not displaced.  Exam reveals no gallop.    No murmur heard.  Pulses:       Carotid pulses are 2+ on the right side, and 2+ on the left side.       Radial pulses are 2+ on the right side, and 2+ on the left side.        Dorsalis pedis pulses are 2+ on the right side, and 2+ on the left side.        Posterior tibial pulses are 2+ on the right side, and 2+ on the left side.   Pulmonary/Chest: Effort normal and breath sounds normal. No respiratory distress. He has no wheezes. He has no rhonchi. He has no rales. He exhibits no tenderness.   Abdominal: Soft. Bowel sounds are normal. He exhibits no distension, no abdominal bruit and no mass. There is no tenderness.   Musculoskeletal: He exhibits " no edema or deformity.   Lymphadenopathy:     He has no cervical adenopathy.   Neurological: He is alert and oriented to person, place, and time. No cranial nerve deficit.   Skin: Skin is warm and dry. No rash noted. He is not diaphoretic. No cyanosis. No pallor. Nails show no clubbing.   Psychiatric: He has a normal mood and affect. Judgment normal.   Vitals reviewed.        ECG 12 Lead  Date/Time: 10/23/2017 1:16 PM  Performed by: KENNY ORTEGA  Authorized by: KENNY ORTEGA   Comparison: compared with previous ECG from 6/23/2017  Comparison to previous ECG: Rate improved  Rhythm: sinus rhythm  Rate: normal  BPM: 85  Conduction: LAFB  ST Segments: ST segments normal  T Waves: T waves normal  QRS axis: normal  Clinical impression: abnormal ECG  Comments: Indication: htn                Assessment:       Diagnosis Plan   1. Essential hypertension          No orders of the defined types were placed in this encounter.      Current Outpatient Prescriptions   Medication Sig Dispense Refill   • ACCU-CHEK FASTCLIX LANCETS misc TEST TID  5   • ACCU-CHEK SMARTVIEW test strip USE TO TEST BLOOD SUGAR BID  6   • aspirin (ASPIRIN ADULT LOW STRENGTH) 81 MG EC tablet Take 1 tablet by mouth Daily. PT TO HOLD MED PRIOR TO OR     • BD PEN NEEDLE SANDRA U/F 32G X 4 MM misc USE ONCE DAILY UTD  5   • budesonide-formoterol (SYMBICORT) 160-4.5 MCG/ACT inhaler Inhale 2 puffs 2 (Two) Times a Day.     • buPROPion XL (WELLBUTRIN XL) 150 MG 24 hr tablet Take 1 tablet by mouth Every Morning. 90 tablet 2   • cholecalciferol (VITAMIN D3) 1000 units tablet Take 2,000 Units by mouth Daily.     • Cyanocobalamin (VITAMIN B-12 PO) Take 5,000 mg by mouth Daily.     • glimepiride (AMARYL) 4 MG tablet Take 4 mg by mouth 2 (Two) Times a Day Before Meals.  5   • Linagliptin-Metformin HCl (JENTADUETO) 2.5-1000 MG tablet Take 1 tablet by mouth 2 (two) times a day.     • lisinopril (PRINIVIL,ZESTRIL) 20 MG tablet Take 1 tablet by mouth Daily. 30 tablet 5    • meloxicam (MOBIC) 15 MG tablet Take 15 mg by mouth Daily. INSTRUCTED PATIENT TO STOP FOR SURGERY     • omega-3 acid ethyl esters (LOVAZA) 1 G capsule Take 2 g by mouth Daily. PT TO HOLD MED PRIOR TO OR     • predniSONE (DELTASONE) 10 MG tablet Take 10 mg by mouth Daily.  0   • amLODIPine (NORVASC) 2.5 MG tablet Take 1 tablet by mouth Daily. (Patient taking differently: Take 5 mg by mouth Daily.) 30 tablet 11   • atorvastatin (LIPITOR) 20 MG tablet Take 1 tablet by mouth Daily. (Patient taking differently: Take 10 mg by mouth Daily.) 90 tablet 1   • LANTUS SOLOSTAR 100 UNIT/ML injection pen Inject 12 Units under the skin Every Morning.  5   • nebivolol (BYSTOLIC) 5 MG tablet Take 1.5 tablets by mouth Daily. 45 tablet 5   • tamsulosin (FLOMAX) 0.4 MG capsule 24 hr capsule Take 2 capsules by mouth Every Night.       No current facility-administered medications for this visit.             Plan:       1.  Hypertension-continues to be hypertensive here.  I've asked him to stop taking his meloxicam.  Will increase Bystolic to 7.5 mg daily.  If he is having low blood pressures with this, he will call.  He will send me blood pressures in one week.  He denies any decongestants.  Will reduce salt in his diet and begin exercising.    25 minutes face to face time with greater than 50% used for education regarding hypertension, exercise and medications.     Follow up in office in 1 month    As always, it has been a pleasure to participate in this patient's care.      Sincerely,      TOOTIE Christy

## 2017-10-25 ENCOUNTER — OFFICE VISIT (OUTPATIENT)
Dept: SURGERY | Facility: CLINIC | Age: 67
End: 2017-10-25

## 2017-10-25 ENCOUNTER — PREP FOR SURGERY (OUTPATIENT)
Dept: OTHER | Facility: HOSPITAL | Age: 67
End: 2017-10-25

## 2017-10-25 VITALS
OXYGEN SATURATION: 97 % | HEIGHT: 72 IN | BODY MASS INDEX: 29.77 KG/M2 | DIASTOLIC BLOOD PRESSURE: 84 MMHG | HEART RATE: 86 BPM | WEIGHT: 219.8 LBS | SYSTOLIC BLOOD PRESSURE: 138 MMHG

## 2017-10-25 DIAGNOSIS — E11.9 TYPE 2 DIABETES MELLITUS WITHOUT COMPLICATION, WITHOUT LONG-TERM CURRENT USE OF INSULIN (HCC): ICD-10-CM

## 2017-10-25 DIAGNOSIS — Z86.010 HISTORY OF COLON POLYPS: ICD-10-CM

## 2017-10-25 DIAGNOSIS — I10 ESSENTIAL HYPERTENSION: Primary | ICD-10-CM

## 2017-10-25 DIAGNOSIS — K43.9 VENTRAL HERNIA WITHOUT OBSTRUCTION OR GANGRENE: Primary | ICD-10-CM

## 2017-10-25 DIAGNOSIS — G47.33 OBSTRUCTIVE SLEEP APNEA, ADULT: ICD-10-CM

## 2017-10-25 PROBLEM — K42.9 UMBILICAL HERNIA WITHOUT OBSTRUCTION AND WITHOUT GANGRENE: Status: ACTIVE | Noted: 2017-10-25

## 2017-10-25 PROCEDURE — 99214 OFFICE O/P EST MOD 30 MIN: CPT | Performed by: SURGERY

## 2017-10-25 RX ORDER — CEFAZOLIN SODIUM 2 G/100ML
2 INJECTION, SOLUTION INTRAVENOUS ONCE
Status: CANCELLED | OUTPATIENT
Start: 2017-10-25 | End: 2017-10-25

## 2017-10-25 RX ORDER — SODIUM CHLORIDE 0.9 % (FLUSH) 0.9 %
1-10 SYRINGE (ML) INJECTION AS NEEDED
Status: CANCELLED | OUTPATIENT
Start: 2017-10-25

## 2017-10-25 NOTE — PROGRESS NOTES
Chief Complaint   Patient presents with   • Hernia     Umbilical        Mike Cox is a 66 y.o. male who is seen today In follow-up, after a visit in July 2016, when he presented with an asymptomatic umbilical hernia.  This was first noted in 2013 on exam by myself, but the patient says it is increasing in size, and is becoming mildly symptomatic.  It is definitely more protuberant, visible through his garments.  He is also noticing his ventral diastases more often, the symptoms are more exaggerated when he is standing, better when recumbent.    Complicating factors for an immediate consideration for surgery is an escalating hypertension.  Recently he has been in the 170s over 110's range, and his medication dosages are being about.    Also complicating the potential for surgery although well controlled, are his diabetes (managed by Dr. Ireland) with hemoglobin A1c at 6.6 now, and hepatitis C in remission.  It is suspected that this was due to a tattoo or IV drug abuse at an early age, he being completely recuperated from such now.    We discussed a screening colonoscopy today as well.  He has a remote history of polyps, but Cscopes in 2006, 2009, and most recently 2013 were all normal. He also has a family history of colon cancer in his father's brother, diagnosed in his sixties.  He is interested in proceeding with a colonoscopy in 2018.      Past Medical History:   Diagnosis Date   • Arthritis    • BPH (benign prostatic hyperplasia)    • Carotid artery disease     RIGHT   • Colon polyp    • Diabetes mellitus     type II, pt reports A1C of 7.1 in 3/2016   • Elbow pain, right    • Heart murmur    • Hemorrhoids    • High cholesterol    • History of hepatitis C     In remission after Interferon and Ribavirin treatment   • Hypertension    • Vitamin D deficiency      Past Surgical History:   Procedure Laterality Date   • COLONOSCOPY N/A 09/10/2013    Normal, repeat in 10 years-Dr. Svetlana Ornelas   • COLONOSCOPY  N/A 11/17/2006    Normal, repeat in 5 years-Dr. Jackson Denise   • COLONOSCOPY N/A 08/25/2009    Normal, repeat in 5 years-Dr. Jackson Denise   • CYSTOSCOPY     • SHOULDER ARTHROSCOPY Left    • SHOULDER SURGERY Bilateral 1995    BONE SPURS   • TENNIS ELBOW RELEASE Right 7/18/2017    Procedure: RIGHT ELBOW OPEN FLEXOR TENDON DEBRIDEMENT AND REPAIR;  Surgeon: FABIO Hess MD;  Location: [facility] OR [facility] [place];  Service:          Current Outpatient Prescriptions:   •  ACCU-CHEK FASTCLIX LANCETS misc, TEST TID, Disp: , Rfl: 5  •  ACCU-CHEK SMARTVIEW test strip, USE TO TEST BLOOD SUGAR BID, Disp: , Rfl: 6  •  amLODIPine (NORVASC) 2.5 MG tablet, Take 1 tablet by mouth Daily. (Patient taking differently: Take 5 mg by mouth Daily.), Disp: 30 tablet, Rfl: 11  •  aspirin (ASPIRIN ADULT LOW STRENGTH) 81 MG EC tablet, Take 1 tablet by mouth Daily. PT TO HOLD MED PRIOR TO OR, Disp: , Rfl:   •  atorvastatin (LIPITOR) 20 MG tablet, Take 1 tablet by mouth Daily. (Patient taking differently: Take 10 mg by mouth Daily.), Disp: 90 tablet, Rfl: 1  •  BD PEN NEEDLE SANDRA U/F 32G X 4 MM misc, USE ONCE DAILY UTD, Disp: , Rfl: 5  •  budesonide-formoterol (SYMBICORT) 160-4.5 MCG/ACT inhaler, Inhale 2 puffs 2 (Two) Times a Day., Disp: , Rfl:   •  cholecalciferol (VITAMIN D3) 1000 units tablet, Take 2,000 Units by mouth Daily., Disp: , Rfl:   •  Cyanocobalamin (VITAMIN B-12 PO), Take 5,000 mg by mouth Daily., Disp: , Rfl:   •  glimepiride (AMARYL) 4 MG tablet, Take 4 mg by mouth 2 (Two) Times a Day Before Meals., Disp: , Rfl: 5  •  LANTUS SOLOSTAR 100 UNIT/ML injection pen, Inject 12 Units under the skin Every Morning., Disp: , Rfl: 5  •  Linagliptin-Metformin HCl (JENTADUETO) 2.5-1000 MG tablet, Take 1 tablet by mouth 2 (two) times a day., Disp: , Rfl:   •  lisinopril (PRINIVIL,ZESTRIL) 20 MG tablet, Take 1 tablet by mouth Daily., Disp: 30 tablet, Rfl: 5  •  nebivolol (BYSTOLIC) 5 MG tablet, Take 1.5 tablets by mouth Daily., Disp: 45 tablet, Rfl:  "5  •  omega-3 acid ethyl esters (LOVAZA) 1 G capsule, Take 2 g by mouth Daily. PT TO HOLD MED PRIOR TO OR, Disp: , Rfl:   •  predniSONE (DELTASONE) 10 MG tablet, Take 10 mg by mouth Daily., Disp: , Rfl: 0  •  tamsulosin (FLOMAX) 0.4 MG capsule 24 hr capsule, Take 2 capsules by mouth Every Night., Disp: , Rfl:     No Known Allergies    Family History   Problem Relation Age of Onset   • Heart disease Father    • Colon cancer Paternal Uncle      diagnosed in 60s   • Diabetes Sister    • Lung disease Mother    • Malig Hyperthermia Neg Hx      Social History   Substance Use Topics   • Smoking status: Former Smoker     Packs/day: 1.00     Years: 20.00     Types: Cigarettes     Start date: 1966     Quit date: 1986   • Smokeless tobacco: Never Used   • Alcohol use No      Comment: sober for 32 years     Occupation/Social: 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     Preventative Medicine  Colonoscopy: 2013, Dr. Ornelas. Normal colonoscopy at this time. Prior history of polyps, but Cscope 2006 and 2009 also normal.     Review of Systems   Respiratory: Positive for wheezing.    All other systems reviewed and are negative.    Vitals:    10/25/17 1238   BP: 138/84   BP Location: Left arm   Patient Position: Sitting   Cuff Size: Adult   Pulse: 86   SpO2: 97%   Weight: 219 lb 12.8 oz (99.7 kg)   Height: 72\" (182.9 cm)     Body mass index is 29.81 kg/(m^2).    Physical Exam   Constitutional: He is oriented to person, place, and time. He appears well-developed and well-nourished.   HENT:   Head: Normocephalic and atraumatic.   Eyes: Conjunctivae are normal. No scleral icterus.   Neck: No tracheal deviation present.   Cardiovascular: Normal rate and regular rhythm.    No murmur heard.  Pulmonary/Chest: Effort normal. He has no wheezes.   Abdominal: Soft. Bowel sounds are normal. He exhibits no distension. There is no tenderness. Hernia: umbilical defect, approximately 1.7 cm, with ventral diastases, " estimated 2-1/2-3 cm.   Ventral diastasis noted   Musculoskeletal: Normal range of motion. He exhibits no deformity.   Neurological: He is alert and oriented to person, place, and time.   Skin: Skin is warm and dry.   Bilateral upper extremity tattoos on his forearms   Psychiatric: He has a normal mood and affect. His behavior is normal. Judgment and thought content normal.     Impression:  · Small ventral hernia, enlarging, mildly symptomatic  · Ventral diastasis  · Family history of colon cancer in father's brother at age 60  · Personal history of colon polyps, Cscopes in 2006, 2009 and 2013 normal  · Diabates, most recent A1C 6.6, well controlled  · History of hepatitis C, in remission  · Escalating hypertension, being medically managed with increasing doses    Plan:  · Screening colonoscopy in 2018.  Recommended that he use Suprep rather than OsmoPrep, in the context of his diabetes and hypertension and potential risks for kidney injury.  Case request entered  · Ventral hernia repair, possibly with mesh, although I think this is very unlikely, based on the size of the defect.  The only reason I would consider that, is his associated ventral diastases..  · Most notably, first she should get his hypertension under control.  I indicated that his elevated blood pressure would put him at increased risk from an anesthetic standpoint as well as a postoperative bleed.  He will call back after this seems to be stabilized.  Case request entered albeit.  · Hold diabetic medications day of procedure, Jentadueto and glimepiride.  · Omit insulin injection a.m. of procedure  · Prednisone presently being used will be weaned off by that time  · Continue all other medications perioperatively, except hold aspirin and Fish oil for one week      10/25/17     Svetlana Ornelas MD  5:28 PM

## 2017-10-31 ENCOUNTER — TELEPHONE (OUTPATIENT)
Dept: CARDIOLOGY | Facility: CLINIC | Age: 67
End: 2017-10-31

## 2017-11-12 ENCOUNTER — APPOINTMENT (OUTPATIENT)
Dept: CARDIOLOGY | Facility: HOSPITAL | Age: 67
End: 2017-11-12
Attending: INTERNAL MEDICINE

## 2017-11-17 ENCOUNTER — TELEPHONE (OUTPATIENT)
Dept: CARDIOLOGY | Facility: CLINIC | Age: 67
End: 2017-11-17

## 2017-11-17 NOTE — TELEPHONE ENCOUNTER
Received [place] approval on Bystolic 5mg 1.5 tablets daily    This has been approved from 11/15/17 through 12/31/17    Approval scanned into chart.

## 2017-12-05 ENCOUNTER — OFFICE VISIT (OUTPATIENT)
Dept: CARDIOLOGY | Facility: CLINIC | Age: 67
End: 2017-12-05

## 2017-12-05 VITALS
HEART RATE: 76 BPM | WEIGHT: 224 LBS | SYSTOLIC BLOOD PRESSURE: 136 MMHG | BODY MASS INDEX: 30.34 KG/M2 | DIASTOLIC BLOOD PRESSURE: 90 MMHG | HEIGHT: 72 IN

## 2017-12-05 DIAGNOSIS — I10 ESSENTIAL HYPERTENSION: Primary | ICD-10-CM

## 2017-12-05 PROCEDURE — 93000 ELECTROCARDIOGRAM COMPLETE: CPT | Performed by: NURSE PRACTITIONER

## 2017-12-05 PROCEDURE — 99213 OFFICE O/P EST LOW 20 MIN: CPT | Performed by: NURSE PRACTITIONER

## 2017-12-05 NOTE — PROGRESS NOTES
Date of Office Visit: 2017  Encounter Provider: TOOTIE Christy  Place of Service: [facility] CARDIOLOGY  Patient Name: Mike Cox  :1950    Chief Complaint   Patient presents with   • Essential hypertension     Follow up   :     HPI: Mike Cox is a 67 y.o. male comes in today for follow up for hypertension.     He has a history of hypertension, hyperlipidemia, and diabetes.  He follows with Dr. Flores for his cardiovascular risks.  Over the years, he has had multiple stress studies performed.  He has had a Holter monitor in  showing premature ventricular contractions.  He has had normal vascular screenings.  In , he had a carotid duplex showing moderate intimal thickening of the right common carotid artery and with mild thickening of that carotid bulb but no thickening or stenosis.  He had repeat vascular screening in , which was normal.  His last stress test was in 2015 showing no evidence of ischemia and an ejection fraction of 57%.  His last echocardiogram was in 2014 showing an ejection fraction of 54% and grade 1 diastolic dysfunction.      He went to the emergency room at the beginning of 10/2017 for an acute respiratory infection.  Since this time, he has had persistent hypertension.  His lisinopril was increased to 20 mg daily.  He was started on Bystolic and his amlodipine was increased.   Last office visit, I increased bystolic to 7.5mg daily.     He comes in today with a list of blood pressures, feeling well. His blood pressures have been better with the highest being 140/70. His blood sugars have been elevated. He is going to [facility] for management. Denies palpitations or tachycardia, shortness of breath, edema, dizziness, chest pain, fatigue, orthopnea or PND      Past Medical History:   Diagnosis Date   • Arthritis    • BPH (benign prostatic hyperplasia)    • Carotid artery disease     RIGHT   • Colon  polyp    • Diabetes mellitus     type II, pt reports A1C of 7.1 in 3/2016   • Elbow pain, right    • Heart murmur    • Hemorrhoids    • High cholesterol    • History of hepatitis C     In remission after Interferon and Ribavirin treatment   • Hypertension    • Vitamin D deficiency        Past Surgical History:   Procedure Laterality Date   • COLONOSCOPY N/A 09/10/2013    Normal, repeat in 10 years-Dr. Svetlana Ornelas   • COLONOSCOPY N/A 11/17/2006    Normal, repeat in 5 years-Dr. Jackson Denise   • COLONOSCOPY N/A 08/25/2009    Normal, repeat in 5 years-Dr. Jackson Denise   • CYSTOSCOPY     • SHOULDER ARTHROSCOPY Left    • SHOULDER SURGERY Bilateral 1995    BONE SPURS   • TENNIS ELBOW RELEASE Right 7/18/2017    Procedure: RIGHT ELBOW OPEN FLEXOR TENDON DEBRIDEMENT AND REPAIR;  Surgeon: FABIO Hess MD;  [place] [facility] [place] [facility] [place]  Service:            Review of Systems   Constitution: Negative for fever and malaise/fatigue.   HENT: Negative for ear pain, hearing loss, nosebleeds and sore throat.    Eyes: Negative for double vision, pain, vision loss in left eye, vision loss in right eye and visual disturbance.   Cardiovascular: Negative for claudication and leg swelling.   Respiratory: Negative for cough, snoring and wheezing.    Endocrine: Negative for cold intolerance, heat intolerance and polyuria.   Skin: Negative for color change, itching and rash.   Musculoskeletal: Negative for joint pain, joint swelling and muscle cramps.   Gastrointestinal: Negative for abdominal pain, diarrhea, melena, nausea and vomiting.   Genitourinary: Negative for bladder incontinence and hematuria.   Neurological: Negative for excessive daytime sleepiness, dizziness, light-headedness, paresthesias and seizures.   Psychiatric/Behavioral: Negative for depression. The patient is not nervous/anxious.    All other systems reviewed and are negative.    All other systems reviewed and are negative    No Known Allergies    All aspects of family  "and social history reviewed.          Objective:     Vitals:    12/05/17 0801   BP: 136/90   Pulse: 76   Weight: 224 lb (102 kg)   Height: 72\" (182.9 cm)     Body mass index is 30.38 kg/(m^2).    PHYSICAL EXAM:  Physical Exam   Constitutional: He is oriented to person, place, and time. He appears well-developed and well-nourished. No distress.   HENT:   Head: Normocephalic and atraumatic.   Eyes: Conjunctivae are normal. No scleral icterus.   Neck: Neck supple. No JVD present. Carotid bruit is not present. No thyromegaly present.   Cardiovascular: Normal rate, regular rhythm, S1 normal, S2 normal, normal heart sounds and intact distal pulses.   No extrasystoles are present. PMI is not displaced.  Exam reveals no gallop.    No murmur heard.  Pulses:       Carotid pulses are 2+ on the right side, and 2+ on the left side.       Radial pulses are 2+ on the right side, and 2+ on the left side.        Dorsalis pedis pulses are 2+ on the right side, and 2+ on the left side.        Posterior tibial pulses are 2+ on the right side, and 2+ on the left side.   Pulmonary/Chest: Effort normal and breath sounds normal. No respiratory distress. He has no wheezes. He has no rhonchi. He has no rales. He exhibits no tenderness.   Abdominal: Soft. Bowel sounds are normal. He exhibits no distension, no abdominal bruit and no mass. There is no tenderness.   Musculoskeletal: He exhibits no edema or deformity.   Lymphadenopathy:     He has no cervical adenopathy.   Neurological: He is alert and oriented to person, place, and time. No cranial nerve deficit.   Skin: Skin is warm and dry. No rash noted. He is not diaphoretic. No cyanosis. No pallor. Nails show no clubbing.   Psychiatric: He has a normal mood and affect. Judgment normal.   Vitals reviewed.        ECG 12 Lead  Date/Time: 12/5/2017 8:40 AM  Performed by: KENNY ORTEGA  Authorized by: KENNY ORTEGA   Comparison: compared with previous ECG from 10/23/2017  Similar to " previous ECG  Rhythm: sinus rhythm  Rate: normal  BPM: 76  Conduction: incomplete RBBB and LAFB  ST Segments: ST segments normal  T flattening: aVL  QRS axis: normal  Clinical impression: abnormal ECG  Comments: Indication: HTN                Assessment:       Diagnosis Plan   1. Essential hypertension          Orders Placed This Encounter   Procedures   • ECG 12 Lead     This order was created via procedure documentation       Current Outpatient Prescriptions   Medication Sig Dispense Refill   • ACCU-CHEK FASTCLIX LANCETS misc TEST TID  5   • ACCU-CHEK SMARTVIEW test strip USE TO TEST BLOOD SUGAR BID  6   • amLODIPine (NORVASC) 2.5 MG tablet Take 1 tablet by mouth Daily. (Patient taking differently: Take 5 mg by mouth Daily.) 30 tablet 11   • aspirin (ASPIRIN ADULT LOW STRENGTH) 81 MG EC tablet Take 1 tablet by mouth Daily. PT TO HOLD MED PRIOR TO OR     • atorvastatin (LIPITOR) 20 MG tablet Take 1 tablet by mouth Daily. (Patient taking differently: Take 10 mg by mouth Daily.) 90 tablet 1   • BD PEN NEEDLE SANDRA U/F 32G X 4 MM misc USE ONCE DAILY UTD  5   • budesonide-formoterol (SYMBICORT) 160-4.5 MCG/ACT inhaler Inhale 2 puffs 2 (Two) Times a Day.     • cholecalciferol (VITAMIN D3) 1000 units tablet Take 2,000 Units by mouth Daily.     • Cyanocobalamin (VITAMIN B-12 PO) Take 5,000 mg by mouth Daily.     • glimepiride (AMARYL) 4 MG tablet Take 4 mg by mouth 2 (Two) Times a Day Before Meals.  5   • LANTUS SOLOSTAR 100 UNIT/ML injection pen Inject 12 Units under the skin Every Morning.  5   • Linagliptin-Metformin HCl (JENTADUETO) 2.5-1000 MG tablet Take 1 tablet by mouth 2 (two) times a day.     • lisinopril (PRINIVIL,ZESTRIL) 20 MG tablet Take 1 tablet by mouth Daily. 30 tablet 5   • nebivolol (BYSTOLIC) 5 MG tablet Take 1.5 tablets by mouth Daily. 45 tablet 5   • omega-3 acid ethyl esters (LOVAZA) 1 G capsule Take 2 g by mouth Daily. PT TO HOLD MED PRIOR TO OR     • predniSONE (DELTASONE) 10 MG tablet Take 10 mg  by mouth Daily.  0   • tamsulosin (FLOMAX) 0.4 MG capsule 24 hr capsule Take 2 capsules by mouth Every Night.       No current facility-administered medications for this visit.             Plan:       HTN-bp more controlled on bystolic. Continue bystolic 7.5 mg daily along with lisinopril and amlodipine.     Diabetes-blood sugars elevated. Going to [facility] for evaluation for diabetes.         Follow up in office in 3 months with Dr. Flores    As always, it has been a pleasure to participate in this patient's care.      Sincerely,      TOOTIE Christy

## 2018-01-14 ENCOUNTER — HOSPITAL ENCOUNTER (OUTPATIENT)
Dept: CARDIOLOGY | Facility: HOSPITAL | Age: 68
Discharge: HOME | End: 2018-01-14
Attending: INTERNAL MEDICINE

## 2018-01-14 DIAGNOSIS — Z13.9 SCREENING: ICD-10-CM

## 2018-01-15 LAB
BH CV XLRA MEAS - PAD LEFT ABI PT: 1.13
BH CV XLRA MEAS - PAD LEFT ARM: 133 MMHG
BH CV XLRA MEAS - PAD LEFT LEG PT: 173 MMHG
BH CV XLRA MEAS - PAD RIGHT ABI PT: 1.07
BH CV XLRA MEAS - PAD RIGHT ARM: 153 MMHG
BH CV XLRA MEAS - PAD RIGHT LEG PT: 164 MMHG
BH CV XLRA MEAS - PROX AO DIAM: 1.5 CM
BH CV XLRA MEAS LEFT ICA/CCA RATIO: 0.35
BH CV XLRA MEAS LEFT MID CCA PSV: NORMAL CM/SEC
BH CV XLRA MEAS LEFT MID ICA PSV: NORMAL CM/SEC
BH CV XLRA MEAS LEFT PROX CCA EDV: -22.4 CM/SEC
BH CV XLRA MEAS LEFT PROX CCA PSV: -82.6 CM/SEC
BH CV XLRA MEAS LEFT PROX ECA PSV: -61.4 CM/SEC
BH CV XLRA MEAS LEFT PROX ICA EDV: -10 CM/SEC
BH CV XLRA MEAS LEFT PROX ICA PSV: -28.7 CM/SEC
BH CV XLRA MEAS RIGHT ICA/CCA RATIO: 0.84
BH CV XLRA MEAS RIGHT MID CCA PSV: NORMAL CM/SEC
BH CV XLRA MEAS RIGHT MID ICA PSV: NORMAL CM/SEC
BH CV XLRA MEAS RIGHT PROX CCA EDV: 21.7 CM/SEC
BH CV XLRA MEAS RIGHT PROX CCA PSV: 78.9 CM/SEC
BH CV XLRA MEAS RIGHT PROX ECA PSV: -80.1 CM/SEC
BH CV XLRA MEAS RIGHT PROX ICA EDV: -18 CM/SEC
BH CV XLRA MEAS RIGHT PROX ICA PSV: -67.1 CM/SEC

## 2018-01-23 ENCOUNTER — OFFICE VISIT (OUTPATIENT)
Dept: CARDIOLOGY | Facility: CLINIC | Age: 68
End: 2018-01-23

## 2018-01-23 VITALS
DIASTOLIC BLOOD PRESSURE: 88 MMHG | SYSTOLIC BLOOD PRESSURE: 128 MMHG | HEIGHT: 72 IN | HEART RATE: 77 BPM | BODY MASS INDEX: 29.93 KG/M2 | WEIGHT: 221 LBS

## 2018-01-23 DIAGNOSIS — R06.09 DYSPNEA ON EXERTION: ICD-10-CM

## 2018-01-23 DIAGNOSIS — I10 ESSENTIAL HYPERTENSION: Primary | ICD-10-CM

## 2018-01-23 DIAGNOSIS — E11.9 TYPE 2 DIABETES MELLITUS WITHOUT COMPLICATION, WITHOUT LONG-TERM CURRENT USE OF INSULIN (HCC): ICD-10-CM

## 2018-01-23 DIAGNOSIS — E78.5 HYPERLIPIDEMIA, UNSPECIFIED HYPERLIPIDEMIA TYPE: ICD-10-CM

## 2018-01-23 DIAGNOSIS — J44.9 CHRONIC OBSTRUCTIVE PULMONARY DISEASE, UNSPECIFIED COPD TYPE (HCC): ICD-10-CM

## 2018-01-23 PROCEDURE — 99214 OFFICE O/P EST MOD 30 MIN: CPT | Performed by: INTERNAL MEDICINE

## 2018-01-23 PROCEDURE — 93000 ELECTROCARDIOGRAM COMPLETE: CPT | Performed by: INTERNAL MEDICINE

## 2018-01-23 RX ORDER — BENZONATATE 200 MG/1
200 CAPSULE ORAL AS NEEDED
Refills: 0 | COMMUNITY
Start: 2017-12-29 | End: 2018-04-23

## 2018-01-23 RX ORDER — CHLORAL HYDRATE 500 MG
1000 CAPSULE ORAL
COMMUNITY
End: 2020-09-11

## 2018-01-23 RX ORDER — FLUTICASONE PROPIONATE 50 MCG
1 SPRAY, SUSPENSION (ML) NASAL AS NEEDED
Refills: 3 | COMMUNITY
Start: 2017-12-29 | End: 2018-04-23

## 2018-01-23 RX ORDER — NEBIVOLOL 10 MG/1
10 TABLET ORAL DAILY
Qty: 90 TABLET | Refills: 3 | Status: SHIPPED | OUTPATIENT
Start: 2018-01-23 | End: 2019-03-24 | Stop reason: SDUPTHER

## 2018-01-23 NOTE — PROGRESS NOTES
Date of Office Visit: 2018  Encounter Provider: Isabelle Flores MD  Place of Service: [facility] CARDIOLOGY  Patient Name: Mike Cox  :1950      Patient ID:  Mike Cox is a 67 y.o. male is here for  followup for labile BP.         History of Present Illness    He has hypertension, hyperlipidemia, diabetes mellitus type 2, and obstructive sleep  apnea. He first presented to the office for chest pain. Because of his cardiovascular  risks, he had an exercise nuclear stress study done in  which was negative for  ischemia. He had a lot of fatigue at that time and palpitations which were treated and  got better. His Holter recording done in 2008 showed premature ventricular complexes.   He had vascular screening done on 2009 which was normal. He had a cardiac duplex   study performed on 2011 which showed moderate intimal thickening of the right   common carotid artery with mild thickening of that carotid bulb but no thickening of his stenosis.   He presented in 2008 with fatigue and had a stress nuclear perfusion study showing no evidence   of ischemia. His echocardiogram then showed an ejection fraction of 65% with grade I diastolic dysfunction  and no significant valvular heart problems.       His last echocardiogram done 2011, showed an ejection fraction of 54% with grade 1  diastolic dysfunction and no significant valvular heart problems. He had a stress nuclear 2012  at [facility] and had no ischemia.      He sees Dr. Ireland from endocrinology for his lipid management. He did see Dr. Sampson for   PAULETTE and COPD.          He had some mild dyspnea with exertion at his last visit and called and said it  was getting even worse, so he had a stress nuclear perfusion study done on 2015,  showing no evidence of ischemia and ejection fraction of 57%. He had a 2-D echocardiogram  with Doppler done on 2014, showing an ejection  fraction of 54% and grade 1 diastolic  dysfunction. He had carotid duplex studies done on 04/28/2015, which showed moderate  intimal thickening of the right internal carotid artery and moderate plaque in the left  internal carotid artery.       He had vascular screening done on 03/20/2016, which showed plaquing in both carotid bulbs but it was otherwise unremarkable.        The patient had vascular screening done 06/06/2017, which was normal.       He was diagnosed with an upper respiratory tract infection present the emergency department due to severe dyspnea and cough.  When he was arrived there, his blood pressure was 180/110.  They treated his blood pressure and allowed him to go.  He then came back to the office for repeat blood pressure check and it was 170/110.  He saw Anna vessels in follow-up.  She start amlodipine and then also increased bystolic.  Since then, his blood pressure has been better but the diastolic is still high.  In addition he's noticed dyspnea on exertion as well as significant fatigue despite getting adequate sleep.  He doesn't feels heart racing or skipping.  He's having no chest pain or pressure.  He just doesn't overall feel well.       Past Medical History:   Diagnosis Date   • Arthritis    • BPH (benign prostatic hyperplasia)    • Carotid artery disease     RIGHT   • Colon polyp    • Diabetes mellitus     type II, pt reports A1C of 7.1 in 3/2016   • Elbow pain, right    • Heart murmur    • Hemorrhoids    • High cholesterol    • History of hepatitis C     In remission after Interferon and Ribavirin treatment   • Hypertension    • Vitamin D deficiency          Past Surgical History:   Procedure Laterality Date   • COLONOSCOPY N/A 09/10/2013    Normal, repeat in 10 years-Dr. Svetlana Ornelas   • COLONOSCOPY N/A 11/17/2006    Normal, repeat in 5 years-Dr. Jackson Denise   • COLONOSCOPY N/A 08/25/2009    Normal, repeat in 5 years-Dr. Jackson Denise   • CYSTOSCOPY     • SHOULDER ARTHROSCOPY  Left    • SHOULDER SURGERY Bilateral 1995    BONE SPURS   • TENNIS ELBOW RELEASE Right 7/18/2017    Procedure: RIGHT ELBOW OPEN FLEXOR TENDON DEBRIDEMENT AND REPAIR;  Surgeon: FABIO Hess MD;  [place] [facility] [place] [facility] [place]  Service:        Current Outpatient Prescriptions on File Prior to Visit   Medication Sig Dispense Refill   • ACCU-CHEK FASTCLIX LANCETS misc TEST TID  5   • ACCU-CHEK SMARTVIEW test strip USE TO TEST BLOOD SUGAR BID  6   • amLODIPine (NORVASC) 2.5 MG tablet Take 1 tablet by mouth Daily. (Patient taking differently: Take 5 mg by mouth Daily.) 30 tablet 11   • aspirin (ASPIRIN ADULT LOW STRENGTH) 81 MG EC tablet Take 1 tablet by mouth Daily. PT TO HOLD MED PRIOR TO OR     • atorvastatin (LIPITOR) 20 MG tablet Take 1 tablet by mouth Daily. (Patient taking differently: Take 10 mg by mouth Daily.) 90 tablet 1   • BD PEN NEEDLE SANDRA U/F 32G X 4 MM misc USE ONCE DAILY UTD  5   • budesonide-formoterol (SYMBICORT) 160-4.5 MCG/ACT inhaler Inhale 2 puffs 2 (Two) Times a Day. As needed     • cholecalciferol (VITAMIN D3) 1000 units tablet Take 2,000 Units by mouth Daily.     • Cyanocobalamin (VITAMIN B-12 PO) Take 5,000 mg by mouth Daily.     • glimepiride (AMARYL) 4 MG tablet Take 4 mg by mouth 2 (Two) Times a Day Before Meals.  5   • LANTUS SOLOSTAR 100 UNIT/ML injection pen Inject 12 Units under the skin Every Morning.  5   • Linagliptin-Metformin HCl (JENTADUETO) 2.5-1000 MG tablet Take 1 tablet by mouth 2 (two) times a day.     • lisinopril (PRINIVIL,ZESTRIL) 20 MG tablet Take 1 tablet by mouth Daily. 30 tablet 5   • nebivolol (BYSTOLIC) 5 MG tablet Take 1.5 tablets by mouth Daily. 45 tablet 5   • predniSONE (DELTASONE) 10 MG tablet Take 10 mg by mouth Daily. As needed  0   • tamsulosin (FLOMAX) 0.4 MG capsule 24 hr capsule Take 2 capsules by mouth Daily.     • [DISCONTINUED] omega-3 acid ethyl esters (LOVAZA) 1 G capsule Take 2 g by mouth Daily. PT TO HOLD MED PRIOR TO OR       No current  facility-administered medications on file prior to visit.        Social History     Social History   • Marital status:      Spouse name: Lucy Cox   • Number of children: N/A   • Years of education: N/A     Occupational History   • Psychotherapist/ Other     [employer]     Social History Main Topics   • Smoking status: Former Smoker     Packs/day: 1.00     Years: 20.00     Types: Cigarettes     Start date: 1966     Quit date: 1986   • Smokeless tobacco: Never Used      Comment: Caffeine use 3 cups daily   • Alcohol use No      Comment: sober for 32 years   • Drug use: No   • Sexual activity: Defer     Other Topics Concern   • Not on file     Social History Narrative           Review of Systems   Constitution: Negative.   HENT: Negative for congestion.    Eyes: Negative for vision loss in left eye and vision loss in right eye.   Respiratory: Negative.  Negative for cough, hemoptysis, shortness of breath, sleep disturbances due to breathing, snoring, sputum production and wheezing.    Endocrine: Negative.    Hematologic/Lymphatic: Negative.    Skin: Negative for poor wound healing and rash.   Musculoskeletal: Negative for falls, gout, muscle cramps and myalgias.   Gastrointestinal: Negative for abdominal pain, diarrhea, dysphagia, hematemesis, melena, nausea and vomiting.   Neurological: Negative for excessive daytime sleepiness, dizziness, headaches, light-headedness, loss of balance, seizures and vertigo.   Psychiatric/Behavioral: Negative for depression and substance abuse. The patient is not nervous/anxious.        Procedures    ECG 12 Lead  Date/Time: 1/23/2018 10:14 AM  Performed by: JOSE CHANG  Authorized by: JOSE CHANG   Comparison: compared with previous ECG   Similar to previous ECG  Rhythm: sinus rhythm  Conduction: LAFB  Clinical impression: abnormal ECG               Objective:      Vitals:    01/23/18 1007 01/23/18 1008   BP: 132/90 128/88   BP Location:   "Right arm   Pulse: 77    Weight: 100 kg (221 lb)    Height: 182.9 cm (72\")      Body mass index is 29.97 kg/(m^2).    Physical Exam   Constitutional: He is oriented to person, place, and time. He appears well-developed and well-nourished. No distress.   HENT:   Head: Normocephalic and atraumatic.   Eyes: Conjunctivae are normal. No scleral icterus.   Neck: Neck supple. No JVD present. Carotid bruit is not present. No thyromegaly present.   Cardiovascular: Normal rate, regular rhythm, S1 normal, S2 normal, normal heart sounds and intact distal pulses.   No extrasystoles are present. PMI is not displaced.  Exam reveals no gallop.    No murmur heard.  Pulses:       Carotid pulses are 2+ on the right side, and 2+ on the left side.       Radial pulses are 2+ on the right side, and 2+ on the left side.        Dorsalis pedis pulses are 2+ on the right side, and 2+ on the left side.        Posterior tibial pulses are 2+ on the right side, and 2+ on the left side.   Pulmonary/Chest: Effort normal. No respiratory distress. He has wheezes in the left upper field. He has no rhonchi. He has no rales. He exhibits no tenderness.   Abdominal: Soft. Bowel sounds are normal. He exhibits no distension, no abdominal bruit and no mass. There is no tenderness.   Musculoskeletal: He exhibits no edema or deformity.   Lymphadenopathy:     He has no cervical adenopathy.   Neurological: He is alert and oriented to person, place, and time. No cranial nerve deficit.   Skin: Skin is warm and dry. No rash noted. He is not diaphoretic. No cyanosis. No pallor. Nails show no clubbing.   Psychiatric: He has a normal mood and affect. Judgment normal.   Vitals reviewed.      Lab Review:       Assessment:      Diagnosis Plan   1. Essential hypertension     2. Hyperlipidemia, unspecified hyperlipidemia type     3. Chronic obstructive pulmonary disease, unspecified COPD type     4. Type 2 diabetes mellitus without complication, without long-term current " use of insulin       1. Normal stress perfusion study 8/2012 and 1/2015.  2. Abnormal ECG. Left anterior fascicular block which is chronic.  3. Hypertension, labile.   4. Diabetes mellitus type 2. Stable.  5. Obstructive sleep apnea on CPAP. Getting retested for this.  6. Hyperlipidemia.   7. Hepatitis C, stable.  8. History of rheumatoid fever, stable. No valvular heart problems.   9. History of gout.  10. Plaquing of the left carotid artery.  11. Dyspnea on exertion with fatigue.      Plan:       Set up a stress echocardiogram for dyspnea on exertion.  Increased bystolic to 10 mg daily and stopping amlodipine.

## 2018-01-24 ENCOUNTER — TELEPHONE (OUTPATIENT)
Dept: FAMILY MEDICINE CLINIC | Facility: CLINIC | Age: 68
End: 2018-01-24

## 2018-02-01 ENCOUNTER — HOSPITAL ENCOUNTER (OUTPATIENT)
Dept: CARDIOLOGY | Facility: HOSPITAL | Age: 68
Discharge: HOME | End: 2018-02-01
Attending: INTERNAL MEDICINE | Admitting: INTERNAL MEDICINE

## 2018-02-01 VITALS
DIASTOLIC BLOOD PRESSURE: 88 MMHG | WEIGHT: 221 LBS | HEART RATE: 83 BPM | HEIGHT: 71 IN | SYSTOLIC BLOOD PRESSURE: 140 MMHG | BODY MASS INDEX: 30.94 KG/M2

## 2018-02-01 DIAGNOSIS — I10 ESSENTIAL HYPERTENSION: ICD-10-CM

## 2018-02-01 DIAGNOSIS — E78.5 HYPERLIPIDEMIA, UNSPECIFIED HYPERLIPIDEMIA TYPE: ICD-10-CM

## 2018-02-01 DIAGNOSIS — R06.09 DYSPNEA ON EXERTION: ICD-10-CM

## 2018-02-01 DIAGNOSIS — E11.9 TYPE 2 DIABETES MELLITUS WITHOUT COMPLICATION, WITHOUT LONG-TERM CURRENT USE OF INSULIN (HCC): ICD-10-CM

## 2018-02-01 LAB
ASCENDING AORTA: 3.2 CM
BH CV ECHO MEAS - ACS: 2 CM
BH CV ECHO MEAS - AO MAX PG: 5.7 MMHG
BH CV ECHO MEAS - AO ROOT AREA (BSA CORRECTED): 1.4
BH CV ECHO MEAS - AO ROOT AREA: 7.8 CM^2
BH CV ECHO MEAS - AO ROOT DIAM: 3.1 CM
BH CV ECHO MEAS - AO V2 MAX: 119.4 CM/SEC
BH CV ECHO MEAS - BSA(HAYCOCK): 2.3 M^2
BH CV ECHO MEAS - BSA: 2.2 M^2
BH CV ECHO MEAS - BZI_BMI: 30 KILOGRAMS/M^2
BH CV ECHO MEAS - BZI_METRIC_HEIGHT: 182.9 CM
BH CV ECHO MEAS - BZI_METRIC_WEIGHT: 100.2 KG
BH CV ECHO MEAS - CONTRAST EF 4CH: 59.3 ML/M^2
BH CV ECHO MEAS - EDV(MOD-SP4): 118 ML
BH CV ECHO MEAS - EDV(TEICH): 113.7 ML
BH CV ECHO MEAS - EF(CUBED): 69.7 %
BH CV ECHO MEAS - EF(MOD-SP4): 59.3 %
BH CV ECHO MEAS - EF(TEICH): 61.1 %
BH CV ECHO MEAS - ESV(MOD-SP4): 48 ML
BH CV ECHO MEAS - ESV(TEICH): 44.2 ML
BH CV ECHO MEAS - FS: 32.8 %
BH CV ECHO MEAS - IVS/LVPW: 1.1
BH CV ECHO MEAS - IVSD: 1 CM
BH CV ECHO MEAS - LAT PEAK E' VEL: 7 CM/SEC
BH CV ECHO MEAS - LV DIASTOLIC VOL/BSA (35-75): 53.1 ML/M^2
BH CV ECHO MEAS - LV MASS(C)D: 176.7 GRAMS
BH CV ECHO MEAS - LV MASS(C)DI: 79.5 GRAMS/M^2
BH CV ECHO MEAS - LV SYSTOLIC VOL/BSA (12-30): 21.6 ML/M^2
BH CV ECHO MEAS - LVIDD: 4.9 CM
BH CV ECHO MEAS - LVIDS: 3.3 CM
BH CV ECHO MEAS - LVLD AP4: 7.9 CM
BH CV ECHO MEAS - LVLS AP4: 6.8 CM
BH CV ECHO MEAS - LVPWD: 0.96 CM
BH CV ECHO MEAS - MED PEAK E' VEL: 5 CM/SEC
BH CV ECHO MEAS - MV A DUR: 0.12 SEC
BH CV ECHO MEAS - MV A MAX VEL: 70.8 CM/SEC
BH CV ECHO MEAS - MV DEC SLOPE: 205.7 CM/SEC^2
BH CV ECHO MEAS - MV DEC TIME: 0.25 SEC
BH CV ECHO MEAS - MV E MAX VEL: 50.4 CM/SEC
BH CV ECHO MEAS - MV E/A: 0.71
BH CV ECHO MEAS - MV P1/2T MAX VEL: 51.9 CM/SEC
BH CV ECHO MEAS - MV P1/2T: 73.9 MSEC
BH CV ECHO MEAS - MVA P1/2T LCG: 4.2 CM^2
BH CV ECHO MEAS - MVA(P1/2T): 3 CM^2
BH CV ECHO MEAS - PULM A REVS DUR: 0.11 SEC
BH CV ECHO MEAS - PULM A REVS VEL: 29 CM/SEC
BH CV ECHO MEAS - PULM DIAS VEL: 40 CM/SEC
BH CV ECHO MEAS - PULM S/D: 1
BH CV ECHO MEAS - PULM SYS VEL: 40.5 CM/SEC
BH CV ECHO MEAS - SI(CUBED): 37.3 ML/M^2
BH CV ECHO MEAS - SI(MOD-SP4): 31.5 ML/M^2
BH CV ECHO MEAS - SI(TEICH): 31.3 ML/M^2
BH CV ECHO MEAS - SV(CUBED): 82.8 ML
BH CV ECHO MEAS - SV(MOD-SP4): 70 ML
BH CV ECHO MEAS - SV(TEICH): 69.5 ML
BH CV ECHO MEAS - TAPSE (>1.6): 2.3 CM2
BH CV ECHO MEAS - TR MAX VEL: 233.5 CM/SEC
BH CV STRESS BP STAGE 1: NORMAL
BH CV STRESS BP STAGE 2: NORMAL
BH CV STRESS BP STAGE 3: NORMAL
BH CV STRESS DURATION MIN STAGE 1: 3
BH CV STRESS DURATION MIN STAGE 2: 3
BH CV STRESS DURATION MIN STAGE 3: 3
BH CV STRESS DURATION SEC STAGE 1: 0
BH CV STRESS DURATION SEC STAGE 2: 0
BH CV STRESS DURATION SEC STAGE 3: 0
BH CV STRESS DURATION SEC STAGE 4: 30
BH CV STRESS ECHO POST STRESS EJECTION FRACTION EF: 68 %
BH CV STRESS GRADE STAGE 1: 10
BH CV STRESS GRADE STAGE 2: 12
BH CV STRESS GRADE STAGE 3: 14
BH CV STRESS GRADE STAGE 4: 16
BH CV STRESS HR STAGE 1: 97
BH CV STRESS HR STAGE 2: 112
BH CV STRESS HR STAGE 3: 136
BH CV STRESS HR STAGE 4: 146
BH CV STRESS METS STAGE 1: 5
BH CV STRESS METS STAGE 2: 7.5
BH CV STRESS METS STAGE 3: 10
BH CV STRESS METS STAGE 4: 13.5
BH CV STRESS PROTOCOL 1: NORMAL
BH CV STRESS SPEED STAGE 1: 1.7
BH CV STRESS SPEED STAGE 2: 2.5
BH CV STRESS SPEED STAGE 3: 3.4
BH CV STRESS SPEED STAGE 4: 4.2
BH CV STRESS STAGE 1: 1
BH CV STRESS STAGE 2: 2
BH CV STRESS STAGE 3: 3
BH CV STRESS STAGE 4: 4
BH CV XLRA - RV BASE: 2.8 CM
BH CV XLRA - TDI S': 12 CM/SEC
E/E' RATIO: 8
LEFT ATRIUM VOLUME INDEX: 15 ML/M2
MAXIMAL PREDICTED HEART RATE: 152 BPM
PERCENT MAX PREDICTED HR: 96.05 %
SINUS: 2.9 CM
STJ: 2.8 CM
STRESS BASELINE BP: NORMAL MMHG
STRESS BASELINE HR: 83 BPM
STRESS PERCENT HR: 113 %
STRESS POST ESTIMATED WORKLOAD: 11 METS
STRESS POST EXERCISE DUR MIN: 9 MIN
STRESS POST EXERCISE DUR SEC: 30 SEC
STRESS POST PEAK BP: NORMAL MMHG
STRESS POST PEAK HR: 146 BPM
STRESS TARGET HR: 129 BPM

## 2018-02-01 PROCEDURE — 93350 STRESS TTE ONLY: CPT | Performed by: INTERNAL MEDICINE

## 2018-02-01 PROCEDURE — 93350 STRESS TTE ONLY: CPT

## 2018-02-01 PROCEDURE — 93325 DOPPLER ECHO COLOR FLOW MAPG: CPT | Performed by: INTERNAL MEDICINE

## 2018-02-01 PROCEDURE — 93320 DOPPLER ECHO COMPLETE: CPT

## 2018-02-01 PROCEDURE — 93016 CV STRESS TEST SUPVJ ONLY: CPT | Performed by: INTERNAL MEDICINE

## 2018-02-01 PROCEDURE — 93018 CV STRESS TEST I&R ONLY: CPT | Performed by: INTERNAL MEDICINE

## 2018-02-01 PROCEDURE — 93017 CV STRESS TEST TRACING ONLY: CPT

## 2018-02-01 PROCEDURE — 93325 DOPPLER ECHO COLOR FLOW MAPG: CPT

## 2018-02-01 PROCEDURE — 93320 DOPPLER ECHO COMPLETE: CPT | Performed by: INTERNAL MEDICINE

## 2018-02-05 ENCOUNTER — TELEPHONE (OUTPATIENT)
Dept: CARDIOLOGY | Facility: CLINIC | Age: 68
End: 2018-02-05

## 2018-02-05 NOTE — TELEPHONE ENCOUNTER
----- Message from Isabelle Flores MD sent at 2/5/2018  4:20 PM EST -----  Called and spoke with him - will try to lose weight and wants to wait on invasive testing until I see him in 3 months.  He is stable currently.

## 2018-02-28 ENCOUNTER — OFFICE VISIT (OUTPATIENT)
Dept: SURGERY | Facility: CLINIC | Age: 68
End: 2018-02-28

## 2018-02-28 VITALS
OXYGEN SATURATION: 98 % | DIASTOLIC BLOOD PRESSURE: 84 MMHG | WEIGHT: 218.7 LBS | HEART RATE: 63 BPM | SYSTOLIC BLOOD PRESSURE: 132 MMHG | TEMPERATURE: 97.6 F | HEIGHT: 71 IN | BODY MASS INDEX: 30.62 KG/M2

## 2018-02-28 DIAGNOSIS — R15.1 FECAL SMEARING: Primary | ICD-10-CM

## 2018-02-28 PROCEDURE — 46600 DIAGNOSTIC ANOSCOPY SPX: CPT | Performed by: COLON & RECTAL SURGERY

## 2018-02-28 PROCEDURE — 99212 OFFICE O/P EST SF 10 MIN: CPT | Performed by: COLON & RECTAL SURGERY

## 2018-02-28 RX ORDER — BUPROPION HYDROCHLORIDE 150 MG/1
TABLET ORAL
Refills: 0 | COMMUNITY
Start: 2018-02-15 | End: 2018-02-28

## 2018-02-28 NOTE — PROGRESS NOTES
"Mike Cox is a 67 y.o. male in for follow up of hemorrhoids    Patient states that his hemorrhoids are fine, but he has been having someanal leakage  He has been using wet wipes  He wipes his bottom every 2-3 hours and there are smears of stool    He states stools have been formed, gritty    Wiping has been better in the past couple of weeks    He does not take a fiber supplement  He states he has a high-fiber diet    /84  Pulse 63  Temp 97.6 °F (36.4 °C)  Ht 180.3 cm (71\")  Wt 99.2 kg (218 lb 11.2 oz)  SpO2 98%  BMI 30.5 kg/m2  Body mass index is 30.5 kg/(m^2).      PE:  Physical Exam   Constitutional: He appears well-developed. No distress.   HENT:   Head: Normocephalic and atraumatic.   Abdominal: Soft. He exhibits no distension.   Genitourinary:   Genitourinary Comments: Perianal exam: external hem - wnl  VIJAYA- good tone, no masses  Anoscopy performed:  wnl internal hem   Musculoskeletal: Normal range of motion.   Neurological: He is alert.   Psychiatric: Thought content normal.         Assessment:   1. Fecal smearing         Plan:    Discussion with patient as his anal sphincter tone is normal, this is not the cause of his fecal smearing.  First line treatment of FI is to bulk up stool consistency with fiber supplement.  I detailed and gave written instructions on how to achieve a high fiber diet.  Call or come in if no improvement with conservative measures.    He should continue colonoscopies per Dr. Ornelas    RTC prn      Scribed for Lesli Sy MD by Tg Meng PA-C 2/28/2018  This patient was evaluated by me, recommendations made, documentation reviewed, edited, and revised by me, Lesli Sy MD        "

## 2018-03-02 ENCOUNTER — PREP FOR SURGERY (OUTPATIENT)
Dept: OTHER | Facility: HOSPITAL | Age: 68
End: 2018-03-02

## 2018-03-02 DIAGNOSIS — Z86.010 HISTORY OF COLON POLYPS: Primary | ICD-10-CM

## 2018-03-19 RX ORDER — ATORVASTATIN CALCIUM 20 MG/1
20 TABLET, FILM COATED ORAL DAILY
Qty: 90 TABLET | Refills: 0 | Status: SHIPPED | OUTPATIENT
Start: 2018-03-19 | End: 2018-06-15 | Stop reason: SDUPTHER

## 2018-03-23 ENCOUNTER — TELEPHONE (OUTPATIENT)
Dept: CARDIOLOGY | Facility: CLINIC | Age: 68
End: 2018-03-23

## 2018-03-23 DIAGNOSIS — R94.39 ABNORMAL STRESS ECHO: Primary | ICD-10-CM

## 2018-03-23 RX ORDER — LISINOPRIL 20 MG/1
TABLET ORAL
Qty: 30 TABLET | Refills: 2 | Status: SHIPPED | OUTPATIENT
Start: 2018-03-23 | End: 2018-04-20 | Stop reason: SDUPTHER

## 2018-03-23 NOTE — TELEPHONE ENCOUNTER
Pt called back. He said now that he decided not to go through with the heart cath. He also wanted to know if it's ok to have the colonscopy with his condition?

## 2018-03-23 NOTE — TELEPHONE ENCOUNTER
Pt called and stated that he needs a clearance for his colonscopy that is scheduled for April. He wanted to make sure he was fine to do this. Also, he has decided to go ahead and do the heart cath. And would like to speak with about it. He also said he would like to choose the doctor to do it. He can be reached at [phone].

## 2018-04-02 ENCOUNTER — HOSPITAL ENCOUNTER (OUTPATIENT)
Facility: HOSPITAL | Age: 68
Setting detail: HOSPITAL OUTPATIENT SURGERY
Discharge: HOME | End: 2018-04-02
Attending: SURGERY | Admitting: SURGERY

## 2018-04-02 ENCOUNTER — ANESTHESIA EVENT (OUTPATIENT)
Dept: GASTROENTEROLOGY | Facility: HOSPITAL | Age: 68
End: 2018-04-02

## 2018-04-02 ENCOUNTER — ANESTHESIA (OUTPATIENT)
Dept: GASTROENTEROLOGY | Facility: HOSPITAL | Age: 68
End: 2018-04-02

## 2018-04-02 VITALS
SYSTOLIC BLOOD PRESSURE: 121 MMHG | DIASTOLIC BLOOD PRESSURE: 78 MMHG | BODY MASS INDEX: 29.27 KG/M2 | RESPIRATION RATE: 13 BRPM | HEIGHT: 72 IN | HEART RATE: 67 BPM | TEMPERATURE: 98 F | OXYGEN SATURATION: 97 % | WEIGHT: 216.1 LBS

## 2018-04-02 LAB — GLUCOSE BLDC GLUCOMTR-MCNC: 137 MG/DL (ref 70–130)

## 2018-04-02 PROCEDURE — 82962 GLUCOSE BLOOD TEST: CPT

## 2018-04-02 PROCEDURE — G0121 COLON CA SCRN NOT HI RSK IND: HCPCS | Performed by: SURGERY

## 2018-04-02 PROCEDURE — 25010000002 PROPOFOL 10 MG/ML EMULSION: Performed by: NURSE ANESTHETIST, CERTIFIED REGISTERED

## 2018-04-02 PROCEDURE — 25010000002 GLUCAGON (RDNA) PER 1 MG: Performed by: SURGERY

## 2018-04-02 RX ORDER — PROPOFOL 10 MG/ML
VIAL (ML) INTRAVENOUS AS NEEDED
Status: DISCONTINUED | OUTPATIENT
Start: 2018-04-02 | End: 2018-04-02 | Stop reason: SURG

## 2018-04-02 RX ORDER — PROPOFOL 10 MG/ML
VIAL (ML) INTRAVENOUS CONTINUOUS PRN
Status: DISCONTINUED | OUTPATIENT
Start: 2018-04-02 | End: 2018-04-02 | Stop reason: SURG

## 2018-04-02 RX ORDER — LIDOCAINE HYDROCHLORIDE 10 MG/ML
0.5 INJECTION, SOLUTION INFILTRATION; PERINEURAL ONCE AS NEEDED
Status: DISCONTINUED | OUTPATIENT
Start: 2018-04-02 | End: 2018-04-02 | Stop reason: HOSPADM

## 2018-04-02 RX ORDER — SODIUM CHLORIDE, SODIUM LACTATE, POTASSIUM CHLORIDE, CALCIUM CHLORIDE 600; 310; 30; 20 MG/100ML; MG/100ML; MG/100ML; MG/100ML
1000 INJECTION, SOLUTION INTRAVENOUS CONTINUOUS
Status: DISCONTINUED | OUTPATIENT
Start: 2018-04-02 | End: 2018-04-02 | Stop reason: HOSPADM

## 2018-04-02 RX ORDER — SODIUM CHLORIDE 0.9 % (FLUSH) 0.9 %
3 SYRINGE (ML) INJECTION AS NEEDED
Status: DISCONTINUED | OUTPATIENT
Start: 2018-04-02 | End: 2018-04-02 | Stop reason: HOSPADM

## 2018-04-02 RX ORDER — LIDOCAINE HYDROCHLORIDE 20 MG/ML
INJECTION, SOLUTION INFILTRATION; PERINEURAL AS NEEDED
Status: DISCONTINUED | OUTPATIENT
Start: 2018-04-02 | End: 2018-04-02 | Stop reason: SURG

## 2018-04-02 RX ADMIN — PROPOFOL 160 MCG/KG/MIN: 10 INJECTION, EMULSION INTRAVENOUS at 07:54

## 2018-04-02 RX ADMIN — PROPOFOL 100 MG: 10 INJECTION, EMULSION INTRAVENOUS at 07:54

## 2018-04-02 RX ADMIN — LIDOCAINE HYDROCHLORIDE 80 MG: 20 INJECTION, SOLUTION INFILTRATION; PERINEURAL at 07:52

## 2018-04-02 RX ADMIN — SODIUM CHLORIDE, POTASSIUM CHLORIDE, SODIUM LACTATE AND CALCIUM CHLORIDE 1000 ML: 600; 310; 30; 20 INJECTION, SOLUTION INTRAVENOUS at 06:56

## 2018-04-02 NOTE — H&P
Cc: Endoscopy Visit    HPI: 67 y.o. male here for screening with uncle with colon cancer and ostomy and patient with history of polyps remotely.    Past Medical History:   Diagnosis Date   • Arthritis    • Asthma    • BPH (benign prostatic hyperplasia)    • Carotid artery disease     RIGHT   • Colon polyp    • COPD (chronic obstructive pulmonary disease)    • Depression    • Diabetes mellitus 2002    type II, pt reports A1C of 7.1 in 3/2016   • Elbow pain, right    • Gout    • Heart murmur    • Hemorrhoids    • High cholesterol    • History of hepatitis C     In remission after Interferon and Ribavirin treatment   • HSV-2 infection    • Hypertension    • PAULETTE (obstructive sleep apnea) 2005    USES BIPAP, 2018 resolved, no longer uses cpap   • Plantar fasciitis    • Substance abuse     H/O DRUG ABUSE, NOW SOBER( IV DRUG USE)   • Substance abuse 1987    H/O ALCOHOL ABUSE   • Vitamin B 12 deficiency    • Vitamin D deficiency        Past Surgical History:   Procedure Laterality Date   • COLONOSCOPY N/A 09/10/2013    Normal, repeat in 10 years-Dr. Svetlana Ornelas   • COLONOSCOPY N/A 11/17/2006    Normal, repeat in 5 years-Dr. Jackson Denise   • COLONOSCOPY N/A 08/25/2009    Normal, repeat in 5 years-Dr. Jackson Denise   • CYST REMOVAL N/A 06/22/2015    ON FOREHEAD, EPIDERMOID/SEBACEOUS, DR. SAHIL PFEIFFER   • CYSTOSCOPY     • SHOULDER SURGERY Bilateral 1995    BONE SPURS, DR. TYRA HESS   • TENNIS ELBOW RELEASE Right 7/18/2017    Procedure: RIGHT ELBOW OPEN FLEXOR TENDON DEBRIDEMENT AND REPAIR;  Surgeon: FABIO Hess MD;  Location: [facility] OR [facility] [place];  Service:        has No Known Allergies.       Medication List      CONTINUE taking these medications    ACCU-CHEK FASTCLIX LANCETS misc     BD PEN NEEDLE SANDRA U/F 32G X 4 MM misc  Generic drug:  Insulin Pen Needle        ASK your doctor about these medications    ACCU-CHEK SMARTVIEW test strip  Generic drug:  glucose blood     ASPIRIN ADULT LOW STRENGTH 81 MG EC tablet  Generic  drug:  aspirin     atorvastatin 20 MG tablet  Commonly known as:  LIPITOR  TAKE 1 TABLET BY MOUTH DAILY     benzonatate 200 MG capsule  Commonly known as:  TESSALON     cholecalciferol 1000 units tablet  Commonly known as:  VITAMIN D3     fish oil 1000 MG capsule capsule     fluticasone 50 MCG/ACT nasal spray  Commonly known as:  FLONASE     glimepiride 4 MG tablet  Commonly known as:  AMARYL     JENTADUETO 2.5-1000 MG tablet  Generic drug:  Linagliptin-Metformin HCl     LANTUS SOLOSTAR 100 UNIT/ML injection pen  Generic drug:  Insulin Glargine     lisinopril 20 MG tablet  Commonly known as:  PRINIVIL,ZESTRIL  TAKE 1 TABLET BY MOUTH EVERY DAY     nebivolol 10 MG tablet  Commonly known as:  BYSTOLIC  Take 1 tablet by mouth Daily.     predniSONE 10 MG tablet  Commonly known as:  DELTASONE     SYMBICORT 160-4.5 MCG/ACT inhaler  Generic drug:  budesonide-formoterol     tamsulosin 0.4 MG capsule 24 hr capsule  Commonly known as:  FLOMAX     VITAMIN B-12 PO          Family History   Problem Relation Age of Onset   • Heart disease Father    • Hypertension Father    • Alcohol abuse Father    • Colon cancer Paternal Uncle      diagnosed in 60s   • Hypertension Paternal Uncle    • Heart disease Paternal Uncle    • Diabetes Paternal Uncle    • Diabetes Sister    • Hyperlipidemia Sister    • Hypertension Sister    • Kidney disease Sister    • Lung disease Mother    • COPD Mother    • Cancer Paternal Grandmother    • Malig Hyperthermia Neg Hx        Social History     Social History   • Marital status:      Spouse name: Lucy Cox   • Number of children: N/A   • Years of education: N/A     Occupational History   • 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    Physical Exam    General: No acute distress  Lungs: No labored breathing, Pulse oximetry on room air is 96%.  Heart: RRR  Abdo: Soft  Mental:  Awake, alert, and oriented    Imp:     · Screening  · New info of uncle with colon cancer  · Reported remote history of colon polyps, c scope 2006 and 2009 and 2013 negative.     Plan:  · c scope  · Will be a candidate for cologuard next time if negative today.    Svetlana Ornelas MD  7:50 AM

## 2018-04-02 NOTE — ANESTHESIA POSTPROCEDURE EVALUATION
"Patient: Mike Cox    Procedure Summary     Date:  04/02/18 Room / Location:   [facility]    Anesthesia Start:  0748 Anesthesia Stop:  0812    Procedure:  COLONOSCOPY into cecum and ti (N/A ) Diagnosis:       History of colon polyps      (History of colon polyps [Z86.010])    Surgeon:  Svetlana Ornelas MD Provider:  Vandana Coronado MD    Anesthesia Type:  MAC ASA Status:  3          Anesthesia Type: MAC  Last vitals  BP   121/78 (04/02/18 0834)   Temp   36.7 °C (98 °F) (04/02/18 0827)   Pulse   67 (04/02/18 0834)   Resp   13 (04/02/18 0834)     SpO2   97 % (04/02/18 0834)     Post Anesthesia Care and Evaluation    Patient location during evaluation: PHASE II    Comments: Blood pressure 121/78, pulse 67, temperature 36.7 °C (98 °F), resp. rate 13, height 182.9 cm (72\"), weight 98 kg (216 lb 1.6 oz), SpO2 97 %.    No anesthesia care post op    "

## 2018-04-02 NOTE — ANESTHESIA PREPROCEDURE EVALUATION
Anesthesia Evaluation     Patient summary reviewed and Nursing notes reviewed   NPO Solid Status: > 8 hours  NPO Liquid Status: > 8 hours           Airway   Mallampati: I  TM distance: >3 FB  Neck ROM: full  No difficulty expected  Dental      Comment: Extensive restorations    Pulmonary     breath sounds clear to auscultation  (+) COPD (pt says it is resolved), sleep apnea (on no Rx nnow),   Cardiovascular - normal exam    (+) hypertension (two meds),       Neuro/Psych  (+) psychiatric history Depression,     GI/Hepatic/Renal/Endo    (+)   diabetes mellitus type 2 well controlled using insulin,     Musculoskeletal     Abdominal  - normal exam   Substance History      OB/GYN          Other                        Anesthesia Plan    ASA 3     MAC     Anesthetic plan and risks discussed with patient and spouse/significant other.

## 2018-04-02 NOTE — OP NOTE
Colonoscopy Procedure Note  Mike Cox  1950  Date of Procedure: 04/02/18    Pre-operative Diagnosis:    · Screening  · New info of uncle with colon cancer  · Reported remote history of colon polyps, c scope 2006 and 2009 and 2013 negative.    Post-operative Diagnosis:  · Diverticulosis, mild    Procedure: Colonoscopy with terminal ileoscopy     Findings/Treatments:   · Sigmoid diverticulosis, mild       Recommendations:   · C scope in 10 years.  As an alternative, patient is a candidate for cologuard.  He does have a fairly straightforward c scope, thus not putting him at high risk from a technical standpoint, and thus if he prefers the c scope, it would be suitable.  · Copy of photographs to be given from today's procedure prior to discharge.    Surgeon: Ceci    Anesthetic: MAC per Vandana Coronado MD    Indications:  As above    Scope Withdrawal Time:  8 minutes  6 seconds    Procedure Details     MAC anesthesia was induced.  The 180 Colonoscopy was inserted blindly into the rectum and advanced to the cecum, with relative ease,  without need for pressure, lift, or turning.  Cecum was identified by ileocecal valve and appendiceal orifice and photographed for documentation.  Terminal ileum was intubated and was normal.    Prep quality was excelletn.  A careful inspection was made as the colonoscope was withdrawn, including a retroflexed view of the rectum; there was no suggestion of presence of angiodysplasias, colitis, or polyps but with a few sigmoid diverticula, with no interventions.     Retroflexion in the rectum revealed no abnormalities.    Svetlana Ornelas MD  04/02/18  8:11 AM

## 2018-04-16 ENCOUNTER — TELEPHONE (OUTPATIENT)
Dept: FAMILY MEDICINE CLINIC | Facility: CLINIC | Age: 68
End: 2018-04-16

## 2018-04-16 ENCOUNTER — HOSPITAL ENCOUNTER (EMERGENCY)
Facility: HOSPITAL | Age: 68
Discharge: HOME | End: 2018-04-16
Attending: EMERGENCY MEDICINE | Admitting: EMERGENCY MEDICINE

## 2018-04-16 VITALS
HEART RATE: 89 BPM | HEIGHT: 72 IN | DIASTOLIC BLOOD PRESSURE: 87 MMHG | WEIGHT: 215 LBS | TEMPERATURE: 97.2 F | SYSTOLIC BLOOD PRESSURE: 147 MMHG | RESPIRATION RATE: 16 BRPM | BODY MASS INDEX: 29.12 KG/M2 | OXYGEN SATURATION: 97 %

## 2018-04-16 DIAGNOSIS — B96.89 SUPERFICIAL BACTERIAL INFECTION OF SKIN: ICD-10-CM

## 2018-04-16 DIAGNOSIS — L08.9 SUPERFICIAL BACTERIAL INFECTION OF SKIN: ICD-10-CM

## 2018-04-16 DIAGNOSIS — L92.3 TATTOO REACTION: Primary | ICD-10-CM

## 2018-04-16 LAB
ALBUMIN SERPL-MCNC: 4.5 G/DL (ref 3.5–5.2)
ALBUMIN/GLOB SERPL: 1.5 G/DL
ALP SERPL-CCNC: 89 U/L (ref 39–117)
ALT SERPL W P-5'-P-CCNC: 22 U/L (ref 1–41)
ANION GAP SERPL CALCULATED.3IONS-SCNC: 13.4 MMOL/L
AST SERPL-CCNC: 18 U/L (ref 1–40)
BASOPHILS # BLD AUTO: 0.05 10*3/MM3 (ref 0–0.2)
BASOPHILS NFR BLD AUTO: 0.5 % (ref 0–1.5)
BILIRUB SERPL-MCNC: 0.8 MG/DL (ref 0.1–1.2)
BUN BLD-MCNC: 11 MG/DL (ref 8–23)
BUN/CREAT SERPL: 12 (ref 7–25)
CALCIUM SPEC-SCNC: 9.8 MG/DL (ref 8.6–10.5)
CHLORIDE SERPL-SCNC: 96 MMOL/L (ref 98–107)
CO2 SERPL-SCNC: 28.6 MMOL/L (ref 22–29)
CREAT BLD-MCNC: 0.92 MG/DL (ref 0.76–1.27)
DEPRECATED RDW RBC AUTO: 42.7 FL (ref 37–54)
EOSINOPHIL # BLD AUTO: 0.2 10*3/MM3 (ref 0–0.7)
EOSINOPHIL NFR BLD AUTO: 1.8 % (ref 0.3–6.2)
ERYTHROCYTE [DISTWIDTH] IN BLOOD BY AUTOMATED COUNT: 13.2 % (ref 11.5–14.5)
GFR SERPL CREATININE-BSD FRML MDRD: 82 ML/MIN/1.73
GLOBULIN UR ELPH-MCNC: 3.1 GM/DL
GLUCOSE BLD-MCNC: 172 MG/DL (ref 65–99)
HCT VFR BLD AUTO: 48.3 % (ref 40.4–52.2)
HGB BLD-MCNC: 16.3 G/DL (ref 13.7–17.6)
IMM GRANULOCYTES # BLD: 0.04 10*3/MM3 (ref 0–0.03)
IMM GRANULOCYTES NFR BLD: 0.4 % (ref 0–0.5)
LYMPHOCYTES # BLD AUTO: 2.21 10*3/MM3 (ref 0.9–4.8)
LYMPHOCYTES NFR BLD AUTO: 20.3 % (ref 19.6–45.3)
MCH RBC QN AUTO: 30.1 PG (ref 27–32.7)
MCHC RBC AUTO-ENTMCNC: 33.7 G/DL (ref 32.6–36.4)
MCV RBC AUTO: 89.3 FL (ref 79.8–96.2)
MONOCYTES # BLD AUTO: 1.12 10*3/MM3 (ref 0.2–1.2)
MONOCYTES NFR BLD AUTO: 10.3 % (ref 5–12)
NEUTROPHILS # BLD AUTO: 7.28 10*3/MM3 (ref 1.9–8.1)
NEUTROPHILS NFR BLD AUTO: 66.7 % (ref 42.7–76)
NRBC BLD MANUAL-RTO: 0 /100 WBC (ref 0–0)
PLATELET # BLD AUTO: 257 10*3/MM3 (ref 140–500)
PMV BLD AUTO: 10.5 FL (ref 6–12)
POTASSIUM BLD-SCNC: 5.1 MMOL/L (ref 3.5–5.2)
PROT SERPL-MCNC: 7.6 G/DL (ref 6–8.5)
RBC # BLD AUTO: 5.41 10*6/MM3 (ref 4.6–6)
SODIUM BLD-SCNC: 138 MMOL/L (ref 136–145)
WBC NRBC COR # BLD: 10.9 10*3/MM3 (ref 4.5–10.7)

## 2018-04-16 PROCEDURE — 36415 COLL VENOUS BLD VENIPUNCTURE: CPT | Performed by: PHYSICIAN ASSISTANT

## 2018-04-16 PROCEDURE — 99283 EMERGENCY DEPT VISIT LOW MDM: CPT

## 2018-04-16 PROCEDURE — 80053 COMPREHEN METABOLIC PANEL: CPT | Performed by: PHYSICIAN ASSISTANT

## 2018-04-16 PROCEDURE — 85025 COMPLETE CBC W/AUTO DIFF WBC: CPT | Performed by: PHYSICIAN ASSISTANT

## 2018-04-16 RX ORDER — MUPIROCIN CALCIUM 20 MG/G
CREAM TOPICAL ONCE
Status: DISCONTINUED | OUTPATIENT
Start: 2018-04-16 | End: 2018-04-16 | Stop reason: HOSPADM

## 2018-04-16 RX ORDER — MUPIROCIN CALCIUM 20 MG/G
CREAM TOPICAL 3 TIMES DAILY
Qty: 30 G | Refills: 0 | Status: SHIPPED | OUTPATIENT
Start: 2018-04-16 | End: 2018-04-23

## 2018-04-16 NOTE — ED PROVIDER NOTES
EMERGENCY DEPARTMENT ENCOUNTER    CHIEF COMPLAINT  Chief Complaint: skin infection  History given by: pt  History limited by: nothing  Room Number: 52/52  PMD: Allyson Salas MD      HPI:  Pt is a 67 y.o. male who presents for a recheck of skin infection. Pt has a new tattoo R upper arm with swelling and tenderness with eschar with drainage to medial aspect of R upper arm. Yesterday he reports he was seen at [facility] for evaluation and he received a shot (rocephin) and was given an Rx for Keflex and Bactrim, which he has been taking. He was told to come to the ER if the wound worsened- pt states the pain is somewhat improved, but he has painless swelling to the dependent portion of his prox R upper arm and forearm that he feels is more prominent than yesterday. He reports the tattoo was initially done 3 days ago. He denies fever, myalgias, nausea/vomiting, CP, SOA.   Pt is on 81 mg ASA.    Duration:  4 days  Onset: gradual  Timing: constant  Location: RUE  Radiation: none  Quality: swelling  Intensity/Severity: mild  Progression: worsening  Associated Symptoms: none  Aggravating Factors: none  Alleviating Factors: none  Previous Episodes: No previous episodes.  Treatment before arrival: Pt has taken Rocephin IM x 1 and Keflex/Bactrim since yesterday. He also uses [place] gel and Tattoo goo.    PAST MEDICAL HISTORY  Active Ambulatory Problems     Diagnosis Date Noted   • Diastasis recti 07/06/2016   • History of colon polyps 07/06/2016   • Breast pain 08/15/2016   • Acute bronchitis 08/15/2016   • Elevated serum alkaline phosphatase level 08/15/2016   • Arteriosclerotic vascular disease 08/15/2016   • Cellulitis of trunk 08/15/2016   • Chronic obstructive pulmonary disease 08/15/2016   • Depression 08/15/2016   • Fatigue 08/15/2016   • Genital herpes simplex 08/15/2016   • Gout 08/15/2016   • Calcaneal spur 08/15/2016   • Hyperlipidemia 08/15/2016   • Hypertension 08/15/2016   • Infected sebaceous cyst  08/15/2016   • Medial epicondylitis of elbow 08/15/2016   • Coccyx sprain 08/15/2016   • Type 2 diabetes mellitus 08/15/2016   • Cobalamin deficiency 08/15/2016   • Medicare annual wellness visit, subsequent 09/13/2016   • Elevated LFTs 04/22/2017   • Internal hemorrhoids 08/29/2017   • Umbilical hernia without obstruction and without gangrene 10/25/2017     Resolved Ambulatory Problems     Diagnosis Date Noted   • Family history of colon cancer 07/06/2016   • Hepatitis C virus infection 08/15/2016   • Obstructive sleep apnea, adult 08/15/2016     Past Medical History:   Diagnosis Date   • Arthritis    • Asthma    • BPH (benign prostatic hyperplasia)    • Carotid artery disease    • Colon polyp    • COPD (chronic obstructive pulmonary disease)    • Depression    • Diabetes mellitus 2002   • Elbow pain, right    • Gout    • Heart murmur    • Hemorrhoids    • High cholesterol    • History of hepatitis C    • HSV-2 infection    • Hypertension    • PAULETTE (obstructive sleep apnea) 2005   • Plantar fasciitis    • Substance abuse    • Substance abuse 1987   • Vitamin B 12 deficiency    • Vitamin D deficiency        PAST SURGICAL HISTORY  Past Surgical History:   Procedure Laterality Date   • COLONOSCOPY N/A 09/10/2013    Normal, repeat in 10 years-Dr. Svetlana Ornelas   • COLONOSCOPY N/A 11/17/2006    Normal, repeat in 5 years-Dr. Jackson Denise   • COLONOSCOPY N/A 08/25/2009    Normal, repeat in 5 years-Dr. Jackson Denise   • COLONOSCOPY N/A 4/2/2018    Procedure: COLONOSCOPY into cecum and ti;  Surgeon: Svetlana Ornelas MD;  Location: [facility] ENDOSCOPY;  Service: Gastroenterology   • CYST REMOVAL N/A 06/22/2015    ON FOREHEAD, EPIDERMOID/SEBACEOUS, DR. SAHIL PFEIFFER   • CYSTOSCOPY     • SHOULDER SURGERY Bilateral 1995    BONE SPURS, DR. TYRA HESS   • TENNIS ELBOW RELEASE Right 7/18/2017    Procedure: RIGHT ELBOW OPEN FLEXOR TENDON DEBRIDEMENT AND REPAIR;  Surgeon: FABIO Hess MD;  [place] [facility] [place] [facility] [place]  Service:         FAMILY HISTORY  Family History   Problem Relation Age of Onset   • Heart disease Father    • Hypertension Father    • Alcohol abuse Father    • Colon cancer Paternal Uncle      diagnosed in 60s   • Hypertension Paternal Uncle    • Heart disease Paternal Uncle    • Diabetes Paternal Uncle    • Diabetes Sister    • Hyperlipidemia Sister    • Hypertension Sister    • Kidney disease Sister    • Lung disease Mother    • COPD Mother    • Cancer Paternal Grandmother    • Malig Hyperthermia Neg Hx        SOCIAL HISTORY  Social History     Social History   • Marital status:      Spouse name: Lucy Cox   • Number of children: N/A   • Years of education: N/A     Occupational History   • Psychotherapist/ Other     [employer]     Social History Main Topics   • Smoking status: Former Smoker     Packs/day: 1.00     Years: 20.00     Types: Cigarettes     Start date: 1966     Quit date: 1986   • Smokeless tobacco: Never Used      Comment: Caffeine use 3 cups daily   • Alcohol use No      Comment: sober for 32 years   • Drug use: No   • Sexual activity: Defer     Other Topics Concern   • Not on file     Social History Narrative   • No narrative on file       ALLERGIES  Review of patient's allergies indicates no known allergies.    REVIEW OF SYSTEMS  Review of Systems   Constitutional: Negative for chills and fever.   HENT: Negative for sore throat and trouble swallowing.    Eyes: Negative for visual disturbance.   Respiratory: Negative for cough and shortness of breath.    Cardiovascular: Negative for chest pain and leg swelling.   Gastrointestinal: Negative for abdominal pain, diarrhea and vomiting.   Endocrine: Negative.    Genitourinary: Negative for decreased urine volume and frequency.   Musculoskeletal: Negative for neck pain.   Skin: Positive for color change (RUE) and wound (RUE). Negative for rash.   Allergic/Immunologic: Negative.    Neurological: Negative for weakness and numbness.    Hematological: Negative.    Psychiatric/Behavioral: Negative.    All other systems reviewed and are negative.      PHYSICAL EXAM  ED Triage Vitals   Temp Heart Rate Resp BP SpO2   04/16/18 1242 04/16/18 1242 04/16/18 1246 04/16/18 1246 04/16/18 1242   97.2 °F (36.2 °C) 89 16 147/87 97 %      Temp src Heart Rate Source Patient Position BP Location FiO2 (%)   -- -- 04/16/18 1246 04/16/18 1246 --     Sitting Left arm        Physical Exam   Constitutional: He is oriented to person, place, and time and well-developed, well-nourished, and in no distress. He appears healthy.  Non-toxic appearance. He does not have a sickly appearance. No distress.   HENT:   Head: Normocephalic and atraumatic.   Eyes: EOM are normal. Pupils are equal, round, and reactive to light.   Neck: Normal range of motion. Neck supple.   Cardiovascular: Normal rate, regular rhythm and normal heart sounds.    No murmur heard.  Pulses:       Posterior tibial pulses are 2+ on the right side, and 2+ on the left side.   Pulmonary/Chest: Effort normal and breath sounds normal. No respiratory distress. He has no wheezes.   Abdominal: Soft. Bowel sounds are normal. There is no tenderness. There is no rebound and no guarding.   Musculoskeletal: Normal range of motion.        Right upper arm: He exhibits swelling (mild) and edema (mild).   Neurological: He is alert and oriented to person, place, and time. He has normal sensation and normal strength.   Skin: Skin is warm and dry.   RUE- multiple multicolored tattoos, pt reports new from elbow to shoulder, eschar in scattered areas to medial upper arm and areas with moist eschar over medial aspect of distal upper arm, minimal warmth, no induration, mild edema to medial aspect of upper arm and proximal forearm, no obvious erythema, no abscess.  Axilla no TTPalp/adenopathy.  ROM, pulse, sens intact   Psychiatric: Mood and affect normal.   Nursing note and vitals reviewed.      LAB RESULTS  Lab Results (last 24  hours)     Procedure Component Value Units Date/Time    CBC & Differential [[id number]] Collected:  04/16/18 1318    Specimen:  Blood Updated:  04/16/18 1348    Narrative:       The following orders were created for panel order CBC & Differential.  Procedure                               Abnormality         Status                     ---------                               -----------         ------                     CBC Auto Differential[[id number]]        Abnormal            Final result                 Please view results for these tests on the individual orders.    Comprehensive Metabolic Panel [[id number]]  (Abnormal) Collected:  04/16/18 1318    Specimen:  Blood Updated:  04/16/18 1404     Glucose 172 (H) mg/dL      BUN 11 mg/dL      Creatinine 0.92 mg/dL      Sodium 138 mmol/L      Potassium 5.1 mmol/L      Chloride 96 (L) mmol/L      CO2 28.6 mmol/L      Calcium 9.8 mg/dL      Total Protein 7.6 g/dL      Albumin 4.50 g/dL      ALT (SGPT) 22 U/L      AST (SGOT) 18 U/L      Alkaline Phosphatase 89 U/L      Total Bilirubin 0.8 mg/dL      eGFR Non African Amer 82 mL/min/1.73      Globulin 3.1 gm/dL      A/G Ratio 1.5 g/dL      BUN/Creatinine Ratio 12.0     Anion Gap 13.4 mmol/L     CBC Auto Differential [[id number]]  (Abnormal) Collected:  04/16/18 1318    Specimen:  Blood Updated:  04/16/18 1348     WBC 10.90 (H) 10*3/mm3      RBC 5.41 10*6/mm3      Hemoglobin 16.3 g/dL      Hematocrit 48.3 %      MCV 89.3 fL      MCH 30.1 pg      MCHC 33.7 g/dL      RDW 13.2 %      RDW-SD 42.7 fl      MPV 10.5 fL      Platelets 257 10*3/mm3      Neutrophil % 66.7 %      Lymphocyte % 20.3 %      Monocyte % 10.3 %      Eosinophil % 1.8 %      Basophil % 0.5 %      Immature Grans % 0.4 %      Neutrophils, Absolute 7.28 10*3/mm3      Lymphocytes, Absolute 2.21 10*3/mm3      Monocytes, Absolute 1.12 10*3/mm3      Eosinophils, Absolute 0.20 10*3/mm3      Basophils, Absolute 0.05 10*3/mm3      Immature Grans, Absolute 0.04 (H) 10*3/mm3       nRBC 0.0 /100 WBC           I ordered the above labs and reviewed the results      PROCEDURES  Procedures      PROGRESS AND CONSULTS  ED Course     1427- Initial pt consult. I advised the pt that we will review his blood work when it all returns. I advised pt that he is 2 abx that should cover most of the infections that he may have received. I endorsed that I will speak with the pharmacist to discuss any additional topical creams that pt could add to help his infection.    1448- Had pharmacist consult the pt along with me. She reports that Bactroban cream is petroleum free.  I will provide him with an Rx for this. I encouraged pt to cover the areas that are oozing fluid. I encouraged him to continue to wash his tattoo with clean hands and antibacterial soap. Pt should f/u with PCP in 2 days for recheck and he should take a Probiotic while he is on abx. Will provide pt with wound care prior to discharge. Pt understands and agrees with the plan and to return to the ED with increasing pain, fever, swelling, pain to axilla, nausea/vomiting or other concerns, all questions answered.    MEDICAL DECISION MAKING  Results were reviewed/discussed with the patient and they were also made aware of online access. Pt also made aware that some labs, such as cultures, will not be resulted during ER visit and follow up with PMD is necessary.     MDM  Number of Diagnoses or Management Options  Tattoo reaction:      Amount and/or Complexity of Data Reviewed  Clinical lab tests: reviewed and ordered (WBC-10.9)  Discuss the patient with other providers: yes (pharmacist)           DIAGNOSIS  Final diagnoses:   Tattoo reaction   Superficial bacterial infection of skin       DISPOSITION  DISCHARGE    Patient discharged in stable condition.    Reviewed implications of results, diagnosis, meds, responsibility to follow up, warning signs and symptoms of possible worsening, potential complications and reasons to return to ER, including  fever or firm pus like discharge.    Patient/Family voiced understanding of above instructions.    Discussed plan for discharge, as there is no emergent indication for admission. Patient referred to primary care provider for BP management due to today's BP. Pt/family is agreeable and understands need for follow up and repeat testing.  Pt is aware that discharge does not mean that nothing is wrong but it indicates no emergency is present that requires admission and they must continue care with follow-up as given below or physician of their choice.     FOLLOW-UP  Allyson Salas MD  [address]  [place] [id number] [phone]    Schedule an appointment as soon as possible for a visit in 2 days  EVEN IF WELL         Medication List      New Prescriptions    mupirocin 2 % cream  Commonly known as:  BACTROBAN  Apply  topically 3 (Three) Times a Day.              Latest Documented Vital Signs:  As of 3:13 PM  BP- 147/87 HR- 89 Temp- 97.2 °F (36.2 °C) O2 sat- 97%    --  Documentation assistance provided by hood Hess for Dr. De La Cruz.  Information recorded by the scribe was done at my direction and has been verified and validated by me.            Javier Hess  04/16/18 9140       Katie De La Cruz MD  04/18/18 7576

## 2018-04-16 NOTE — TELEPHONE ENCOUNTER
Patient was in ER and UC for infection in arm, relating to tattoo.   The ER advised him to follow up with PCP. We are booked Wednesday when he is wanting to come in.     Did not know if you wanted me to double book the last appt that is a CPE on Wednesday or have him see NP

## 2018-04-16 NOTE — ED NOTES
Pt states he got a new tattoo 4 days ago on right arm. Seen yesterday in Immediate Care for swelling, discharge and redness to area. ICC MD told pt to come to ED if symptoms got worse. Increase swelling and drainage today.     Mild swelling, redness and brown colored drainage noted.      Deepti Santiago RN  04/16/18 2347       Deepti Santiago RN  04/16/18 4478

## 2018-04-16 NOTE — DISCHARGE INSTRUCTIONS
You are advised to follow closely with her Maritza in 2-3 days for recheck, final results of lab work and imaging testing, and further testing/treatment as needed.    Continue Keflex and Bactrim.  Wound care once daily.  Use Bactroban 3 times daily applied to the open wounds with dressing change.  Take probiotics during and after antibiotic use    Please return to the emergency department immediately with chest pain different than usual for you, shortness of air, abdominal pain, vomiting/fever, blood in emesis or stool, lightheadedness/fainting, problems with speech, one sided weakness/numbness, new incontinence, problems with vision, increased swelling/redness/pain/drainage, or for worsening of symptoms or other concerns.

## 2018-04-19 ENCOUNTER — TELEPHONE (OUTPATIENT)
Dept: SOCIAL WORK | Facility: HOSPITAL | Age: 68
End: 2018-04-19

## 2018-04-20 ENCOUNTER — OFFICE VISIT (OUTPATIENT)
Dept: FAMILY MEDICINE CLINIC | Facility: CLINIC | Age: 68
End: 2018-04-20

## 2018-04-20 VITALS
TEMPERATURE: 97.6 F | HEART RATE: 68 BPM | DIASTOLIC BLOOD PRESSURE: 82 MMHG | BODY MASS INDEX: 29.76 KG/M2 | SYSTOLIC BLOOD PRESSURE: 142 MMHG | WEIGHT: 219.4 LBS | OXYGEN SATURATION: 98 %

## 2018-04-20 DIAGNOSIS — L03.113 CELLULITIS OF RIGHT UPPER EXTREMITY: Primary | ICD-10-CM

## 2018-04-20 PROCEDURE — 99213 OFFICE O/P EST LOW 20 MIN: CPT | Performed by: NURSE PRACTITIONER

## 2018-04-20 RX ORDER — LISINOPRIL 20 MG/1
TABLET ORAL
Qty: 90 TABLET | Refills: 2 | Status: SHIPPED | OUTPATIENT
Start: 2018-04-20 | End: 2019-02-15 | Stop reason: SDUPTHER

## 2018-04-20 RX ORDER — AMOXICILLIN AND CLAVULANATE POTASSIUM 875; 125 MG/1; MG/1
1 TABLET, FILM COATED ORAL 2 TIMES DAILY
Qty: 14 TABLET | Refills: 0 | Status: SHIPPED | OUTPATIENT
Start: 2018-04-20 | End: 2018-04-27

## 2018-04-20 NOTE — PROGRESS NOTES
Pleasant woman here today, received a tattoo Friday, unfortunately he developed redness swelling tenderness, symptoms of bacterial  Cellulitis  Was treated in the emergency room Saturday, treated for cellulitis 1 g Rocephin and discharged with Bactrim  Ultimately culture was negative    He reports 60% improvement  No fever no chills no increasing pain or swelling        Physical exam  Quite a bit of skin peeling related to his tattoo patient says is normal  Proximal to the right before meals space mild tenderness and minimal swelling  Area proximally 3×4 cm  No pustules no abscess normal range of motion      Plan discontinue Bactrim  Augmentin 875 twice a day ×7 days  We should make sure he is 100% clear if his cellulitis  As he is high risk for diabetes    Any increasing pain redness swelling fever emergency room  Probiotics

## 2018-04-20 NOTE — PATIENT INSTRUCTIONS
Discharge instructions  May stop the last day of Bactrim and cephalexin    Augmentin 875 twice a day ×7 days  Probiotic  Increased pain redness swelling emergency room    Routine follow-up cardiology endocrinology  And Dr. Salas    Thank You,      James Epley,  NP

## 2018-04-23 ENCOUNTER — OFFICE VISIT (OUTPATIENT)
Dept: CARDIOLOGY | Facility: CLINIC | Age: 68
End: 2018-04-23

## 2018-04-23 ENCOUNTER — EPISODE CHANGES (OUTPATIENT)
Dept: CASE MANAGEMENT | Facility: OTHER | Age: 68
End: 2018-04-23

## 2018-04-23 VITALS
HEART RATE: 82 BPM | WEIGHT: 220 LBS | HEIGHT: 72 IN | SYSTOLIC BLOOD PRESSURE: 122 MMHG | BODY MASS INDEX: 29.8 KG/M2 | DIASTOLIC BLOOD PRESSURE: 80 MMHG

## 2018-04-23 DIAGNOSIS — R06.09 DYSPNEA ON EXERTION: ICD-10-CM

## 2018-04-23 DIAGNOSIS — R94.39 ABNORMAL STRESS ECHO: ICD-10-CM

## 2018-04-23 DIAGNOSIS — I10 ESSENTIAL HYPERTENSION: ICD-10-CM

## 2018-04-23 DIAGNOSIS — I70.90 ARTERIOSCLEROTIC VASCULAR DISEASE: Primary | ICD-10-CM

## 2018-04-23 DIAGNOSIS — E11.9 TYPE 2 DIABETES MELLITUS WITHOUT COMPLICATION, WITHOUT LONG-TERM CURRENT USE OF INSULIN (HCC): ICD-10-CM

## 2018-04-23 DIAGNOSIS — E78.5 HYPERLIPIDEMIA, UNSPECIFIED HYPERLIPIDEMIA TYPE: ICD-10-CM

## 2018-04-23 DIAGNOSIS — J44.9 CHRONIC OBSTRUCTIVE PULMONARY DISEASE, UNSPECIFIED COPD TYPE (HCC): ICD-10-CM

## 2018-04-23 PROCEDURE — 99215 OFFICE O/P EST HI 40 MIN: CPT | Performed by: INTERNAL MEDICINE

## 2018-04-23 PROCEDURE — 93000 ELECTROCARDIOGRAM COMPLETE: CPT | Performed by: INTERNAL MEDICINE

## 2018-04-23 NOTE — PROGRESS NOTES
Date of Office Visit: 2018  Encounter Provider: Isabelle Flores MD  Place of Service: [facility]ARDIOLOG[facility]  Patient Name: Mike Cox  :1950      Patient ID:  Mike Cox is a 67 y.o. male is here for  followup for labile BP.         History of Present Illness    He has hypertension, hyperlipidemia, diabetes mellitus type 2, and obstructive sleep  apnea. He first presented to the office for chest pain. Because of his cardiovascular  risks, he had an exercise nuclear stress study done in  which was negative for  ischemia. He had a lot of fatigue at that time and palpitations which were treated and  got better. His Holter recording done in 2008 showed premature ventricular complexes.   He had vascular screening done on 2009 which was normal. He had a cardiac duplex   study performed on 2011 which showed moderate intimal thickening of the right   common carotid artery with mild thickening of that carotid bulb but no thickening of his stenosis.   He presented in 2008 with fatigue and had a stress nuclear perfusion study showing no evidence   of ischemia. His echocardiogram then showed an ejection fraction of 65% with grade I diastolic dysfunction  and no significant valvular heart problems.       His last echocardiogram done 2011, showed an ejection fraction of 54% with grade 1  diastolic dysfunction and no significant valvular heart problems. He had a stress nuclear 2012  at [facility] and had no ischemia.      He sees Dr. Ireland from endocrinology for his lipid management. He did see Dr. Sampson for   PAULETTE and COPD.          He had some mild dyspnea with exertion at his last visit and called and said it  was getting even worse, so he had a stress nuclear perfusion study done on 2015,  showing no evidence of ischemia and ejection fraction of 57%. He had a 2-D echocardiogram  with Doppler done on 2014, showing an ejection  fraction of 54% and grade 1 diastolic  dysfunction. He had carotid duplex studies done on 04/28/2015, which showed moderate  intimal thickening of the right internal carotid artery and moderate plaque in the left  internal carotid artery.       He had vascular screening done on 03/20/2016, which showed plaquing in both carotid bulbs but it was otherwise unremarkable.        The patient had vascular screening done 06/06/2017, which was normal. He had normal vascular screening done 1/2018.       He had a stress echocardiogram done 2/1/18 showing baseline ejection fraction 59% with postexercise ejection fraction of 68% with septal hypokinesis which may be secondary to bundle branch block.  There was noted to be grade 1 diastolic dysfunction and no valvular disease. This was done for dyspnea on exertion.     He continues had dyspnea on exertion and he's noticing increasing fatigue with activity.  He has occasional fleeting chest tightness which doesn't sound anginal.  He doesn't feels heart racing or skipping.  He's had no orthopnea, PND or edema.  He's had no dizziness or syncope.         Past Medical History:   Diagnosis Date   • Arthritis    • Asthma    • BPH (benign prostatic hyperplasia)    • Carotid artery disease     RIGHT   • Colon polyp    • COPD (chronic obstructive pulmonary disease)    • Depression    • Diabetes mellitus 2002    type II, pt reports A1C of 7.1 in 3/2016   • Elbow pain, right    • Gout    • Heart murmur    • Hemorrhoids    • High cholesterol    • History of hepatitis C     In remission after Interferon and Ribavirin treatment   • HSV-2 infection    • Hypertension    • PAULETTE (obstructive sleep apnea) 2005    USES BIPAP, 2018 resolved, no longer uses cpap   • Plantar fasciitis    • Substance abuse     H/O DRUG ABUSE, NOW SOBER( IV DRUG USE)   • Substance abuse 1987    H/O ALCOHOL ABUSE   • Vitamin B 12 deficiency    • Vitamin D deficiency          Past Surgical History:   Procedure Laterality Date    • COLONOSCOPY N/A 09/10/2013    Normal, repeat in 10 years-Dr. Svetlana Ornelas   • COLONOSCOPY N/A 11/17/2006    Normal, repeat in 5 years-Dr. Jackson Denise   • COLONOSCOPY N/A 08/25/2009    Normal, repeat in 5 years-Dr. Jackson Denise   • COLONOSCOPY N/A 4/2/2018    Procedure: COLONOSCOPY into cecum and ti;  Surgeon: Svetlana Ornelas MD;  Location: [facility] ENDOSCOPY;  Service: Gastroenterology   • CYST REMOVAL N/A 06/22/2015    ON FOREHEAD, EPIDERMOID/SEBACEOUS, DR. SAHIL PFEIFFER   • CYSTOSCOPY     • SHOULDER SURGERY Bilateral 1995    BONE SPURS, DR. TYRA HESS   • TENNIS ELBOW RELEASE Right 7/18/2017    Procedure: RIGHT ELBOW OPEN FLEXOR TENDON DEBRIDEMENT AND REPAIR;  Surgeon: FABIO Hess MD;  Location: [facility] OR [facility] [place];  Service:        Current Outpatient Prescriptions on File Prior to Visit   Medication Sig Dispense Refill   • ACCU-CHEK FASTCLIX LANCETS misc TEST TID  5   • ACCU-CHEK SMARTVIEW test strip USE TO TEST BLOOD SUGAR BID  6   • amoxicillin-clavulanate (AUGMENTIN) 875-125 MG per tablet Take 1 tablet by mouth 2 (Two) Times a Day for 7 days. 14 tablet 0   • aspirin (ASPIRIN ADULT LOW STRENGTH) 81 MG EC tablet Take 1 tablet by mouth Daily.     • atorvastatin (LIPITOR) 20 MG tablet TAKE 1 TABLET BY MOUTH DAILY 90 tablet 0   • BD PEN NEEDLE SANDRA U/F 32G X 4 MM misc USE ONCE DAILY UTD  5   • cholecalciferol (VITAMIN D3) 1000 units tablet Take 2,000 Units by mouth Daily.     • Cyanocobalamin (VITAMIN B-12 PO) Take 5,000 mg by mouth Daily.     • glimepiride (AMARYL) 4 MG tablet Take 4 mg by mouth 2 (Two) Times a Day Before Meals.  5   • LANTUS SOLOSTAR 100 UNIT/ML injection pen Inject 12 Units under the skin Every Night.  5   • Linagliptin-Metformin HCl (JENTADUETO) 2.5-1000 MG tablet Take 1 tablet by mouth 2 (two) times a day.     • lisinopril (PRINIVIL,ZESTRIL) 20 MG tablet TAKE 1 TABLET BY MOUTH EVERY DAY 90 tablet 2   • nebivolol (BYSTOLIC) 10 MG tablet Take 1 tablet by mouth Daily. 90 tablet 3   • Omega-3  Fatty Acids (FISH OIL) 1000 MG capsule capsule Take  by mouth Daily With Breakfast.     • [DISCONTINUED] fluticasone (FLONASE) 50 MCG/ACT nasal spray 1 spray into each nostril As Needed. As needed  3   • [DISCONTINUED] meloxicam (MOBIC) 15 MG tablet TK 1 T PO D WC  1   • [DISCONTINUED] predniSONE (DELTASONE) 10 MG tablet Take 10 mg by mouth Daily. As needed  0   • [DISCONTINUED] benzonatate (TESSALON) 200 MG capsule Take 200 mg by mouth As Needed.  0   • [DISCONTINUED] budesonide-formoterol (SYMBICORT) 160-4.5 MCG/ACT inhaler Inhale 2 puffs 2 (Two) Times a Day. As needed     • [DISCONTINUED] buPROPion XL (WELLBUTRIN XL) 150 MG 24 hr tablet TK 1 T PO QAM  0   • [DISCONTINUED] lidocaine (XYLOCAINE) 5 % ointment APPLY TO AFFECTED AREA PRN  1   • [DISCONTINUED] mupirocin (BACTROBAN) 2 % cream Apply  topically 3 (Three) Times a Day. 30 g 0   • [DISCONTINUED] tamsulosin (FLOMAX) 0.4 MG capsule 24 hr capsule Take 2 capsules by mouth Daily.       No current facility-administered medications on file prior to visit.        Social History     Social History   • Marital status:      Spouse name: Lucy Cox   • Number of children: N/A   • Years of education: N/A     Occupational History   • Psychotherapist/ Other     [employer]     Social History Main Topics   • Smoking status: Former Smoker     Packs/day: 1.00     Years: 20.00     Types: Cigarettes     Start date: 1966     Quit date: 1986   • Smokeless tobacco: Never Used      Comment: Caffeine use 3 cups daily   • Alcohol use No      Comment: sober for 32 years   • Drug use: No   • Sexual activity: Defer     Other Topics Concern   • Not on file     Social History Narrative   • No narrative on file           Review of Systems   Constitution: Negative.   HENT: Negative for congestion.    Eyes: Negative for vision loss in left eye and vision loss in right eye.   Respiratory: Negative.  Negative for cough, hemoptysis, shortness of breath, sleep  "disturbances due to breathing, snoring, sputum production and wheezing.    Endocrine: Negative.    Hematologic/Lymphatic: Negative.    Skin: Negative for poor wound healing and rash.   Musculoskeletal: Negative for falls, gout, muscle cramps and myalgias.   Gastrointestinal: Negative for abdominal pain, diarrhea, dysphagia, hematemesis, melena, nausea and vomiting.   Neurological: Negative for excessive daytime sleepiness, dizziness, headaches, light-headedness, loss of balance, seizures and vertigo.   Psychiatric/Behavioral: Negative for depression and substance abuse. The patient is not nervous/anxious.        Procedures    ECG 12 Lead  Date/Time: 4/23/2018 2:44 PM  Performed by: JOSE CHANG  Authorized by: JOSE CHANG   Comparison: compared with previous ECG   Similar to previous ECG  Rhythm: sinus rhythm  Conduction: LAFB  Clinical impression: abnormal ECG                Objective:      Vitals:    04/23/18 1431   BP: 122/80   Pulse: 82   Weight: 99.8 kg (220 lb)   Height: 182.9 cm (72\")     Body mass index is 29.84 kg/m².    Physical Exam   Constitutional: He is oriented to person, place, and time. He appears well-developed and well-nourished. No distress.   HENT:   Head: Normocephalic and atraumatic.   Eyes: Conjunctivae are normal. No scleral icterus.   Neck: Neck supple. No JVD present. Carotid bruit is not present. No thyromegaly present.   Cardiovascular: Normal rate, regular rhythm, S1 normal, S2 normal, normal heart sounds and intact distal pulses.   No extrasystoles are present. PMI is not displaced.  Exam reveals no gallop.    No murmur heard.  Pulses:       Carotid pulses are 2+ on the right side, and 2+ on the left side.       Radial pulses are 2+ on the right side, and 2+ on the left side.        Dorsalis pedis pulses are 2+ on the right side, and 2+ on the left side.        Posterior tibial pulses are 2+ on the right side, and 2+ on the left side.   Pulmonary/Chest: Effort normal " and breath sounds normal. No respiratory distress. He has no wheezes. He has no rhonchi. He has no rales. He exhibits no tenderness.   Abdominal: Soft. Bowel sounds are normal. He exhibits no distension, no abdominal bruit and no mass. There is no tenderness.   Musculoskeletal: He exhibits no edema or deformity.   Lymphadenopathy:     He has no cervical adenopathy.   Neurological: He is alert and oriented to person, place, and time. No cranial nerve deficit.   Skin: Skin is warm and dry. No rash noted. He is not diaphoretic. No cyanosis. No pallor. Nails show no clubbing.   Psychiatric: He has a normal mood and affect. Judgment normal.   Vitals reviewed.      Lab Review:       Assessment:      Diagnosis Plan   1. Arteriosclerotic vascular disease     2. Essential hypertension     3. Hyperlipidemia, unspecified hyperlipidemia type     4. Type 2 diabetes mellitus without complication, without long-term current use of insulin     5. Chronic obstructive pulmonary disease, unspecified COPD type          1. Normal stress perfusion study 8/2012 and 1/2015.  Abnormal stress echocardiogram done 2/2018  2. Abnormal ECG. Left anterior fascicular block which is chronic.  3. Hypertension, labile.   4. Diabetes mellitus type 2. Stable.  5. Obstructive sleep apnea on CPAP. Getting retested for this.  6. Hyperlipidemia. On atorvastatin.   7. Hepatitis C, stable.  8. History of rheumatoid fever, stable. No valvular heart problems.   9. History of gout.  10. Plaquing of the left carotid artery.  11. Dyspnea on exertion with fatigue. Abnormal stress echocardiogram.   Plan:       Set up catheterization - he has a lot of risk factors and now an abnormal stress echocardiogram.  No med changes.  Risks and benefits of the procedure reviewed with the patient and he wants to proceed.

## 2018-04-24 PROBLEM — I10 ESSENTIAL HYPERTENSION: Status: ACTIVE | Noted: 2018-04-24

## 2018-04-24 PROBLEM — E11.9 TYPE 2 DIABETES MELLITUS WITHOUT COMPLICATION, WITHOUT LONG-TERM CURRENT USE OF INSULIN (HCC): Status: ACTIVE | Noted: 2018-04-24

## 2018-04-24 PROBLEM — R06.09 DYSPNEA ON EXERTION: Status: ACTIVE | Noted: 2018-04-24

## 2018-04-24 PROBLEM — R94.39 ABNORMAL STRESS ECHO: Status: ACTIVE | Noted: 2018-04-24

## 2018-04-25 ENCOUNTER — TELEPHONE (OUTPATIENT)
Dept: CARDIOLOGY | Facility: CLINIC | Age: 68
End: 2018-04-25

## 2018-04-25 NOTE — TELEPHONE ENCOUNTER
Pt called. He is scheduled for his cath next weds 5/2, and he has some concerns about and would like for you to call him so he can discuss. He is flying out on 5/15 and also wanted to make sure that was ok. His c/b is [phone].

## 2018-05-01 ENCOUNTER — LAB (OUTPATIENT)
Dept: LAB | Facility: HOSPITAL | Age: 68
End: 2018-05-01
Attending: INTERNAL MEDICINE

## 2018-05-01 ENCOUNTER — TRANSCRIBE ORDERS (OUTPATIENT)
Dept: CARDIOLOGY | Facility: CLINIC | Age: 68
End: 2018-05-01

## 2018-05-01 DIAGNOSIS — R94.39 ABNORMAL BALLISTOCARDIOGRAM: ICD-10-CM

## 2018-05-01 DIAGNOSIS — Z01.810 PRE-OPERATIVE CARDIOVASCULAR EXAMINATION: ICD-10-CM

## 2018-05-01 DIAGNOSIS — Z13.6 SCREENING FOR ISCHEMIC HEART DISEASE: ICD-10-CM

## 2018-05-01 DIAGNOSIS — Z13.6 SCREENING FOR ISCHEMIC HEART DISEASE: Primary | ICD-10-CM

## 2018-05-01 LAB
ANION GAP SERPL CALCULATED.3IONS-SCNC: 12.3 MMOL/L
BASOPHILS # BLD AUTO: 0.03 10*3/MM3 (ref 0–0.2)
BASOPHILS NFR BLD AUTO: 0.2 % (ref 0–1.5)
BUN BLD-MCNC: 13 MG/DL (ref 8–23)
BUN/CREAT SERPL: 14.4 (ref 7–25)
CALCIUM SPEC-SCNC: 9.3 MG/DL (ref 8.6–10.5)
CHLORIDE SERPL-SCNC: 98 MMOL/L (ref 98–107)
CO2 SERPL-SCNC: 27.7 MMOL/L (ref 22–29)
CREAT BLD-MCNC: 0.9 MG/DL (ref 0.76–1.27)
DEPRECATED RDW RBC AUTO: 40.9 FL (ref 37–54)
EOSINOPHIL # BLD AUTO: 0.28 10*3/MM3 (ref 0–0.7)
EOSINOPHIL NFR BLD AUTO: 2.1 % (ref 0.3–6.2)
ERYTHROCYTE [DISTWIDTH] IN BLOOD BY AUTOMATED COUNT: 12.7 % (ref 11.5–14.5)
GFR SERPL CREATININE-BSD FRML MDRD: 84 ML/MIN/1.73
GLUCOSE BLD-MCNC: 180 MG/DL (ref 65–99)
HCT VFR BLD AUTO: 44.5 % (ref 40.4–52.2)
HGB BLD-MCNC: 15.3 G/DL (ref 13.7–17.6)
IMM GRANULOCYTES # BLD: 0.05 10*3/MM3 (ref 0–0.03)
IMM GRANULOCYTES NFR BLD: 0.4 % (ref 0–0.5)
LYMPHOCYTES # BLD AUTO: 2.41 10*3/MM3 (ref 0.9–4.8)
LYMPHOCYTES NFR BLD AUTO: 18.3 % (ref 19.6–45.3)
MCH RBC QN AUTO: 30.7 PG (ref 27–32.7)
MCHC RBC AUTO-ENTMCNC: 34.4 G/DL (ref 32.6–36.4)
MCV RBC AUTO: 89.4 FL (ref 79.8–96.2)
MONOCYTES # BLD AUTO: 0.92 10*3/MM3 (ref 0.2–1.2)
MONOCYTES NFR BLD AUTO: 7 % (ref 5–12)
NEUTROPHILS # BLD AUTO: 9.54 10*3/MM3 (ref 1.9–8.1)
NEUTROPHILS NFR BLD AUTO: 72.4 % (ref 42.7–76)
PLATELET # BLD AUTO: 227 10*3/MM3 (ref 140–500)
PMV BLD AUTO: 10.4 FL (ref 6–12)
POTASSIUM BLD-SCNC: 5.2 MMOL/L (ref 3.5–5.2)
RBC # BLD AUTO: 4.98 10*6/MM3 (ref 4.6–6)
SODIUM BLD-SCNC: 138 MMOL/L (ref 136–145)
WBC NRBC COR # BLD: 13.18 10*3/MM3 (ref 4.5–10.7)

## 2018-05-01 PROCEDURE — 80048 BASIC METABOLIC PNL TOTAL CA: CPT

## 2018-05-01 PROCEDURE — 85025 COMPLETE CBC W/AUTO DIFF WBC: CPT

## 2018-05-01 PROCEDURE — 36415 COLL VENOUS BLD VENIPUNCTURE: CPT

## 2018-05-02 ENCOUNTER — HOSPITAL ENCOUNTER (OUTPATIENT)
Facility: HOSPITAL | Age: 68
Setting detail: HOSPITAL OUTPATIENT SURGERY
Discharge: HOME | End: 2018-05-02
Attending: INTERNAL MEDICINE | Admitting: INTERNAL MEDICINE

## 2018-05-02 VITALS
TEMPERATURE: 98.3 F | HEART RATE: 81 BPM | OXYGEN SATURATION: 96 % | SYSTOLIC BLOOD PRESSURE: 108 MMHG | DIASTOLIC BLOOD PRESSURE: 70 MMHG | HEIGHT: 72 IN | RESPIRATION RATE: 16 BRPM | WEIGHT: 216 LBS | BODY MASS INDEX: 29.26 KG/M2

## 2018-05-02 DIAGNOSIS — I10 ESSENTIAL HYPERTENSION: ICD-10-CM

## 2018-05-02 DIAGNOSIS — E11.9 TYPE 2 DIABETES MELLITUS WITHOUT COMPLICATION, WITHOUT LONG-TERM CURRENT USE OF INSULIN (HCC): ICD-10-CM

## 2018-05-02 DIAGNOSIS — R06.09 DYSPNEA ON EXERTION: ICD-10-CM

## 2018-05-02 DIAGNOSIS — E78.5 HYPERLIPIDEMIA, UNSPECIFIED HYPERLIPIDEMIA TYPE: ICD-10-CM

## 2018-05-02 DIAGNOSIS — R94.39 ABNORMAL STRESS ECHO: ICD-10-CM

## 2018-05-02 LAB — GLUCOSE BLDC GLUCOMTR-MCNC: 171 MG/DL (ref 70–130)

## 2018-05-02 PROCEDURE — 25010000002 FENTANYL CITRATE (PF) 100 MCG/2ML SOLUTION: Performed by: INTERNAL MEDICINE

## 2018-05-02 PROCEDURE — 25010000002 HEPARIN (PORCINE) PER 1000 UNITS: Performed by: INTERNAL MEDICINE

## 2018-05-02 PROCEDURE — 0 IOPAMIDOL PER 1 ML: Performed by: INTERNAL MEDICINE

## 2018-05-02 PROCEDURE — C1894 INTRO/SHEATH, NON-LASER: HCPCS | Performed by: INTERNAL MEDICINE

## 2018-05-02 PROCEDURE — C1769 GUIDE WIRE: HCPCS | Performed by: INTERNAL MEDICINE

## 2018-05-02 PROCEDURE — 25010000002 MIDAZOLAM PER 1 MG: Performed by: INTERNAL MEDICINE

## 2018-05-02 PROCEDURE — 93458 L HRT ARTERY/VENTRICLE ANGIO: CPT | Performed by: INTERNAL MEDICINE

## 2018-05-02 PROCEDURE — 82962 GLUCOSE BLOOD TEST: CPT

## 2018-05-02 RX ORDER — MIDAZOLAM HYDROCHLORIDE 1 MG/ML
INJECTION INTRAMUSCULAR; INTRAVENOUS AS NEEDED
Status: DISCONTINUED | OUTPATIENT
Start: 2018-05-02 | End: 2018-05-02 | Stop reason: HOSPADM

## 2018-05-02 RX ORDER — SODIUM CHLORIDE 0.9 % (FLUSH) 0.9 %
1-10 SYRINGE (ML) INJECTION AS NEEDED
Status: DISCONTINUED | OUTPATIENT
Start: 2018-05-02 | End: 2018-05-02 | Stop reason: HOSPADM

## 2018-05-02 RX ORDER — FENTANYL CITRATE 50 UG/ML
INJECTION, SOLUTION INTRAMUSCULAR; INTRAVENOUS AS NEEDED
Status: DISCONTINUED | OUTPATIENT
Start: 2018-05-02 | End: 2018-05-02 | Stop reason: HOSPADM

## 2018-05-02 RX ORDER — SODIUM CHLORIDE 9 MG/ML
100 INJECTION, SOLUTION INTRAVENOUS CONTINUOUS
Status: DISCONTINUED | OUTPATIENT
Start: 2018-05-02 | End: 2018-05-02 | Stop reason: HOSPADM

## 2018-05-02 RX ORDER — LIDOCAINE HYDROCHLORIDE 20 MG/ML
INJECTION, SOLUTION INFILTRATION; PERINEURAL AS NEEDED
Status: DISCONTINUED | OUTPATIENT
Start: 2018-05-02 | End: 2018-05-02 | Stop reason: HOSPADM

## 2018-05-02 RX ORDER — SODIUM CHLORIDE 9 MG/ML
125 INJECTION, SOLUTION INTRAVENOUS CONTINUOUS
Status: DISCONTINUED | OUTPATIENT
Start: 2018-05-02 | End: 2018-05-02 | Stop reason: HOSPADM

## 2018-05-02 RX ORDER — TAMSULOSIN HYDROCHLORIDE 0.4 MG/1
0.8 CAPSULE ORAL DAILY
COMMUNITY

## 2018-05-02 RX ORDER — LIDOCAINE HYDROCHLORIDE 10 MG/ML
0.1 INJECTION, SOLUTION EPIDURAL; INFILTRATION; INTRACAUDAL; PERINEURAL ONCE AS NEEDED
Status: DISCONTINUED | OUTPATIENT
Start: 2018-05-02 | End: 2018-05-02 | Stop reason: HOSPADM

## 2018-05-02 RX ADMIN — SODIUM CHLORIDE 125 ML/HR: 9 INJECTION, SOLUTION INTRAVENOUS at 08:06

## 2018-05-02 NOTE — H&P (VIEW-ONLY)
Date of Office Visit: 2018  Encounter Provider: Isabelle Flores MD  Place of Service: [facility] CARDIOLOGY  Patient Name: Mike Cox  :1950      Patient ID:  Mike Cox is a 67 y.o. male is here for  followup for labile BP.         History of Present Illness    He has hypertension, hyperlipidemia, diabetes mellitus type 2, and obstructive sleep  apnea. He first presented to the office for chest pain. Because of his cardiovascular  risks, he had an exercise nuclear stress study done in  which was negative for  ischemia. He had a lot of fatigue at that time and palpitations which were treated and  got better. His Holter recording done in 2008 showed premature ventricular complexes.   He had vascular screening done on 2009 which was normal. He had a cardiac duplex   study performed on 2011 which showed moderate intimal thickening of the right   common carotid artery with mild thickening of that carotid bulb but no thickening of his stenosis.   He presented in 2008 with fatigue and had a stress nuclear perfusion study showing no evidence   of ischemia. His echocardiogram then showed an ejection fraction of 65% with grade I diastolic dysfunction  and no significant valvular heart problems.       His last echocardiogram done 2011, showed an ejection fraction of 54% with grade 1  diastolic dysfunction and no significant valvular heart problems. He had a stress nuclear 2012  at [facility] and had no ischemia.      He sees Dr. Ireland from endocrinology for his lipid management. He did see Dr. Sampson for   PAULETTE and COPD.          He had some mild dyspnea with exertion at his last visit and called and said it  was getting even worse, so he had a stress nuclear perfusion study done on 2015,  showing no evidence of ischemia and ejection fraction of 57%. He had a 2-D echocardiogram  with Doppler done on 2014, showing an ejection  fraction of 54% and grade 1 diastolic  dysfunction. He had carotid duplex studies done on 04/28/2015, which showed moderate  intimal thickening of the right internal carotid artery and moderate plaque in the left  internal carotid artery.       He had vascular screening done on 03/20/2016, which showed plaquing in both carotid bulbs but it was otherwise unremarkable.        The patient had vascular screening done 06/06/2017, which was normal. He had normal vascular screening done 1/2018.       He had a stress echocardiogram done 2/1/18 showing baseline ejection fraction 59% with postexercise ejection fraction of 68% with septal hypokinesis which may be secondary to bundle branch block.  There was noted to be grade 1 diastolic dysfunction and no valvular disease. This was done for dyspnea on exertion.     He continues had dyspnea on exertion and he's noticing increasing fatigue with activity.  He has occasional fleeting chest tightness which doesn't sound anginal.  He doesn't feels heart racing or skipping.  He's had no orthopnea, PND or edema.  He's had no dizziness or syncope.         Past Medical History:   Diagnosis Date   • Arthritis    • Asthma    • BPH (benign prostatic hyperplasia)    • Carotid artery disease     RIGHT   • Colon polyp    • COPD (chronic obstructive pulmonary disease)    • Depression    • Diabetes mellitus 2002    type II, pt reports A1C of 7.1 in 3/2016   • Elbow pain, right    • Gout    • Heart murmur    • Hemorrhoids    • High cholesterol    • History of hepatitis C     In remission after Interferon and Ribavirin treatment   • HSV-2 infection    • Hypertension    • PAULETTE (obstructive sleep apnea) 2005    USES BIPAP, 2018 resolved, no longer uses cpap   • Plantar fasciitis    • Substance abuse     H/O DRUG ABUSE, NOW SOBER( IV DRUG USE)   • Substance abuse 1987    H/O ALCOHOL ABUSE   • Vitamin B 12 deficiency    • Vitamin D deficiency          Past Surgical History:   Procedure Laterality Date    • COLONOSCOPY N/A 09/10/2013    Normal, repeat in 10 years-Dr. Svetlana Ornelas   • COLONOSCOPY N/A 11/17/2006    Normal, repeat in 5 years-Dr. Jackson Denise   • COLONOSCOPY N/A 08/25/2009    Normal, repeat in 5 years-Dr. Jackson Denise   • COLONOSCOPY N/A 4/2/2018    Procedure: COLONOSCOPY into cecum and ti;  Surgeon: Svetlana Ornelas MD;  Location: [facility] ENDOSCOPY;  Service: Gastroenterology   • CYST REMOVAL N/A 06/22/2015    ON FOREHEAD, EPIDERMOID/SEBACEOUS, DR. SAHIL PFEIFFER   • CYSTOSCOPY     • SHOULDER SURGERY Bilateral 1995    BONE SPURS, DR. TYRA HESS   • TENNIS ELBOW RELEASE Right 7/18/2017    Procedure: RIGHT ELBOW OPEN FLEXOR TENDON DEBRIDEMENT AND REPAIR;  Surgeon: FABIO Hess MD;  [place] [facility] [place] [facility] [place]  Service:        Current Outpatient Prescriptions on File Prior to Visit   Medication Sig Dispense Refill   • ACCU-CHEK FASTCLIX LANCETS misc TEST TID  5   • ACCU-CHEK SMARTVIEW test strip USE TO TEST BLOOD SUGAR BID  6   • amoxicillin-clavulanate (AUGMENTIN) 875-125 MG per tablet Take 1 tablet by mouth 2 (Two) Times a Day for 7 days. 14 tablet 0   • aspirin (ASPIRIN ADULT LOW STRENGTH) 81 MG EC tablet Take 1 tablet by mouth Daily.     • atorvastatin (LIPITOR) 20 MG tablet TAKE 1 TABLET BY MOUTH DAILY 90 tablet 0   • BD PEN NEEDLE SANDRA U/F 32G X 4 MM misc USE ONCE DAILY UTD  5   • cholecalciferol (VITAMIN D3) 1000 units tablet Take 2,000 Units by mouth Daily.     • Cyanocobalamin (VITAMIN B-12 PO) Take 5,000 mg by mouth Daily.     • glimepiride (AMARYL) 4 MG tablet Take 4 mg by mouth 2 (Two) Times a Day Before Meals.  5   • LANTUS SOLOSTAR 100 UNIT/ML injection pen Inject 12 Units under the skin Every Night.  5   • Linagliptin-Metformin HCl (JENTADUETO) 2.5-1000 MG tablet Take 1 tablet by mouth 2 (two) times a day.     • lisinopril (PRINIVIL,ZESTRIL) 20 MG tablet TAKE 1 TABLET BY MOUTH EVERY DAY 90 tablet 2   • nebivolol (BYSTOLIC) 10 MG tablet Take 1 tablet by mouth Daily. 90 tablet 3   • Omega-3  Fatty Acids (FISH OIL) 1000 MG capsule capsule Take  by mouth Daily With Breakfast.     • [DISCONTINUED] fluticasone (FLONASE) 50 MCG/ACT nasal spray 1 spray into each nostril As Needed. As needed  3   • [DISCONTINUED] meloxicam (MOBIC) 15 MG tablet TK 1 T PO D WC  1   • [DISCONTINUED] predniSONE (DELTASONE) 10 MG tablet Take 10 mg by mouth Daily. As needed  0   • [DISCONTINUED] benzonatate (TESSALON) 200 MG capsule Take 200 mg by mouth As Needed.  0   • [DISCONTINUED] budesonide-formoterol (SYMBICORT) 160-4.5 MCG/ACT inhaler Inhale 2 puffs 2 (Two) Times a Day. As needed     • [DISCONTINUED] buPROPion XL (WELLBUTRIN XL) 150 MG 24 hr tablet TK 1 T PO QAM  0   • [DISCONTINUED] lidocaine (XYLOCAINE) 5 % ointment APPLY TO AFFECTED AREA PRN  1   • [DISCONTINUED] mupirocin (BACTROBAN) 2 % cream Apply  topically 3 (Three) Times a Day. 30 g 0   • [DISCONTINUED] tamsulosin (FLOMAX) 0.4 MG capsule 24 hr capsule Take 2 capsules by mouth Daily.       No current facility-administered medications on file prior to visit.        Social History     Social History   • Marital status:      Spouse name: Lucy Cox   • Number of children: N/A   • Years of education: N/A     Occupational History   • Psychotherapist/ Other     [employer]     Social History Main Topics   • Smoking status: Former Smoker     Packs/day: 1.00     Years: 20.00     Types: Cigarettes     Start date: 1966     Quit date: 1986   • Smokeless tobacco: Never Used      Comment: Caffeine use 3 cups daily   • Alcohol use No      Comment: sober for 32 years   • Drug use: No   • Sexual activity: Defer     Other Topics Concern   • Not on file     Social History Narrative   • No narrative on file           Review of Systems   Constitution: Negative.   HENT: Negative for congestion.    Eyes: Negative for vision loss in left eye and vision loss in right eye.   Respiratory: Negative.  Negative for cough, hemoptysis, shortness of breath, sleep  "disturbances due to breathing, snoring, sputum production and wheezing.    Endocrine: Negative.    Hematologic/Lymphatic: Negative.    Skin: Negative for poor wound healing and rash.   Musculoskeletal: Negative for falls, gout, muscle cramps and myalgias.   Gastrointestinal: Negative for abdominal pain, diarrhea, dysphagia, hematemesis, melena, nausea and vomiting.   Neurological: Negative for excessive daytime sleepiness, dizziness, headaches, light-headedness, loss of balance, seizures and vertigo.   Psychiatric/Behavioral: Negative for depression and substance abuse. The patient is not nervous/anxious.        Procedures    ECG 12 Lead  Date/Time: 4/23/2018 2:44 PM  Performed by: JOSE CHANG  Authorized by: JOSE CHANG   Comparison: compared with previous ECG   Similar to previous ECG  Rhythm: sinus rhythm  Conduction: LAFB  Clinical impression: abnormal ECG                Objective:      Vitals:    04/23/18 1431   BP: 122/80   Pulse: 82   Weight: 99.8 kg (220 lb)   Height: 182.9 cm (72\")     Body mass index is 29.84 kg/m².    Physical Exam   Constitutional: He is oriented to person, place, and time. He appears well-developed and well-nourished. No distress.   HENT:   Head: Normocephalic and atraumatic.   Eyes: Conjunctivae are normal. No scleral icterus.   Neck: Neck supple. No JVD present. Carotid bruit is not present. No thyromegaly present.   Cardiovascular: Normal rate, regular rhythm, S1 normal, S2 normal, normal heart sounds and intact distal pulses.   No extrasystoles are present. PMI is not displaced.  Exam reveals no gallop.    No murmur heard.  Pulses:       Carotid pulses are 2+ on the right side, and 2+ on the left side.       Radial pulses are 2+ on the right side, and 2+ on the left side.        Dorsalis pedis pulses are 2+ on the right side, and 2+ on the left side.        Posterior tibial pulses are 2+ on the right side, and 2+ on the left side.   Pulmonary/Chest: Effort normal " and breath sounds normal. No respiratory distress. He has no wheezes. He has no rhonchi. He has no rales. He exhibits no tenderness.   Abdominal: Soft. Bowel sounds are normal. He exhibits no distension, no abdominal bruit and no mass. There is no tenderness.   Musculoskeletal: He exhibits no edema or deformity.   Lymphadenopathy:     He has no cervical adenopathy.   Neurological: He is alert and oriented to person, place, and time. No cranial nerve deficit.   Skin: Skin is warm and dry. No rash noted. He is not diaphoretic. No cyanosis. No pallor. Nails show no clubbing.   Psychiatric: He has a normal mood and affect. Judgment normal.   Vitals reviewed.      Lab Review:       Assessment:      Diagnosis Plan   1. Arteriosclerotic vascular disease     2. Essential hypertension     3. Hyperlipidemia, unspecified hyperlipidemia type     4. Type 2 diabetes mellitus without complication, without long-term current use of insulin     5. Chronic obstructive pulmonary disease, unspecified COPD type          1. Normal stress perfusion study 8/2012 and 1/2015.  Abnormal stress echocardiogram done 2/2018  2. Abnormal ECG. Left anterior fascicular block which is chronic.  3. Hypertension, labile.   4. Diabetes mellitus type 2. Stable.  5. Obstructive sleep apnea on CPAP. Getting retested for this.  6. Hyperlipidemia. On atorvastatin.   7. Hepatitis C, stable.  8. History of rheumatoid fever, stable. No valvular heart problems.   9. History of gout.  10. Plaquing of the left carotid artery.  11. Dyspnea on exertion with fatigue. Abnormal stress echocardiogram.   Plan:       Set up catheterization - he has a lot of risk factors and now an abnormal stress echocardiogram.  No med changes.  Risks and benefits of the procedure reviewed with the patient and he wants to proceed.

## 2018-05-02 NOTE — INTERVAL H&P NOTE
Multiple cardiac risk factors ongoing shortness of breath abnormal stress test referred for cardiac catheter. H&P reviewed. The patient was examined and there are no changes to the H&P. I have explained the risks and benefits of the procedure to the patient.  The patient understands and agrees to proceed

## 2018-05-04 ENCOUNTER — LAB (OUTPATIENT)
Dept: LAB | Facility: HOSPITAL | Age: 68
End: 2018-05-04

## 2018-05-04 DIAGNOSIS — Z98.890 STATUS POST CARDIAC CATHETERIZATION: ICD-10-CM

## 2018-05-04 DIAGNOSIS — Z98.890 STATUS POST CARDIAC CATHETERIZATION: Primary | ICD-10-CM

## 2018-05-04 LAB
ANION GAP SERPL CALCULATED.3IONS-SCNC: 10.2 MMOL/L
BUN BLD-MCNC: 11 MG/DL (ref 8–23)
BUN/CREAT SERPL: 14.3 (ref 7–25)
CALCIUM SPEC-SCNC: 9.5 MG/DL (ref 8.6–10.5)
CHLORIDE SERPL-SCNC: 97 MMOL/L (ref 98–107)
CO2 SERPL-SCNC: 29.8 MMOL/L (ref 22–29)
CREAT BLD-MCNC: 0.77 MG/DL (ref 0.76–1.27)
GFR SERPL CREATININE-BSD FRML MDRD: 100 ML/MIN/1.73
GLUCOSE BLD-MCNC: 129 MG/DL (ref 65–99)
POTASSIUM BLD-SCNC: 4.8 MMOL/L (ref 3.5–5.2)
SODIUM BLD-SCNC: 137 MMOL/L (ref 136–145)

## 2018-05-04 PROCEDURE — 36415 COLL VENOUS BLD VENIPUNCTURE: CPT

## 2018-05-04 PROCEDURE — 80048 BASIC METABOLIC PNL TOTAL CA: CPT

## 2018-05-14 ENCOUNTER — DOCUMENTATION (OUTPATIENT)
Dept: CARDIOLOGY | Facility: HOSPITAL | Age: 68
End: 2018-05-14

## 2018-06-15 RX ORDER — ATORVASTATIN CALCIUM 20 MG/1
20 TABLET, FILM COATED ORAL DAILY
Qty: 90 TABLET | Refills: 0 | Status: SHIPPED | OUTPATIENT
Start: 2018-06-15 | End: 2018-08-10 | Stop reason: SDUPTHER

## 2018-06-19 ENCOUNTER — LAB (OUTPATIENT)
Dept: LAB | Facility: HOSPITAL | Age: 68
End: 2018-06-19

## 2018-06-19 ENCOUNTER — TRANSCRIBE ORDERS (OUTPATIENT)
Dept: ADMINISTRATIVE | Facility: HOSPITAL | Age: 68
End: 2018-06-19

## 2018-06-19 DIAGNOSIS — E78.2 MIXED HYPERLIPIDEMIA: ICD-10-CM

## 2018-06-19 DIAGNOSIS — E11.65 POORLY CONTROLLED DIABETES MELLITUS (HCC): ICD-10-CM

## 2018-06-19 DIAGNOSIS — E11.65 POORLY CONTROLLED DIABETES MELLITUS (HCC): Primary | ICD-10-CM

## 2018-06-19 LAB
ALBUMIN SERPL-MCNC: 5 G/DL (ref 3.5–5.2)
ALBUMIN UR-MCNC: <1.2 MG/L
ALBUMIN/GLOB SERPL: 2.1 G/DL
ALP SERPL-CCNC: 93 U/L (ref 39–117)
ALT SERPL W P-5'-P-CCNC: 35 U/L (ref 1–41)
ANION GAP SERPL CALCULATED.3IONS-SCNC: 10.5 MMOL/L
AST SERPL-CCNC: 27 U/L (ref 1–40)
BILIRUB SERPL-MCNC: 0.6 MG/DL (ref 0.1–1.2)
BUN BLD-MCNC: 18 MG/DL (ref 8–23)
BUN/CREAT SERPL: 20.9 (ref 7–25)
CALCIUM SPEC-SCNC: 9.4 MG/DL (ref 8.6–10.5)
CHLORIDE SERPL-SCNC: 101 MMOL/L (ref 98–107)
CHOLEST SERPL-MCNC: 132 MG/DL (ref 0–200)
CO2 SERPL-SCNC: 25.5 MMOL/L (ref 22–29)
CREAT BLD-MCNC: 0.86 MG/DL (ref 0.76–1.27)
CREAT UR-MCNC: 112.5 MG/DL
GFR SERPL CREATININE-BSD FRML MDRD: 88 ML/MIN/1.73
GLOBULIN UR ELPH-MCNC: 2.4 GM/DL
GLUCOSE BLD-MCNC: 140 MG/DL (ref 65–99)
HDLC SERPL-MCNC: 44 MG/DL (ref 40–60)
LDLC SERPL CALC-MCNC: 66 MG/DL (ref 0–100)
LDLC/HDLC SERPL: 1.5 {RATIO}
MICROALBUMIN/CREAT UR: NORMAL MG/G
POTASSIUM BLD-SCNC: 4.4 MMOL/L (ref 3.5–5.2)
PROT SERPL-MCNC: 7.4 G/DL (ref 6–8.5)
SODIUM BLD-SCNC: 137 MMOL/L (ref 136–145)
TRIGL SERPL-MCNC: 109 MG/DL (ref 0–150)
TSH SERPL DL<=0.05 MIU/L-ACNC: 3.84 MIU/ML (ref 0.27–4.2)
VLDLC SERPL-MCNC: 21.8 MG/DL (ref 5–40)

## 2018-06-19 PROCEDURE — 82043 UR ALBUMIN QUANTITATIVE: CPT | Performed by: INTERNAL MEDICINE

## 2018-06-19 PROCEDURE — 82570 ASSAY OF URINE CREATININE: CPT | Performed by: INTERNAL MEDICINE

## 2018-06-19 PROCEDURE — 84443 ASSAY THYROID STIM HORMONE: CPT

## 2018-06-19 PROCEDURE — 36415 COLL VENOUS BLD VENIPUNCTURE: CPT

## 2018-06-19 PROCEDURE — 84681 ASSAY OF C-PEPTIDE: CPT | Performed by: INTERNAL MEDICINE

## 2018-06-19 PROCEDURE — 80061 LIPID PANEL: CPT | Performed by: INTERNAL MEDICINE

## 2018-06-19 PROCEDURE — 80053 COMPREHEN METABOLIC PANEL: CPT

## 2018-06-20 ENCOUNTER — OFFICE VISIT (OUTPATIENT)
Dept: CARDIOLOGY | Facility: CLINIC | Age: 68
End: 2018-06-20

## 2018-06-20 VITALS
HEIGHT: 72 IN | HEART RATE: 72 BPM | BODY MASS INDEX: 29.2 KG/M2 | WEIGHT: 215.6 LBS | DIASTOLIC BLOOD PRESSURE: 82 MMHG | SYSTOLIC BLOOD PRESSURE: 124 MMHG

## 2018-06-20 DIAGNOSIS — E11.9 TYPE 2 DIABETES MELLITUS WITHOUT COMPLICATION, WITHOUT LONG-TERM CURRENT USE OF INSULIN (HCC): ICD-10-CM

## 2018-06-20 DIAGNOSIS — I10 ESSENTIAL HYPERTENSION: Primary | ICD-10-CM

## 2018-06-20 DIAGNOSIS — E78.5 HYPERLIPIDEMIA, UNSPECIFIED HYPERLIPIDEMIA TYPE: ICD-10-CM

## 2018-06-20 LAB — C PEPTIDE SERPL-MCNC: 3.2 NG/ML (ref 1.1–4.4)

## 2018-06-20 PROCEDURE — 93000 ELECTROCARDIOGRAM COMPLETE: CPT | Performed by: INTERNAL MEDICINE

## 2018-06-20 PROCEDURE — 99214 OFFICE O/P EST MOD 30 MIN: CPT | Performed by: INTERNAL MEDICINE

## 2018-06-20 RX ORDER — TRIAMCINOLONE ACETONIDE 0.25 MG/G
CREAM TOPICAL
Refills: 1 | COMMUNITY
Start: 2018-04-20 | End: 2019-06-03

## 2018-06-20 NOTE — PROGRESS NOTES
Date of Office Visit: 2018  Encounter Provider: Isabelle Flores MD  Place of Service: [facility] CARDIOLOGY  Patient Name: Mike Cox  :1950      Patient ID:  Mike Cox is a 67 y.o. male is here for  followup for cardiac risks.         History of Present Illness    He has hypertension, hyperlipidemia, diabetes mellitus type 2, and obstructive sleep  apnea. He first presented to the office for chest pain. Because of his cardiovascular  risks, he had an exercise nuclear stress study done in  which was negative for  ischemia. He had a lot of fatigue at that time and palpitations which were treated and  got better. His Holter recording done in 2008 showed premature ventricular complexes.   He had vascular screening done on 2009 which was normal. He had a cardiac duplex   study performed on 2011 which showed moderate intimal thickening of the right   common carotid artery with mild thickening of that carotid bulb but no thickening of his stenosis.   He presented in 2008 with fatigue and had a stress nuclear perfusion study showing no evidence   of ischemia. His echocardiogram then showed an ejection fraction of 65% with grade I diastolic dysfunction  and no significant valvular heart problems.       His last echocardiogram done 2011, showed an ejection fraction of 54% with grade 1  diastolic dysfunction and no significant valvular heart problems. He had a stress nuclear 2012  at [facility] and had no ischemia.      He sees Dr. Ireland from endocrinology for his lipid management. He did see Dr. Sampson for   PAULETTE and COPD.          He had some mild dyspnea with exertion at his last visit and called and said it  was getting even worse, so he had a stress nuclear perfusion study done on 2015,  showing no evidence of ischemia and ejection fraction of 57%. He had a 2-D echocardiogram  with Doppler done on 2014, showing an  ejection fraction of 54% and grade 1 diastolic  dysfunction. He had carotid duplex studies done on 04/28/2015, which showed moderate  intimal thickening of the right internal carotid artery and moderate plaque in the left  internal carotid artery.       He had vascular screening done on 03/20/2016, which showed plaquing in both carotid bulbs but it was otherwise unremarkable.        The patient had vascular screening done 06/06/2017, which was normal. He had normal vascular screening done 1/2018.         He had a stress echocardiogram done 2/1/18 showing baseline ejection fraction 59% with postexercise ejection fraction of 68% with septal hypokinesis which may be secondary to bundle branch block.  There was noted to be grade 1 diastolic dysfunction and no valvular disease. This was done for dyspnea on exertion.  cardiac catheterization done 5/2/18 which showed normal left main, large dominant normal circumflex, small nondominant RCA, 10-20% proximal LAD stenosis with normal mid and distal LAD.  30-40% first diagonal stenosis.  Normal left ventricular systolic function.    He had laboratory values done June 2018 showing HDL 44 and LDL 66.  His CMP was unremarkable.  He is on a Paleo diet and has lost weight.  He has gone off of insulin due to this.  He has no chest pain or pressure.  Admitted tachycardia difference or syncope.  His energy level is good.       Past Medical History:   Diagnosis Date   • Arteriosclerotic vascular disease    • Arthritis    • Asthma    • BPH (benign prostatic hyperplasia)    • Carotid artery disease     RIGHT   • Colon polyp    • COPD (chronic obstructive pulmonary disease)    • Depression    • Diabetes mellitus 2002    type II, pt reports A1C of 7.1 in 3/2016   • Dyspnea on exertion     with fatigue   • Elbow pain, right    • Gout    • Heart murmur    • Hemorrhoids    • High cholesterol    • History of hepatitis C     In remission after Interferon and Ribavirin treatment   • HSV-2  infection    • Hyperlipidemia    • Hypertension    • PAULETTE (obstructive sleep apnea) 2005    USES BIPAP, 2018 resolved, no longer uses cpap   • Plantar fasciitis    • Substance abuse     H/O DRUG ABUSE, NOW SOBER( IV DRUG USE)   • Substance abuse 1987    H/O ALCOHOL ABUSE   • Vitamin B 12 deficiency    • Vitamin D deficiency          Past Surgical History:   Procedure Laterality Date   • CARDIAC CATHETERIZATION N/A 5/2/2018    Procedure: Coronary angiography;  Surgeon: Maik Preciado MD;  Location: [facility] CATH INVASIVE LOCATION;  Service: Cardiovascular   • CARDIAC CATHETERIZATION N/A 5/2/2018    Procedure: Left ventriculography;  Surgeon: Maik Prceiado MD;  Location: [facility] CATH INVASIVE LOCATION;  Service: Cardiovascular   • COLONOSCOPY N/A 09/10/2013    Normal, repeat in 10 years-Dr. Svetlana Ornelas   • COLONOSCOPY N/A 11/17/2006    Normal, repeat in 5 years-Dr. Jackson Denise   • COLONOSCOPY N/A 08/25/2009    Normal, repeat in 5 years-Dr. Jackson Denise   • COLONOSCOPY N/A 4/2/2018    Procedure: COLONOSCOPY into cecum and ti;  Surgeon: Svetlana Ornelas MD;  Location: [facility] ENDOSCOPY;  Service: Gastroenterology   • CYST REMOVAL N/A 06/22/2015    ON FOREHEAD, EPIDERMOID/SEBACEOUS, DR. SAHIL PFEIFFER   • CYSTOSCOPY     • SHOULDER SURGERY Bilateral 1995    BONE SPURS, DR. TYRA HESS   • TENNIS ELBOW RELEASE Right 7/18/2017    Procedure: RIGHT ELBOW OPEN FLEXOR TENDON DEBRIDEMENT AND REPAIR;  Surgeon: FABIO Hess MD;  [place] [facility] [place] [facility]  Service:        Current Outpatient Prescriptions on File Prior to Visit   Medication Sig Dispense Refill   • ACCU-CHEK FASTCLIX LANCETS misc TEST TID  5   • ACCU-CHEK SMARTVIEW test strip USE TO TEST BLOOD SUGAR BID  6   • aspirin (ASPIRIN ADULT LOW STRENGTH) 81 MG EC tablet Take 1 tablet by mouth Daily.     • atorvastatin (LIPITOR) 20 MG tablet TAKE 1 TABLET BY MOUTH DAILY 90 tablet 0   • cholecalciferol (VITAMIN D3) 1000 units tablet Take 2,000 Units by mouth Daily.     •  Cyanocobalamin (VITAMIN B-12 PO) Take 5,000 mg by mouth Daily.     • glimepiride (AMARYL) 4 MG tablet Take 4 mg by mouth Every Morning Before Breakfast.  5   • Linagliptin-Metformin HCl (JENTADUETO) 2.5-1000 MG tablet Take 1 tablet by mouth 2 (Two) Times a Day. 60 tablet    • lisinopril (PRINIVIL,ZESTRIL) 20 MG tablet TAKE 1 TABLET BY MOUTH EVERY DAY 90 tablet 2   • nebivolol (BYSTOLIC) 10 MG tablet Take 1 tablet by mouth Daily. 90 tablet 3   • Omega-3 Fatty Acids (FISH OIL) 1000 MG capsule capsule Take 1,000 mg by mouth Daily With Breakfast.     • tamsulosin (FLOMAX) 0.4 MG capsule 24 hr capsule Take 0.8 mg by mouth Daily.     • [DISCONTINUED] BD PEN NEEDLE SANDRA U/F 32G X 4 MM misc USE ONCE DAILY UTD  5   • [DISCONTINUED] LANTUS SOLOSTAR 100 UNIT/ML injection pen Inject 12 Units under the skin Every Night.  5     No current facility-administered medications on file prior to visit.        Social History     Social History   • Marital status:      Spouse name: Lucy Cox   • Number of children: N/A   • Years of education: N/A     Occupational History   • Psychotherapist/ Other     [employer]     Social History Main Topics   • Smoking status: Former Smoker     Packs/day: 1.00     Years: 20.00     Types: Cigarettes     Start date: 1966     Quit date: 1986   • Smokeless tobacco: Never Used      Comment: Caffeine use 3 cups daily   • Alcohol use No      Comment: sober for 32 years   • Drug use: No      Comment: sober since 35 h/o iv drug use   • Sexual activity: Defer     Other Topics Concern   • Not on file     Social History Narrative   • No narrative on file           Review of Systems   Constitution: Negative.   HENT: Negative for congestion.    Eyes: Negative for vision loss in left eye and vision loss in right eye.   Respiratory: Negative.  Negative for cough, hemoptysis, shortness of breath, sleep disturbances due to breathing, snoring, sputum production and wheezing.    Endocrine:  "Negative.    Hematologic/Lymphatic: Negative.    Skin: Negative for poor wound healing and rash.   Musculoskeletal: Negative for falls, gout, muscle cramps and myalgias.   Gastrointestinal: Negative for abdominal pain, diarrhea, dysphagia, hematemesis, melena, nausea and vomiting.   Neurological: Negative for excessive daytime sleepiness, dizziness, headaches, light-headedness, loss of balance, seizures and vertigo.   Psychiatric/Behavioral: Negative for depression and substance abuse. The patient is not nervous/anxious.        Procedures    ECG 12 Lead  Date/Time: 6/20/2018 11:16 AM  Performed by: JOSE CHANG  Authorized by: JOSE CHANG   Comparison: compared with previous ECG   Similar to previous ECG  Rhythm: sinus rhythm  Conduction: LAFB  Clinical impression: abnormal ECG                Objective:      Vitals:    06/20/18 1054   BP: 124/82   BP Location: Right arm   Patient Position: Sitting   Pulse: 72   Weight: 97.8 kg (215 lb 9.6 oz)   Height: 182.9 cm (72.01\")     Body mass index is 29.23 kg/m².    Physical Exam   Constitutional: He is oriented to person, place, and time. He appears well-developed and well-nourished. No distress.   HENT:   Head: Normocephalic and atraumatic.   Eyes: Conjunctivae are normal. No scleral icterus.   Neck: Neck supple. No JVD present. Carotid bruit is not present. No thyromegaly present.   Cardiovascular: Normal rate, regular rhythm, S1 normal, S2 normal, normal heart sounds and intact distal pulses.   No extrasystoles are present. PMI is not displaced.  Exam reveals no gallop.    No murmur heard.  Pulses:       Carotid pulses are 2+ on the right side, and 2+ on the left side.       Radial pulses are 2+ on the right side, and 2+ on the left side.        Dorsalis pedis pulses are 2+ on the right side, and 2+ on the left side.        Posterior tibial pulses are 2+ on the right side, and 2+ on the left side.   Pulmonary/Chest: Effort normal and breath sounds " normal. No respiratory distress. He has no wheezes. He has no rhonchi. He has no rales. He exhibits no tenderness.   Abdominal: Soft. Bowel sounds are normal. He exhibits no distension, no abdominal bruit and no mass. There is no tenderness.   Musculoskeletal: He exhibits no edema or deformity.   Lymphadenopathy:     He has no cervical adenopathy.   Neurological: He is alert and oriented to person, place, and time. No cranial nerve deficit.   Skin: Skin is warm and dry. No rash noted. He is not diaphoretic. No cyanosis. No pallor. Nails show no clubbing.   Psychiatric: He has a normal mood and affect. Judgment normal.   Vitals reviewed.      Lab Review:       Assessment:      Diagnosis Plan   1. Essential hypertension     2. Type 2 diabetes mellitus without complication, without long-term current use of insulin     3. Hyperlipidemia, unspecified hyperlipidemia type        1. Normal stress perfusion study 8/2012 and 1/2015.  Abnormal stress echocardiogram done 2/2018, very mild CAD on cath 5/2/18  2. Abnormal ECG. Left anterior fascicular block which is chronic.  3. Hypertension, labile.   4. Diabetes mellitus type 2. Stable.  5. Obstructive sleep apnea on CPAP. Getting retested for this.  6. Hyperlipidemia. On atorvastatin.   7. Hepatitis C, stable.  8. History of rheumatoid fever, stable. No valvular heart problems.   9. History of gout.  10. Plaquing of the left carotid artery.       Plan:       See back in 6 months, no changes.

## 2018-06-27 ENCOUNTER — TELEPHONE (OUTPATIENT)
Dept: FAMILY MEDICINE CLINIC | Facility: CLINIC | Age: 68
End: 2018-06-27

## 2018-06-27 RX ORDER — METHYLPREDNISOLONE 4 MG/1
TABLET ORAL
Qty: 21 TABLET | Refills: 0 | Status: SHIPPED | OUTPATIENT
Start: 2018-06-27 | End: 2018-08-10

## 2018-06-27 NOTE — TELEPHONE ENCOUNTER
Pt is having a gout flare and would like to have colcrys .6mg sent to his [facility] pharmacy.

## 2018-06-27 NOTE — TELEPHONE ENCOUNTER
colcrys interacts with his lipitor and it isn't as effective for gout as a steroid pack.  I sent a steroid pack to pharmacy for him

## 2018-07-20 RX ORDER — BUPROPION HYDROCHLORIDE 150 MG/1
150 TABLET ORAL EVERY MORNING
Qty: 90 TABLET | Refills: 0 | Status: SHIPPED | OUTPATIENT
Start: 2018-07-20 | End: 2018-08-10

## 2018-07-24 DIAGNOSIS — E78.5 HYPERLIPIDEMIA, UNSPECIFIED HYPERLIPIDEMIA TYPE: ICD-10-CM

## 2018-07-24 DIAGNOSIS — E11.9 TYPE 2 DIABETES MELLITUS WITHOUT COMPLICATION, WITHOUT LONG-TERM CURRENT USE OF INSULIN (HCC): Primary | ICD-10-CM

## 2018-08-02 ENCOUNTER — TELEPHONE (OUTPATIENT)
Dept: FAMILY MEDICINE CLINIC | Facility: CLINIC | Age: 68
End: 2018-08-02

## 2018-08-02 NOTE — TELEPHONE ENCOUNTER
Pt called and said over a month ago he came in because his foot swelled up and you prescribed him a steroid that worked wonders for his foot. He is having a flare up and was wondering if you could write him another one before it gets worst.

## 2018-08-03 NOTE — TELEPHONE ENCOUNTER
On 6/27/18 I sent in medrol dosepak for what he said was gout.  What are his sx's and what joint is it?  Not great idea to take steroids back to back due to additive side effects of steroids.

## 2018-08-03 NOTE — TELEPHONE ENCOUNTER
Pt said hes not for sure if its gout again or not. He said that the pain is on his left foot and its the bone in the center on the left side. He also has been dealing with a messed up tendon on that foot to. He started PT last month his last visit was a week and a half ago. His foot has gotten worse and he just needs to know what he can do if its to soon for another steroid brendan. He said the last brendan helped wonders.

## 2018-08-06 NOTE — TELEPHONE ENCOUNTER
Is he seeing an orthopedic or podiatrist?  If so, he needs to talk to them about further treatment options.  If he isn't seeing someone for his foot, I can make him a referral to podiatry

## 2018-08-07 DIAGNOSIS — M79.672 LEFT FOOT PAIN: Primary | ICD-10-CM

## 2018-08-07 LAB
ALBUMIN SERPL-MCNC: 5.1 G/DL (ref 3.5–5.2)
ALBUMIN/GLOB SERPL: 2.2 G/DL
ALP SERPL-CCNC: 109 U/L (ref 39–117)
ALT SERPL-CCNC: 24 U/L (ref 1–41)
AST SERPL-CCNC: 11 U/L (ref 1–40)
BILIRUB SERPL-MCNC: 0.4 MG/DL (ref 0.1–1.2)
BUN SERPL-MCNC: 28 MG/DL (ref 8–23)
BUN/CREAT SERPL: 28.3 (ref 7–25)
CALCIUM SERPL-MCNC: 9.7 MG/DL (ref 8.6–10.5)
CHLORIDE SERPL-SCNC: 94 MMOL/L (ref 98–107)
CHOLEST SERPL-MCNC: 177 MG/DL (ref 0–200)
CO2 SERPL-SCNC: 27.2 MMOL/L (ref 22–29)
CREAT SERPL-MCNC: 0.99 MG/DL (ref 0.76–1.27)
GLOBULIN SER CALC-MCNC: 2.3 GM/DL
GLUCOSE SERPL-MCNC: 160 MG/DL (ref 65–99)
HBA1C MFR BLD: 6.7 % (ref 4.8–5.6)
HDLC SERPL-MCNC: 53 MG/DL (ref 40–60)
LDLC SERPL CALC-MCNC: 82 MG/DL (ref 0–100)
LDLC/HDLC SERPL: 1.55 {RATIO}
POTASSIUM SERPL-SCNC: 4.9 MMOL/L (ref 3.5–5.2)
PROT SERPL-MCNC: 7.4 G/DL (ref 6–8.5)
SODIUM SERPL-SCNC: 135 MMOL/L (ref 136–145)
TRIGL SERPL-MCNC: 208 MG/DL (ref 0–150)
VLDLC SERPL CALC-MCNC: 41.6 MG/DL (ref 5–40)

## 2018-08-10 ENCOUNTER — OFFICE VISIT (OUTPATIENT)
Dept: FAMILY MEDICINE CLINIC | Facility: CLINIC | Age: 68
End: 2018-08-10

## 2018-08-10 VITALS
DIASTOLIC BLOOD PRESSURE: 72 MMHG | SYSTOLIC BLOOD PRESSURE: 120 MMHG | HEART RATE: 70 BPM | HEIGHT: 72 IN | OXYGEN SATURATION: 96 % | WEIGHT: 207.3 LBS | RESPIRATION RATE: 16 BRPM | BODY MASS INDEX: 28.08 KG/M2

## 2018-08-10 DIAGNOSIS — E78.5 HYPERLIPIDEMIA, UNSPECIFIED HYPERLIPIDEMIA TYPE: Primary | ICD-10-CM

## 2018-08-10 DIAGNOSIS — E11.9 TYPE 2 DIABETES MELLITUS WITHOUT COMPLICATION, WITHOUT LONG-TERM CURRENT USE OF INSULIN (HCC): ICD-10-CM

## 2018-08-10 DIAGNOSIS — R79.89 ELEVATED LFTS: ICD-10-CM

## 2018-08-10 DIAGNOSIS — I10 ESSENTIAL HYPERTENSION: ICD-10-CM

## 2018-08-10 DIAGNOSIS — F32.A DEPRESSION, UNSPECIFIED DEPRESSION TYPE: ICD-10-CM

## 2018-08-10 PROCEDURE — 99214 OFFICE O/P EST MOD 30 MIN: CPT | Performed by: INTERNAL MEDICINE

## 2018-08-10 RX ORDER — ATORVASTATIN CALCIUM 20 MG/1
20 TABLET, FILM COATED ORAL DAILY
Qty: 90 TABLET | Refills: 1 | Status: SHIPPED | OUTPATIENT
Start: 2018-08-10 | End: 2019-02-15 | Stop reason: SDUPTHER

## 2018-08-10 NOTE — PROGRESS NOTES
Subjective   Mike Cox is a 67 y.o. male who comes in today for   Chief Complaint   Patient presents with   • Hyperlipidemia   • Hypertension   .    History of Present Illness   Here for f/u on NIDDM and had a recent heart cath due to elevated blood pressure and stress echo that showed hyperdynamic LV and cath looked good overall.  Medical management only for mild plaque.  On lipitor for HL.  Hasn't smoked in over 30 years.  Went on paleo diet 8 weeks ago and he got off his insulin and decreased his amaryl from bid to qd.  FBS running 111 and his high is 150.  a1c is improved.  He is eating more red meat however.  Got hearing aids yesterday. Left lateral foot with a swelling in it for 3 months.  Steroid pack really helped but pain and swelling returned.  No trauma.  Has apt  next week.  Grass fed beef, chicken, avacado, butter, olive oil and coconut oil are his source of fat.   BP running 115-120/70-80's  Has stable hemorrhoids  4/2/2018 cscope with Dr. Ornelas and repeat in 10 years.   Eye exam summer 2018 and negative.     The following portions of the patient's history were reviewed and updated as appropriate: allergies, current medications, past family history, past medical history, past social history, past surgical history and problem list.    Review of Systems   Constitutional: Negative for unexpected weight change (intentional weight loss since april).   Respiratory: Negative.    Cardiovascular: Negative.    Musculoskeletal: Positive for arthralgias.   Psychiatric/Behavioral: Negative.        Vitals:    08/10/18 1043   BP: 120/72   Pulse: 70   Resp: 16   SpO2: 96%       Objective   Physical Exam   Constitutional: He is oriented to person, place, and time. He appears well-developed and well-nourished.   HENT:   Head: Normocephalic and atraumatic.   Right Ear: External ear normal.   Left Ear: External ear normal.   Mouth/Throat: Oropharynx is clear and moist.   Eyes: Conjunctivae are normal.    Neck: Neck supple.   Cardiovascular: Normal rate, regular rhythm and normal heart sounds.    No bruits   Pulmonary/Chest: Effort normal and breath sounds normal. No respiratory distress. He has no wheezes. He has no rales.   Abdominal: Soft. Bowel sounds are normal. He exhibits no distension and no mass. There is no tenderness.   Lymphadenopathy:     He has no cervical adenopathy.   Neurological: He is alert and oriented to person, place, and time.   Skin: Skin is warm.   Psychiatric: He has a normal mood and affect. His behavior is normal. Judgment and thought content normal.   Nursing note and vitals reviewed.      Assessment/Plan   Mike was seen today for hyperlipidemia and hypertension.    Diagnoses and all orders for this visit:    Hyperlipidemia, unspecified hyperlipidemia type    Essential hypertension    Type 2 diabetes mellitus without complication, without long-term current use of insulin (CMS/[facility])    Depression, unspecified depression type    Elevated LFTs    Other orders  -     atorvastatin (LIPITOR) 20 MG tablet; Take 1 tablet by mouth Daily.  -     SCANNED - FOOT EXAM    foot exam normal other than a oval well circumscribed bulge at base of 5th MT on the left foot.  Non tender and movement appears normal  Refill lipitor.  Labs reviewed and show improved a1c but it is still above my goal for him.  Think this diet is helping his sugars but prob increasing LDL and TG.  We talked about lowering the saturated fat content of the red meat.    Continue wellbutrin for depression which is well controlled   Continue bystolic and lisinopril for HTN which is also well controlled with recent weight loss and exercising  NIDDM followed by Dr. Ireland and a1c is improving  Get eye exam records for chart               I have asked for the patient to return to clinic in 6month(s).

## 2018-09-06 ENCOUNTER — TELEPHONE (OUTPATIENT)
Dept: FAMILY MEDICINE CLINIC | Facility: CLINIC | Age: 68
End: 2018-09-06

## 2018-09-06 NOTE — TELEPHONE ENCOUNTER
Patient would like a prescription for proctozone 2.5%. He is taking this for hemorrhoids when they flare up. A previous doctor gave this to him in the past and he would ask for a refill through them but prefers not to go back and see him.    shauna roberts

## 2018-10-18 RX ORDER — BUPROPION HYDROCHLORIDE 150 MG/1
150 TABLET ORAL EVERY MORNING
Qty: 90 TABLET | Refills: 1 | Status: SHIPPED | OUTPATIENT
Start: 2018-10-18 | End: 2019-02-15

## 2018-12-07 ENCOUNTER — TELEPHONE (OUTPATIENT)
Dept: CARDIOLOGY | Facility: CLINIC | Age: 68
End: 2018-12-07

## 2018-12-07 NOTE — TELEPHONE ENCOUNTER
Pt is calling for clearance for foot surgery with  on 1/3. His last ov was 6/18. Is he cleared for surgery?

## 2019-01-02 ENCOUNTER — TRANSCRIBE ORDERS (OUTPATIENT)
Dept: ADMINISTRATIVE | Facility: HOSPITAL | Age: 69
End: 2019-01-02

## 2019-01-02 ENCOUNTER — HOSPITAL ENCOUNTER (OUTPATIENT)
Dept: CARDIOLOGY | Facility: HOSPITAL | Age: 69
Discharge: HOME | End: 2019-01-02
Attending: ORTHOPAEDIC SURGERY | Admitting: ORTHOPAEDIC SURGERY

## 2019-01-02 ENCOUNTER — LAB (OUTPATIENT)
Dept: LAB | Facility: HOSPITAL | Age: 69
End: 2019-01-02
Attending: ORTHOPAEDIC SURGERY

## 2019-01-02 DIAGNOSIS — M79.672 FOOT PAIN, LEFT: Primary | ICD-10-CM

## 2019-01-02 DIAGNOSIS — M79.672 FOOT PAIN, LEFT: ICD-10-CM

## 2019-01-02 LAB
ALBUMIN SERPL-MCNC: 4.9 G/DL (ref 3.5–5.2)
ALBUMIN/GLOB SERPL: 1.8 G/DL
ALP SERPL-CCNC: 102 U/L (ref 39–117)
ALT SERPL W P-5'-P-CCNC: 24 U/L (ref 1–41)
ANION GAP SERPL CALCULATED.3IONS-SCNC: 14.4 MMOL/L
AST SERPL-CCNC: 23 U/L (ref 1–40)
BASOPHILS # BLD AUTO: 0.05 10*3/MM3 (ref 0–0.2)
BASOPHILS NFR BLD AUTO: 0.5 % (ref 0–1.5)
BILIRUB SERPL-MCNC: 0.7 MG/DL (ref 0.1–1.2)
BUN BLD-MCNC: 14 MG/DL (ref 8–23)
BUN/CREAT SERPL: 15.4 (ref 7–25)
CALCIUM SPEC-SCNC: 9.7 MG/DL (ref 8.6–10.5)
CHLORIDE SERPL-SCNC: 97 MMOL/L (ref 98–107)
CO2 SERPL-SCNC: 26.6 MMOL/L (ref 22–29)
CREAT BLD-MCNC: 0.91 MG/DL (ref 0.76–1.27)
DEPRECATED RDW RBC AUTO: 39.8 FL (ref 37–54)
EOSINOPHIL # BLD AUTO: 0.18 10*3/MM3 (ref 0–0.7)
EOSINOPHIL NFR BLD AUTO: 2 % (ref 0.3–6.2)
ERYTHROCYTE [DISTWIDTH] IN BLOOD BY AUTOMATED COUNT: 12.6 % (ref 11.5–14.5)
GFR SERPL CREATININE-BSD FRML MDRD: 83 ML/MIN/1.73
GLOBULIN UR ELPH-MCNC: 2.8 GM/DL
GLUCOSE BLD-MCNC: 167 MG/DL (ref 65–99)
HCT VFR BLD AUTO: 47.3 % (ref 40.4–52.2)
HGB BLD-MCNC: 16.2 G/DL (ref 13.7–17.6)
IMM GRANULOCYTES # BLD AUTO: 0.04 10*3/MM3 (ref 0–0.03)
IMM GRANULOCYTES NFR BLD AUTO: 0.4 % (ref 0–0.5)
LYMPHOCYTES # BLD AUTO: 2.17 10*3/MM3 (ref 0.9–4.8)
LYMPHOCYTES NFR BLD AUTO: 23.5 % (ref 19.6–45.3)
MCH RBC QN AUTO: 29.9 PG (ref 27–32.7)
MCHC RBC AUTO-ENTMCNC: 34.2 G/DL (ref 32.6–36.4)
MCV RBC AUTO: 87.3 FL (ref 79.8–96.2)
MONOCYTES # BLD AUTO: 0.58 10*3/MM3 (ref 0.2–1.2)
MONOCYTES NFR BLD AUTO: 6.3 % (ref 5–12)
NEUTROPHILS # BLD AUTO: 6.21 10*3/MM3 (ref 1.9–8.1)
NEUTROPHILS NFR BLD AUTO: 67.3 % (ref 42.7–76)
NRBC BLD AUTO-RTO: 0 /100 WBC (ref 0–0)
PLATELET # BLD AUTO: 214 10*3/MM3 (ref 140–500)
PMV BLD AUTO: 9.5 FL (ref 6–12)
POTASSIUM BLD-SCNC: 4.3 MMOL/L (ref 3.5–5.2)
PROT SERPL-MCNC: 7.7 G/DL (ref 6–8.5)
RBC # BLD AUTO: 5.42 10*6/MM3 (ref 4.6–6)
SODIUM BLD-SCNC: 138 MMOL/L (ref 136–145)
WBC NRBC COR # BLD: 9.23 10*3/MM3 (ref 4.5–10.7)

## 2019-01-02 PROCEDURE — 93010 ELECTROCARDIOGRAM REPORT: CPT | Performed by: INTERNAL MEDICINE

## 2019-01-02 PROCEDURE — 93005 ELECTROCARDIOGRAM TRACING: CPT | Performed by: ORTHOPAEDIC SURGERY

## 2019-01-02 PROCEDURE — 85025 COMPLETE CBC W/AUTO DIFF WBC: CPT

## 2019-01-02 PROCEDURE — 36415 COLL VENOUS BLD VENIPUNCTURE: CPT

## 2019-01-02 PROCEDURE — 80053 COMPREHEN METABOLIC PANEL: CPT

## 2019-02-15 ENCOUNTER — OFFICE VISIT (OUTPATIENT)
Dept: FAMILY MEDICINE CLINIC | Facility: CLINIC | Age: 69
End: 2019-02-15

## 2019-02-15 VITALS
DIASTOLIC BLOOD PRESSURE: 83 MMHG | WEIGHT: 211.8 LBS | SYSTOLIC BLOOD PRESSURE: 133 MMHG | TEMPERATURE: 97.8 F | BODY MASS INDEX: 28.69 KG/M2 | HEART RATE: 73 BPM | OXYGEN SATURATION: 97 %

## 2019-02-15 DIAGNOSIS — Z12.11 ENCOUNTER FOR COLORECTAL CANCER SCREENING: ICD-10-CM

## 2019-02-15 DIAGNOSIS — E78.5 HYPERLIPIDEMIA, UNSPECIFIED HYPERLIPIDEMIA TYPE: Primary | ICD-10-CM

## 2019-02-15 DIAGNOSIS — I10 ESSENTIAL HYPERTENSION: ICD-10-CM

## 2019-02-15 DIAGNOSIS — Z12.12 ENCOUNTER FOR COLORECTAL CANCER SCREENING: ICD-10-CM

## 2019-02-15 PROCEDURE — 99214 OFFICE O/P EST MOD 30 MIN: CPT | Performed by: INTERNAL MEDICINE

## 2019-02-15 RX ORDER — LISINOPRIL 20 MG/1
20 TABLET ORAL DAILY
Qty: 90 TABLET | Refills: 2 | Status: SHIPPED | OUTPATIENT
Start: 2019-02-15 | End: 2019-10-31 | Stop reason: SDUPTHER

## 2019-02-15 RX ORDER — ATORVASTATIN CALCIUM 20 MG/1
20 TABLET, FILM COATED ORAL DAILY
Qty: 90 TABLET | Refills: 1 | Status: SHIPPED | OUTPATIENT
Start: 2019-02-15 | End: 2019-05-19 | Stop reason: SDUPTHER

## 2019-02-15 NOTE — PROGRESS NOTES
Subjective   Mike Cox is a 68 y.o. male who comes in today for   Chief Complaint   Patient presents with   • Diabetes     check up having foot surgery next 3-5 months bone in foot off insulin now   .    History of Present Illness   Here for f/u on NIDDM. Had heart cath in May 2018 and was negative.  Dr. Flores is happy with results.  Eating on paleo diet and we are watching cholesterol  Takes 20 mg lipitor daily plus a baby asa/day.  Vit D3 for VDD and sees Dr. Ireland for NIDDM.  A1c running well with the paleo and is off the insulin.  Lisinopril for HTN.  Sees urologist for prostate checks Dr. Deleon for yearly PSA.  He is close to time for cscope and used Dr. Ornelas.  The following portions of the patient's history were reviewed and updated as appropriate: allergies, current medications, past family history, past medical history, past social history, past surgical history and problem list.    Review of Systems   Constitutional: Negative.    Gastrointestinal: Negative.    Psychiatric/Behavioral: Negative.        Vitals:    02/15/19 1301   BP: 133/83   Pulse: 73   Temp: 97.8 °F (36.6 °C)   SpO2: 97%       Objective   Physical Exam   Constitutional: He is oriented to person, place, and time. He appears well-developed and well-nourished.   HENT:   Head: Normocephalic and atraumatic.   Right Ear: External ear normal.   Left Ear: External ear normal.   Mouth/Throat: Oropharynx is clear and moist.   Eyes: Conjunctivae are normal.   Neck: Neck supple.   Cardiovascular: Normal rate, regular rhythm and normal heart sounds.   No bruits   Pulmonary/Chest: Effort normal and breath sounds normal. No respiratory distress. He has no wheezes. He has no rales.   Abdominal: Soft. Bowel sounds are normal. He exhibits no distension and no mass. There is no tenderness.   Lymphadenopathy:     He has no cervical adenopathy.   Neurological: He is alert and oriented to person, place, and time.   Skin: Skin is warm.    Psychiatric: He has a normal mood and affect. His behavior is normal. Judgment and thought content normal.   Nursing note and vitals reviewed.        Current Outpatient Medications:   •  ACCU-CHEK FASTCLIX LANCETS misc, TEST TID, Disp: , Rfl: 5  •  ACCU-CHEK SMARTVIEW test strip, USE TO TEST BLOOD SUGAR BID, Disp: , Rfl: 6  •  aspirin (ASPIRIN ADULT LOW STRENGTH) 81 MG EC tablet, Take 1 tablet by mouth Daily., Disp: , Rfl:   •  atorvastatin (LIPITOR) 20 MG tablet, Take 1 tablet by mouth Daily., Disp: 90 tablet, Rfl: 1  •  cholecalciferol (VITAMIN D3) 1000 units tablet, Take 2,000 Units by mouth Daily., Disp: , Rfl:   •  Cyanocobalamin (VITAMIN B-12 PO), Take 5,000 mg by mouth Daily., Disp: , Rfl:   •  glimepiride (AMARYL) 4 MG tablet, Take 2 mg by mouth Every Morning Before Breakfast., Disp: , Rfl: 5  •  hydrocortisone (PROCTOZONE-HC) 2.5 % rectal cream, Insert  into the rectum Daily. AS NEEDED FOR HEMORRHOIDS, Disp: 28.35 g, Rfl: 0  •  Linagliptin-Metformin HCl (JENTADUETO) 2.5-1000 MG tablet, Take 1 tablet by mouth 2 (Two) Times a Day., Disp: 60 tablet, Rfl:   •  lisinopril (PRINIVIL,ZESTRIL) 20 MG tablet, Take 1 tablet by mouth Daily., Disp: 90 tablet, Rfl: 2  •  nebivolol (BYSTOLIC) 10 MG tablet, Take 1 tablet by mouth Daily., Disp: 90 tablet, Rfl: 3  •  Omega-3 Fatty Acids (FISH OIL) 1000 MG capsule capsule, Take 1,000 mg by mouth Daily With Breakfast., Disp: , Rfl:   •  PROCTOZONE-HC 2.5 % rectal cream, INSERT RECTALLY TWICE DAILY, Disp: 30 g, Rfl: 0  •  tamsulosin (FLOMAX) 0.4 MG capsule 24 hr capsule, Take 0.8 mg by mouth Daily., Disp: , Rfl:   •  triamcinolone (KENALOG) 0.025 % cream, APPLY TO AFFECTED AREA OF ARM BID PRN, Disp: , Rfl: 1    Assessment/Plan   Mike was seen today for diabetes.    Diagnoses and all orders for this visit:    Hyperlipidemia, unspecified hyperlipidemia type  -     Lipid Panel With LDL / HDL Ratio; Future  -     Comprehensive Metabolic Panel; Future    Essential  hypertension  -     Lipid Panel With LDL / HDL Ratio; Future  -     Comprehensive Metabolic Panel; Future    Encounter for colorectal cancer screening  -     Ambulatory Referral to General Surgery    Other orders  -     lisinopril (PRINIVIL,ZESTRIL) 20 MG tablet; Take 1 tablet by mouth Daily.  -     atorvastatin (LIPITOR) 20 MG tablet; Take 1 tablet by mouth Daily.    refill lisinopril for HTN and lipitor for HL  Fasting labs ordered  cscope referral with Dr. Ornelas  F/u uro for PSA and yearly exam  Endo following diabetes                 I have asked for the patient to return to clinic in 7month(s).

## 2019-02-22 ENCOUNTER — RESULTS ENCOUNTER (OUTPATIENT)
Dept: FAMILY MEDICINE CLINIC | Facility: CLINIC | Age: 69
End: 2019-02-22

## 2019-02-22 DIAGNOSIS — E78.5 HYPERLIPIDEMIA, UNSPECIFIED HYPERLIPIDEMIA TYPE: ICD-10-CM

## 2019-02-22 DIAGNOSIS — I10 ESSENTIAL HYPERTENSION: ICD-10-CM

## 2019-03-25 RX ORDER — NEBIVOLOL HYDROCHLORIDE 10 MG/1
TABLET ORAL
Qty: 90 TABLET | Refills: 0 | Status: SHIPPED | OUTPATIENT
Start: 2019-03-25 | End: 2019-07-10 | Stop reason: SDUPTHER

## 2019-05-20 RX ORDER — ATORVASTATIN CALCIUM 20 MG/1
20 TABLET, FILM COATED ORAL DAILY
Qty: 90 TABLET | Refills: 0 | Status: SHIPPED | OUTPATIENT
Start: 2019-05-20 | End: 2019-12-17 | Stop reason: SDUPTHER

## 2019-05-21 RX ORDER — BUPROPION HYDROCHLORIDE 150 MG/1
150 TABLET ORAL EVERY MORNING
Qty: 90 TABLET | Refills: 0 | OUTPATIENT
Start: 2019-05-21

## 2019-06-03 ENCOUNTER — APPOINTMENT (OUTPATIENT)
Dept: PREADMISSION TESTING | Facility: HOSPITAL | Age: 69
End: 2019-06-03

## 2019-06-03 VITALS
WEIGHT: 214.3 LBS | HEART RATE: 75 BPM | OXYGEN SATURATION: 97 % | RESPIRATION RATE: 16 BRPM | DIASTOLIC BLOOD PRESSURE: 84 MMHG | HEIGHT: 70 IN | TEMPERATURE: 97.7 F | BODY MASS INDEX: 30.68 KG/M2 | SYSTOLIC BLOOD PRESSURE: 132 MMHG

## 2019-06-03 LAB
ALBUMIN SERPL-MCNC: 3.8 G/DL (ref 3.5–5.2)
ALBUMIN/GLOB SERPL: 1.2 G/DL
ALP SERPL-CCNC: 122 U/L (ref 39–117)
ALT SERPL W P-5'-P-CCNC: 16 U/L (ref 1–41)
ANION GAP SERPL CALCULATED.3IONS-SCNC: 13.5 MMOL/L
AST SERPL-CCNC: 19 U/L (ref 1–40)
BASOPHILS # BLD AUTO: 0.08 10*3/MM3 (ref 0–0.2)
BASOPHILS NFR BLD AUTO: 0.6 % (ref 0–1.5)
BILIRUB SERPL-MCNC: 0.5 MG/DL (ref 0.2–1.2)
BUN BLD-MCNC: 14 MG/DL (ref 8–23)
BUN/CREAT SERPL: 16.7 (ref 7–25)
CALCIUM SPEC-SCNC: 9.2 MG/DL (ref 8.6–10.5)
CHLORIDE SERPL-SCNC: 95 MMOL/L (ref 98–107)
CO2 SERPL-SCNC: 24.5 MMOL/L (ref 22–29)
CREAT BLD-MCNC: 0.84 MG/DL (ref 0.76–1.27)
DEPRECATED RDW RBC AUTO: 41.2 FL (ref 37–54)
EOSINOPHIL # BLD AUTO: 0.54 10*3/MM3 (ref 0–0.4)
EOSINOPHIL NFR BLD AUTO: 4.1 % (ref 0.3–6.2)
ERYTHROCYTE [DISTWIDTH] IN BLOOD BY AUTOMATED COUNT: 12.4 % (ref 12.3–15.4)
GFR SERPL CREATININE-BSD FRML MDRD: 91 ML/MIN/1.73
GLOBULIN UR ELPH-MCNC: 3.1 GM/DL
GLUCOSE BLD-MCNC: 156 MG/DL (ref 65–99)
HCT VFR BLD AUTO: 42.8 % (ref 37.5–51)
HGB BLD-MCNC: 14.2 G/DL (ref 13–17.7)
IMM GRANULOCYTES # BLD AUTO: 0.13 10*3/MM3 (ref 0–0.05)
IMM GRANULOCYTES NFR BLD AUTO: 1 % (ref 0–0.5)
LYMPHOCYTES # BLD AUTO: 2.86 10*3/MM3 (ref 0.7–3.1)
LYMPHOCYTES NFR BLD AUTO: 21.5 % (ref 19.6–45.3)
MCH RBC QN AUTO: 30.1 PG (ref 26.6–33)
MCHC RBC AUTO-ENTMCNC: 33.2 G/DL (ref 31.5–35.7)
MCV RBC AUTO: 90.7 FL (ref 79–97)
MONOCYTES # BLD AUTO: 1.13 10*3/MM3 (ref 0.1–0.9)
MONOCYTES NFR BLD AUTO: 8.5 % (ref 5–12)
NEUTROPHILS # BLD AUTO: 8.55 10*3/MM3 (ref 1.7–7)
NEUTROPHILS NFR BLD AUTO: 64.3 % (ref 42.7–76)
NRBC BLD AUTO-RTO: 0 /100 WBC (ref 0–0.2)
PLATELET # BLD AUTO: 221 10*3/MM3 (ref 140–450)
PMV BLD AUTO: 9.9 FL (ref 6–12)
POTASSIUM BLD-SCNC: 4.4 MMOL/L (ref 3.5–5.2)
PROT SERPL-MCNC: 6.9 G/DL (ref 6–8.5)
RBC # BLD AUTO: 4.72 10*6/MM3 (ref 4.14–5.8)
SODIUM BLD-SCNC: 133 MMOL/L (ref 136–145)
WBC NRBC COR # BLD: 13.29 10*3/MM3 (ref 3.4–10.8)

## 2019-06-03 PROCEDURE — 80053 COMPREHEN METABOLIC PANEL: CPT | Performed by: ORTHOPAEDIC SURGERY

## 2019-06-03 PROCEDURE — 85025 COMPLETE CBC W/AUTO DIFF WBC: CPT | Performed by: ORTHOPAEDIC SURGERY

## 2019-06-03 PROCEDURE — 36415 COLL VENOUS BLD VENIPUNCTURE: CPT

## 2019-06-03 RX ORDER — TRIAMCINOLONE ACETONIDE 0.25 MG/G
1 CREAM TOPICAL AS NEEDED
COMMUNITY
End: 2020-03-10

## 2019-06-03 RX ORDER — NEBIVOLOL 10 MG/1
10 TABLET ORAL DAILY
COMMUNITY
End: 2019-07-10 | Stop reason: SDUPTHER

## 2019-06-03 NOTE — DISCHARGE INSTRUCTIONS
Take the following medications the morning of surgery with a small sip of water:    BYSTOLIC    General Instructions:  • Do not eat solid food after midnight the night before surgery.  • You may drink clear liquids day of surgery but must stop at least one hour before your hospital arrival time.  • It is beneficial for you to have a clear drink that contains carbohydrates the day of surgery.  We suggest a 12 to 20 ounce bottle of Gatorade or Powerade for non-diabetic patients or a 12 to 20 ounce bottle of G2 or Powerade Zero for diabetic patients. (Pediatric patients, are not advised to drink a 12 to 20 ounce carbohydrate drink)    Clear liquids are liquids you can see through.  Nothing red in color.     Plain water                               Sports drinks  Sodas                                   Gelatin (Jell-O)  Fruit juices without pulp such as white grape juice and apple juice  Popsicles that contain no fruit or yogurt  Tea or coffee (no cream or milk added)  Gatorade / Powerade  G2 / Powerade Zero    • Infants may have breast milk up to four hours before surgery.  • Infants drinking formula may drink formula up to six hours before surgery.   • Patients who avoid smoking, chewing tobacco and alcohol for 4 weeks prior to surgery have a reduced risk of post-operative complications.  Quit smoking as many days before surgery as you can.  • Do not smoke, use chewing tobacco or drink alcohol the day of surgery.   • If applicable bring your C-PAP/ BI-PAP machine.  • Bring any papers given to you in the doctor’s office.  • Wear clean comfortable clothes and socks.  • Do not wear contact lenses, false eyelashes or make-up.  Bring a case for your glasses.   • Bring crutches or walker if applicable.  • Remove all piercings.  Leave jewelry and any other valuables at home.  • Hair extensions with metal clips must be removed prior to surgery.  • The Pre-Admission Testing nurse will instruct you to bring medications if  unable to obtain an accurate list in Pre-Admission Testing.        If you were given a blood bank ID arm band remember to bring it with you the day of surgery.    Preventing a Surgical Site Infection:  • For 2 to 3 days before surgery, avoid shaving with a razor because the razor can irritate skin and make it easier to develop an infection.    • Any areas of open skin can increase the risk of a post-operative wound infection by allowing bacteria to enter and travel throughout the body.  Notify your surgeon if you have any skin wounds / rashes even if it is not near the expected surgical site.  The area will need assessed to determine if surgery should be delayed until it is healed.  • The night prior to surgery sleep in a clean bed with clean clothing.  Do not allow pets to sleep with you.  • Shower on the morning of surgery using a fresh bar of anti-bacterial soap (such as Dial) and clean washcloth.  Dry with a clean towel and dress in clean clothing.  • Ask your surgeon if you will be receiving antibiotics prior to surgery.  • Make sure you, your family, and all healthcare providers clean their hands with soap and water or an alcohol based hand  before caring for you or your wound.    Day of surgery:  Upon arrival, a Pre-op nurse and Anesthesiologist will review your health history, obtain vital signs, and answer questions you may have.  The only belongings needed at this time will be a list of your home medications and if applicable your C-PAP/BI-PAP machine.  If you are staying overnight your family can leave the rest of your belongings in the car and bring them to your room later.  A Pre-op nurse will start an IV and you may receive medication in preparation for surgery, including something to help you relax.  Your family will be able to see you in the Pre-op area.  While you are in surgery your family should notify the waiting room  if they leave the waiting room area and provide a contact  phone number.    Please be aware that surgery does come with discomfort.  We want to make every effort to control your discomfort so please discuss any uncontrolled symptoms with your nurse.   Your doctor will most likely have prescribed pain medications.      If you are going home after surgery you will receive individualized written care instructions before being discharged.  A responsible adult must drive you to and from the hospital on the day of your surgery and stay with you for 24 hours.    If you are staying overnight following surgery, you will be transported to your hospital room following the recovery period.  [facility] has all private rooms.    You have received a list of surgical assistants for your reference.  If you have any questions please call Pre-Admission Testing at [phone].  Deductibles and co-payments are collected on the day of service. Please be prepared to pay the required co-pay, deductible or deposit on the day of service as defined by your plan.

## 2019-06-04 ENCOUNTER — LAB (OUTPATIENT)
Dept: LAB | Facility: HOSPITAL | Age: 69
End: 2019-06-04

## 2019-06-04 ENCOUNTER — TRANSCRIBE ORDERS (OUTPATIENT)
Dept: ADMINISTRATIVE | Facility: HOSPITAL | Age: 69
End: 2019-06-04

## 2019-06-04 DIAGNOSIS — E11.65 TYPE II DIABETES MELLITUS WITH HYPEROSMOLARITY, UNCONTROLLED (HCC): ICD-10-CM

## 2019-06-04 DIAGNOSIS — E78.2 MIXED HYPERLIPIDEMIA: ICD-10-CM

## 2019-06-04 DIAGNOSIS — E11.65 TYPE II DIABETES MELLITUS WITH HYPEROSMOLARITY, UNCONTROLLED (HCC): Primary | ICD-10-CM

## 2019-06-04 DIAGNOSIS — E11.00 TYPE II DIABETES MELLITUS WITH HYPEROSMOLARITY, UNCONTROLLED (HCC): Primary | ICD-10-CM

## 2019-06-04 DIAGNOSIS — E11.00 TYPE II DIABETES MELLITUS WITH HYPEROSMOLARITY, UNCONTROLLED (HCC): ICD-10-CM

## 2019-06-04 LAB
ALBUMIN SERPL-MCNC: 4 G/DL (ref 3.5–5.2)
ALBUMIN UR-MCNC: <1.2 MG/L
ALBUMIN/GLOB SERPL: 1.4 G/DL
ALP SERPL-CCNC: 127 U/L (ref 39–117)
ALT SERPL W P-5'-P-CCNC: 17 U/L (ref 1–41)
ANION GAP SERPL CALCULATED.3IONS-SCNC: 11.4 MMOL/L
AST SERPL-CCNC: 17 U/L (ref 1–40)
BILIRUB SERPL-MCNC: 0.4 MG/DL (ref 0.1–1.2)
BUN BLD-MCNC: 13 MG/DL (ref 8–23)
BUN/CREAT SERPL: 16 (ref 7–25)
CALCIUM SPEC-SCNC: 9.3 MG/DL (ref 8.6–10.5)
CHLORIDE SERPL-SCNC: 98 MMOL/L (ref 98–107)
CHOLEST SERPL-MCNC: 158 MG/DL (ref 0–200)
CO2 SERPL-SCNC: 27.6 MMOL/L (ref 22–29)
CREAT BLD-MCNC: 0.81 MG/DL (ref 0.76–1.27)
CREAT UR-MCNC: 101.5 MG/DL
GFR SERPL CREATININE-BSD FRML MDRD: 94 ML/MIN/1.73
GLOBULIN UR ELPH-MCNC: 2.9 GM/DL
GLUCOSE BLD-MCNC: 196 MG/DL (ref 65–99)
HBA1C MFR BLD: 7.4 % (ref 4.8–5.6)
HDLC SERPL-MCNC: 44 MG/DL (ref 40–60)
LDLC SERPL CALC-MCNC: 80 MG/DL (ref 0–100)
LDLC/HDLC SERPL: 1.82 {RATIO}
MICROALBUMIN/CREAT UR: NORMAL MG/G
POTASSIUM BLD-SCNC: 4.8 MMOL/L (ref 3.5–5.2)
PROT SERPL-MCNC: 6.9 G/DL (ref 6–8.5)
SODIUM BLD-SCNC: 137 MMOL/L (ref 136–145)
TRIGL SERPL-MCNC: 169 MG/DL (ref 0–150)
TSH SERPL DL<=0.05 MIU/L-ACNC: 2.98 MIU/ML (ref 0.27–4.2)
VLDLC SERPL-MCNC: 33.8 MG/DL (ref 5–40)

## 2019-06-04 PROCEDURE — 80053 COMPREHEN METABOLIC PANEL: CPT

## 2019-06-04 PROCEDURE — 83036 HEMOGLOBIN GLYCOSYLATED A1C: CPT | Performed by: INTERNAL MEDICINE

## 2019-06-04 PROCEDURE — 80061 LIPID PANEL: CPT | Performed by: INTERNAL MEDICINE

## 2019-06-04 PROCEDURE — 82043 UR ALBUMIN QUANTITATIVE: CPT | Performed by: INTERNAL MEDICINE

## 2019-06-04 PROCEDURE — 84443 ASSAY THYROID STIM HORMONE: CPT

## 2019-06-04 PROCEDURE — 82570 ASSAY OF URINE CREATININE: CPT | Performed by: INTERNAL MEDICINE

## 2019-06-07 ENCOUNTER — OFFICE VISIT (OUTPATIENT)
Dept: CARDIOLOGY | Facility: CLINIC | Age: 69
End: 2019-06-07

## 2019-06-07 DIAGNOSIS — E78.5 HYPERLIPIDEMIA, UNSPECIFIED HYPERLIPIDEMIA TYPE: ICD-10-CM

## 2019-06-07 DIAGNOSIS — I10 ESSENTIAL HYPERTENSION: Primary | ICD-10-CM

## 2019-06-07 DIAGNOSIS — I70.90 ARTERIOSCLEROTIC VASCULAR DISEASE: ICD-10-CM

## 2019-06-07 DIAGNOSIS — E11.9 TYPE 2 DIABETES MELLITUS WITHOUT COMPLICATION, WITHOUT LONG-TERM CURRENT USE OF INSULIN (HCC): ICD-10-CM

## 2019-06-07 PROBLEM — R94.39 ABNORMAL STRESS ECHO: Status: RESOLVED | Noted: 2018-04-24 | Resolved: 2019-06-07

## 2019-06-07 PROCEDURE — 99214 OFFICE O/P EST MOD 30 MIN: CPT | Performed by: INTERNAL MEDICINE

## 2019-06-07 PROCEDURE — 93000 ELECTROCARDIOGRAM COMPLETE: CPT | Performed by: INTERNAL MEDICINE

## 2019-06-07 NOTE — PROGRESS NOTES
Date of Office Visit: 2019  Encounter Provider: Isabelle Flores MD  Place of Service: [facility] CARDIOLOGY  Patient Name: Mike Cox  :1950      Patient ID:  Mike Cox is a 68 y.o. male is here for  followup for mild CAD and risk factors.         History of Present Illness    He has hypertension, hyperlipidemia, diabetes mellitus type 2, and obstructive sleep  apnea. He first presented to the office for chest pain. Because of his cardiovascular  risks, he had an exercise nuclear stress study done in  which was negative for  ischemia. He had a lot of fatigue at that time and palpitations which were treated and  got better. His Holter recording done in 2008 showed premature ventricular complexes.   He had vascular screening done on 2009 which was normal. He had a cardiac duplex   study performed on 2011 which showed moderate intimal thickening of the right   common carotid artery with mild thickening of that carotid bulb but no thickening of his stenosis.   He presented in 2008 with fatigue and had a stress nuclear perfusion study showing no evidence   of ischemia. His echocardiogram then showed an ejection fraction of 65% with grade I diastolic dysfunction  and no significant valvular heart problems.       His last echocardiogram done 2011, showed an ejection fraction of 54% with grade 1  diastolic dysfunction and no significant valvular heart problems. He had a stress nuclear 2012  at [facility] and had no ischemia.            He had a stress nuclear perfusion study done on 2015,  showing no evidence of ischemia and ejection fraction of 57%. He had a 2-D echocardiogram  with Doppler done on 2014, showing an ejection fraction of 54% and grade 1 diastolic  dysfunction. He had carotid duplex studies done on 2015, which showed moderate  intimal thickening of the right internal carotid artery and moderate plaque  in the left  internal carotid artery.       He had vascular screening done on 03/20/2016, which showed plaquing in both carotid bulbs but it was otherwise unremarkable.        The patient had vascular screening done 06/06/2017, which was normal. He had normal vascular screening done 1/2018.         He had a stress echocardiogram done 2/1/18 showing baseline ejection fraction 59% with postexercise ejection fraction of 68% with septal hypokinesis which may be secondary to bundle branch block.  There was noted to be grade 1 diastolic dysfunction and no valvular disease. This was done for dyspnea on exertion.  cardiac catheterization done 5/2/18 which showed normal left main, large dominant normal circumflex, small nondominant RCA, 10-20% proximal LAD stenosis with normal mid and distal LAD.  30-40% first diagonal stenosis.  Normal left ventricular systolic function.      He had laboratory values done 6/4/2019 showing normal CMP except blood sugar 196, alkaline phosphatase 127, TSH 2.9, hemoglobin A1c 7.4, LDL 80, HDL 44, triglycerides 169, normal CBC.   he sees Dr. Shae Ireland from endocrinology.  He has been on Jardiance in the past but this had to be stopped for some reason.  He cannot member why.  He has no chest pain or pressure with activity.  He is not waking out of sleep with chest pain or pressure.  He has no orthopnea or difficulty breathing.  He has no tachycardia, dizziness or syncope.  He is to have surgery next week with Dr. Ruby for a bony abnormality in his left foot and is on steroids right now for inflammation and pain.  This is why his hemoglobin A1c is higher and probably also why his blood pressure is little higher.  He checks his blood pressure at home and it usually 120s over 80s.    Past Medical History:   Diagnosis Date   • Arteriosclerotic vascular disease    • BPH (benign prostatic hyperplasia)    • Carotid artery disease (CMS/HCC)     RIGHT   • Colon polyp    • Diabetes mellitus (CMS/HCC)  2002    type II, pt reports A1C of 7.1 in 3/2016   • Dyspnea on exertion     with fatigue   • Gout    • Heart murmur    • Hemorrhoids    • High cholesterol    • History of hepatitis C     In remission after Interferon and Ribavirin treatment   • HSV-2 infection    • Hyperlipidemia    • Hypertension    • PAULETTE (obstructive sleep apnea)     USES BIPAP, 2018 resolved, no longer uses cpap   • Peroneal tendonitis     LEFT   • Plantar fasciitis    • Substance abuse (CMS/[facility])     H/O DRUG ABUSE, NOW SOBER( IV DRUG USE)   • Substance abuse (CMS/[facility]) 1987    H/O ALCOHOL ABUSE   • Vitamin B 12 deficiency    • Vitamin D deficiency          Past Surgical History:   Procedure Laterality Date   • CARDIAC CATHETERIZATION N/A 5/2/2018    Procedure: Coronary angiography;  Surgeon: Maik Preciado MD;  Location: [facility] CATH INVASIVE LOCATION;  Service: Cardiovascular   • CARDIAC CATHETERIZATION N/A 5/2/2018    Procedure: Left ventriculography;  Surgeon: Maik Preciado MD;  Location: [facility] CATH INVASIVE LOCATION;  Service: Cardiovascular   • COLONOSCOPY N/A 09/10/2013    Normal, repeat in 10 years-Dr. Svetlana Ornelas   • COLONOSCOPY N/A 11/17/2006    Normal, repeat in 5 years-Dr. Jackson Denise   • COLONOSCOPY N/A 08/25/2009    Normal, repeat in 5 years-Dr. Jackson Denise   • COLONOSCOPY N/A 4/2/2018    Procedure: COLONOSCOPY into cecum and ti;  Surgeon: Svetlana Ornelas MD;  Location: [facility] ENDOSCOPY;  Service: Gastroenterology   • CYST REMOVAL N/A 06/22/2015    ON FOREHEAD, EPIDERMOID/SEBACEOUS, DR. SAHIL PFEIFFER   • CYSTOSCOPY     • SHOULDER SURGERY Bilateral 1995    BONE SPURS, DR. TYRA HESS   • TENNIS ELBOW RELEASE Right 7/18/2017    Procedure: RIGHT ELBOW OPEN FLEXOR TENDON DEBRIDEMENT AND REPAIR;  Surgeon: FABIO Hess MD;  Location:  [facility];  Service:        Current Outpatient Medications on File Prior to Visit   Medication Sig Dispense Refill   • ACCU-CHEK FASTCLIX LANCETS misc TEST TID  5   • ACCU-CHEK SMARTVIEW test  strip USE TO TEST BLOOD SUGAR BID  6   • aspirin (ASPIRIN ADULT LOW STRENGTH) 81 MG EC tablet Take 1 tablet by mouth Daily.     • atorvastatin (LIPITOR) 20 MG tablet TAKE 1 TABLET BY MOUTH DAILY 90 tablet 0   • BYSTOLIC 10 MG tablet TAKE 1 TABLET BY MOUTH DAILY 90 tablet 0   • cholecalciferol (VITAMIN D3) 1000 units tablet Take 2,000 Units by mouth Daily.     • Cyanocobalamin (VITAMIN B-12 PO) Take 5,000 mg by mouth Daily.     • glimepiride (AMARYL) 4 MG tablet Take 4 mg by mouth Every Morning Before Breakfast.  5   • hydrocortisone (PROCTOZONE-HC) 2.5 % rectal cream Insert  into the rectum Daily. AS NEEDED FOR HEMORRHOIDS (Patient taking differently: Insert 1 application into the rectum Daily. AS NEEDED FOR HEMORRHOIDS) 28.35 g 0   • Linagliptin-Metformin HCl (JENTADUETO) 2.5-1000 MG tablet Take 1 tablet by mouth 2 (Two) Times a Day. 60 tablet    • lisinopril (PRINIVIL,ZESTRIL) 20 MG tablet Take 1 tablet by mouth Daily. 90 tablet 2   • nebivolol (BYSTOLIC) 10 MG tablet Take 10 mg by mouth Daily.     • Omega-3 Fatty Acids (FISH OIL) 1000 MG capsule capsule Take 1,000 mg by mouth Daily With Breakfast.     • PROCTOZONE-HC 2.5 % rectal cream INSERT RECTALLY TWICE DAILY 30 g 0   • tamsulosin (FLOMAX) 0.4 MG capsule 24 hr capsule Take 0.8 mg by mouth Daily.     • triamcinolone (KENALOG) 0.025 % cream Apply 1 application topically to the appropriate area as directed As Needed.       No current facility-administered medications on file prior to visit.        Social History     Socioeconomic History   • Marital status:      Spouse name: Lucy Cox   • Number of children: Not on file   • Years of education: Not on file   • Highest education level: Not on file   Occupational History   • Occupation: Psychotherapist/     Employer: OTHER     Comment: [employer]   Tobacco Use   • Smoking status: Former Smoker     Packs/day: 1.00     Years: 20.00     Pack years: 20.00     Types: Cigarettes     Start  date:      Last attempt to quit:      Years since quittin.4   • Smokeless tobacco: Never Used   • Tobacco comment: Caffeine use 3 cups daily   Substance and Sexual Activity   • Alcohol use: No     Comment: sober for 32 years   • Drug use: No     Comment: sober since 35 h/o iv drug use   • Sexual activity: Defer           Review of Systems   Constitution: Negative.   HENT: Negative for congestion.    Eyes: Negative for vision loss in left eye and vision loss in right eye.   Respiratory: Negative.  Negative for cough, hemoptysis, shortness of breath, sleep disturbances due to breathing, snoring, sputum production and wheezing.    Endocrine: Negative.    Hematologic/Lymphatic: Negative.    Skin: Negative for poor wound healing and rash.   Musculoskeletal: Negative for falls, gout, muscle cramps and myalgias.   Gastrointestinal: Negative for abdominal pain, diarrhea, dysphagia, hematemesis, melena, nausea and vomiting.   Neurological: Negative for excessive daytime sleepiness, dizziness, headaches, light-headedness, loss of balance, seizures and vertigo.   Psychiatric/Behavioral: Negative for depression and substance abuse. The patient is not nervous/anxious.        Procedures    ECG 12 Lead  Date/Time: 2019 11:09 AM  Performed by: Isabelle Flores MD  Authorized by: Isabelle Flores MD   Comparison: compared with previous ECG   Similar to previous ECG  Rhythm: sinus rhythm  Conduction: left anterior fascicular block    Clinical impression: abnormal EKG                Objective:    There were no vitals filed for this visit.  There is no height or weight on file to calculate BMI.    Physical Exam   Constitutional: He is oriented to person, place, and time. He appears well-developed and well-nourished. No distress.   HENT:   Head: Normocephalic and atraumatic.   Eyes: Conjunctivae are normal. No scleral icterus.   Neck: Neck supple. No JVD present. Carotid bruit is not present. No thyromegaly  present.   Cardiovascular: Normal rate, regular rhythm, S1 normal, S2 normal, normal heart sounds and intact distal pulses.  No extrasystoles are present. PMI is not displaced. Exam reveals no gallop.   No murmur heard.  Pulses:       Carotid pulses are 2+ on the right side, and 2+ on the left side.       Radial pulses are 2+ on the right side, and 2+ on the left side.        Dorsalis pedis pulses are 2+ on the right side, and 2+ on the left side.        Posterior tibial pulses are 2+ on the right side, and 2+ on the left side.   Pulmonary/Chest: Effort normal and breath sounds normal. No respiratory distress. He has no wheezes. He has no rhonchi. He has no rales. He exhibits no tenderness.   Abdominal: Soft. Bowel sounds are normal. He exhibits no distension, no abdominal bruit and no mass. There is no tenderness.   Musculoskeletal: He exhibits no edema or deformity.   Lymphadenopathy:     He has no cervical adenopathy.   Neurological: He is alert and oriented to person, place, and time. No cranial nerve deficit.   Skin: Skin is warm and dry. No rash noted. He is not diaphoretic. No cyanosis. No pallor. Nails show no clubbing.   Psychiatric: He has a normal mood and affect. Judgment normal.   Vitals reviewed.      Lab Review:       Assessment:      Diagnosis Plan   1. Essential hypertension     2. Hyperlipidemia, unspecified hyperlipidemia type     3. Type 2 diabetes mellitus without complication, without long-term current use of insulin (CMS/[facility])     4. Arteriosclerotic vascular disease        1. Normal stress perfusion study 8/2012 and 1/2015.  Abnormal stress echocardiogram done 2/2018, very mild CAD on cath 5/2/18  2. Abnormal ECG. Left anterior fascicular block which is chronic.  3. Hypertension, labile.   4. Diabetes mellitus type 2. Stable.  5. Obstructive sleep apnea on CPAP. Getting retested for this.  6. Hyperlipidemia. On atorvastatin.   7. Hepatitis C, stable.  8. History of rheumatoid fever, stable. No  valvular heart problems.   9. History of gout.  10. Plaquing of the left carotid artery.     Plan:       See back in 1 year.  No changes.  Is on steroids for left foot inflammation, to have surgery with Dr. Ruby next week.

## 2019-06-10 ENCOUNTER — ANESTHESIA EVENT (OUTPATIENT)
Dept: PERIOP | Facility: HOSPITAL | Age: 69
End: 2019-06-10

## 2019-06-10 ENCOUNTER — HOSPITAL ENCOUNTER (OUTPATIENT)
Facility: HOSPITAL | Age: 69
Setting detail: HOSPITAL OUTPATIENT SURGERY
Discharge: HOME | End: 2019-06-10
Attending: ORTHOPAEDIC SURGERY | Admitting: ORTHOPAEDIC SURGERY

## 2019-06-10 ENCOUNTER — ANESTHESIA (OUTPATIENT)
Dept: PERIOP | Facility: HOSPITAL | Age: 69
End: 2019-06-10

## 2019-06-10 VITALS
TEMPERATURE: 97.8 F | DIASTOLIC BLOOD PRESSURE: 88 MMHG | HEART RATE: 67 BPM | RESPIRATION RATE: 16 BRPM | SYSTOLIC BLOOD PRESSURE: 137 MMHG | BODY MASS INDEX: 29.99 KG/M2 | WEIGHT: 209 LBS | OXYGEN SATURATION: 98 %

## 2019-06-10 LAB — GLUCOSE BLDC GLUCOMTR-MCNC: 151 MG/DL (ref 70–130)

## 2019-06-10 PROCEDURE — 25010000002 MIDAZOLAM PER 1 MG: Performed by: ANESTHESIOLOGY

## 2019-06-10 PROCEDURE — 25010000003 CEFAZOLIN IN DEXTROSE 2-4 GM/100ML-% SOLUTION: Performed by: ORTHOPAEDIC SURGERY

## 2019-06-10 PROCEDURE — C9290 INJ, BUPIVACAINE LIPOSOME: HCPCS | Performed by: ANESTHESIOLOGY

## 2019-06-10 PROCEDURE — C1713 ANCHOR/SCREW BN/BN,TIS/BN: HCPCS | Performed by: ORTHOPAEDIC SURGERY

## 2019-06-10 PROCEDURE — 25010000002 FENTANYL CITRATE (PF) 100 MCG/2ML SOLUTION: Performed by: ANESTHESIOLOGY

## 2019-06-10 PROCEDURE — 25010000002 ONDANSETRON PER 1 MG: Performed by: NURSE ANESTHETIST, CERTIFIED REGISTERED

## 2019-06-10 PROCEDURE — 82962 GLUCOSE BLOOD TEST: CPT

## 2019-06-10 PROCEDURE — 25010000003 BUPIVACAINE LIPOSOME 1.3 % SUSPENSION: Performed by: ANESTHESIOLOGY

## 2019-06-10 PROCEDURE — 25010000002 PROPOFOL 10 MG/ML EMULSION: Performed by: NURSE ANESTHETIST, CERTIFIED REGISTERED

## 2019-06-10 DEVICE — SUT/ANCH BIOCOMP SUTTAK 1 SUT W/NDL NO1 3X14: Type: IMPLANTABLE DEVICE | Site: ANKLE | Status: FUNCTIONAL

## 2019-06-10 RX ORDER — CEFAZOLIN SODIUM 2 G/100ML
2 INJECTION, SOLUTION INTRAVENOUS ONCE
Status: COMPLETED | OUTPATIENT
Start: 2019-06-10 | End: 2019-06-10

## 2019-06-10 RX ORDER — SODIUM CHLORIDE, SODIUM LACTATE, POTASSIUM CHLORIDE, CALCIUM CHLORIDE 600; 310; 30; 20 MG/100ML; MG/100ML; MG/100ML; MG/100ML
9 INJECTION, SOLUTION INTRAVENOUS CONTINUOUS
Status: DISCONTINUED | OUTPATIENT
Start: 2019-06-10 | End: 2019-06-10 | Stop reason: HOSPADM

## 2019-06-10 RX ORDER — PROPOFOL 10 MG/ML
VIAL (ML) INTRAVENOUS AS NEEDED
Status: DISCONTINUED | OUTPATIENT
Start: 2019-06-10 | End: 2019-06-10 | Stop reason: SURG

## 2019-06-10 RX ORDER — PROMETHAZINE HYDROCHLORIDE 25 MG/ML
6.25 INJECTION, SOLUTION INTRAMUSCULAR; INTRAVENOUS ONCE AS NEEDED
Status: DISCONTINUED | OUTPATIENT
Start: 2019-06-10 | End: 2019-06-10 | Stop reason: HOSPADM

## 2019-06-10 RX ORDER — NALBUPHINE HCL 10 MG/ML
2 AMPUL (ML) INJECTION EVERY 4 HOURS PRN
Status: DISCONTINUED | OUTPATIENT
Start: 2019-06-10 | End: 2019-06-10 | Stop reason: HOSPADM

## 2019-06-10 RX ORDER — NALOXONE HCL 0.4 MG/ML
0.4 VIAL (ML) INJECTION AS NEEDED
Status: DISCONTINUED | OUTPATIENT
Start: 2019-06-10 | End: 2019-06-10 | Stop reason: HOSPADM

## 2019-06-10 RX ORDER — PROMETHAZINE HYDROCHLORIDE 25 MG/1
25 TABLET ORAL ONCE AS NEEDED
Status: DISCONTINUED | OUTPATIENT
Start: 2019-06-10 | End: 2019-06-10 | Stop reason: HOSPADM

## 2019-06-10 RX ORDER — ACETAMINOPHEN 325 MG/1
650 TABLET ORAL ONCE AS NEEDED
Status: DISCONTINUED | OUTPATIENT
Start: 2019-06-10 | End: 2019-06-10 | Stop reason: HOSPADM

## 2019-06-10 RX ORDER — BUPIVACAINE HYDROCHLORIDE 2.5 MG/ML
INJECTION, SOLUTION EPIDURAL; INFILTRATION; INTRACAUDAL
Status: COMPLETED | OUTPATIENT
Start: 2019-06-10 | End: 2019-06-10

## 2019-06-10 RX ORDER — ONDANSETRON 2 MG/ML
INJECTION INTRAMUSCULAR; INTRAVENOUS AS NEEDED
Status: DISCONTINUED | OUTPATIENT
Start: 2019-06-10 | End: 2019-06-10 | Stop reason: SURG

## 2019-06-10 RX ORDER — HYDROCODONE BITARTRATE AND ACETAMINOPHEN 5; 325 MG/1; MG/1
1 TABLET ORAL ONCE AS NEEDED
Status: DISCONTINUED | OUTPATIENT
Start: 2019-06-10 | End: 2019-06-10 | Stop reason: HOSPADM

## 2019-06-10 RX ORDER — MIDAZOLAM HYDROCHLORIDE 1 MG/ML
1 INJECTION INTRAMUSCULAR; INTRAVENOUS
Status: DISCONTINUED | OUTPATIENT
Start: 2019-06-10 | End: 2019-06-10 | Stop reason: HOSPADM

## 2019-06-10 RX ORDER — SODIUM CHLORIDE 0.9 % (FLUSH) 0.9 %
1-10 SYRINGE (ML) INJECTION AS NEEDED
Status: DISCONTINUED | OUTPATIENT
Start: 2019-06-10 | End: 2019-06-10 | Stop reason: HOSPADM

## 2019-06-10 RX ORDER — LIDOCAINE HYDROCHLORIDE 20 MG/ML
INJECTION, SOLUTION INFILTRATION; PERINEURAL AS NEEDED
Status: DISCONTINUED | OUTPATIENT
Start: 2019-06-10 | End: 2019-06-10 | Stop reason: SURG

## 2019-06-10 RX ORDER — FENTANYL CITRATE 50 UG/ML
50 INJECTION, SOLUTION INTRAMUSCULAR; INTRAVENOUS
Status: DISCONTINUED | OUTPATIENT
Start: 2019-06-10 | End: 2019-06-10 | Stop reason: HOSPADM

## 2019-06-10 RX ORDER — MIDAZOLAM HYDROCHLORIDE 1 MG/ML
2 INJECTION INTRAMUSCULAR; INTRAVENOUS
Status: DISCONTINUED | OUTPATIENT
Start: 2019-06-10 | End: 2019-06-10 | Stop reason: HOSPADM

## 2019-06-10 RX ORDER — LIDOCAINE HYDROCHLORIDE 10 MG/ML
0.5 INJECTION, SOLUTION EPIDURAL; INFILTRATION; INTRACAUDAL; PERINEURAL ONCE AS NEEDED
Status: DISCONTINUED | OUTPATIENT
Start: 2019-06-10 | End: 2019-06-10 | Stop reason: HOSPADM

## 2019-06-10 RX ORDER — DIPHENHYDRAMINE HYDROCHLORIDE 50 MG/ML
12.5 INJECTION INTRAMUSCULAR; INTRAVENOUS
Status: DISCONTINUED | OUTPATIENT
Start: 2019-06-10 | End: 2019-06-10 | Stop reason: HOSPADM

## 2019-06-10 RX ORDER — HYDRALAZINE HYDROCHLORIDE 20 MG/ML
5 INJECTION INTRAMUSCULAR; INTRAVENOUS
Status: DISCONTINUED | OUTPATIENT
Start: 2019-06-10 | End: 2019-06-10 | Stop reason: HOSPADM

## 2019-06-10 RX ORDER — HYDROMORPHONE HYDROCHLORIDE 1 MG/ML
0.5 INJECTION, SOLUTION INTRAMUSCULAR; INTRAVENOUS; SUBCUTANEOUS
Status: DISCONTINUED | OUTPATIENT
Start: 2019-06-10 | End: 2019-06-10 | Stop reason: HOSPADM

## 2019-06-10 RX ORDER — ACETAMINOPHEN 650 MG/1
650 SUPPOSITORY RECTAL ONCE AS NEEDED
Status: DISCONTINUED | OUTPATIENT
Start: 2019-06-10 | End: 2019-06-10 | Stop reason: HOSPADM

## 2019-06-10 RX ORDER — ACETAMINOPHEN 500 MG
500 TABLET ORAL ONCE
Status: COMPLETED | OUTPATIENT
Start: 2019-06-10 | End: 2019-06-10

## 2019-06-10 RX ORDER — SODIUM CHLORIDE 0.9 % (FLUSH) 0.9 %
3 SYRINGE (ML) INJECTION EVERY 12 HOURS SCHEDULED
Status: DISCONTINUED | OUTPATIENT
Start: 2019-06-10 | End: 2019-06-10 | Stop reason: HOSPADM

## 2019-06-10 RX ORDER — NALBUPHINE HCL 10 MG/ML
10 AMPUL (ML) INJECTION EVERY 4 HOURS PRN
Status: DISCONTINUED | OUTPATIENT
Start: 2019-06-10 | End: 2019-06-10 | Stop reason: HOSPADM

## 2019-06-10 RX ORDER — PROMETHAZINE HYDROCHLORIDE 25 MG/1
25 SUPPOSITORY RECTAL ONCE AS NEEDED
Status: DISCONTINUED | OUTPATIENT
Start: 2019-06-10 | End: 2019-06-10 | Stop reason: HOSPADM

## 2019-06-10 RX ORDER — BACITRACIN 50000 [IU]/1
INJECTION, POWDER, FOR SOLUTION INTRAMUSCULAR
Status: DISCONTINUED
Start: 2019-06-10 | End: 2019-06-10 | Stop reason: HOSPADM

## 2019-06-10 RX ADMIN — CEFAZOLIN SODIUM 2 G: 2 INJECTION, SOLUTION INTRAVENOUS at 07:38

## 2019-06-10 RX ADMIN — MIDAZOLAM 1 MG: 1 INJECTION INTRAMUSCULAR; INTRAVENOUS at 06:58

## 2019-06-10 RX ADMIN — BUPIVACAINE HYDROCHLORIDE 10 ML: 2.5 INJECTION, SOLUTION EPIDURAL; INFILTRATION; INTRACAUDAL; PERINEURAL at 06:54

## 2019-06-10 RX ADMIN — MIDAZOLAM 2 MG: 1 INJECTION INTRAMUSCULAR; INTRAVENOUS at 06:43

## 2019-06-10 RX ADMIN — LIDOCAINE HYDROCHLORIDE 100 MG: 20 INJECTION, SOLUTION INFILTRATION; PERINEURAL at 07:34

## 2019-06-10 RX ADMIN — BUPIVACAINE 10 ML: 13.3 INJECTION, SUSPENSION, LIPOSOMAL INFILTRATION at 06:54

## 2019-06-10 RX ADMIN — ONDANSETRON 4 MG: 2 INJECTION INTRAMUSCULAR; INTRAVENOUS at 08:10

## 2019-06-10 RX ADMIN — PROPOFOL 200 MG: 10 INJECTION, EMULSION INTRAVENOUS at 07:34

## 2019-06-10 RX ADMIN — FENTANYL CITRATE 50 MCG: 50 INJECTION INTRAMUSCULAR; INTRAVENOUS at 06:43

## 2019-06-10 RX ADMIN — ACETAMINOPHEN 500 MG: 500 TABLET, FILM COATED ORAL at 06:34

## 2019-06-10 RX ADMIN — SODIUM CHLORIDE, POTASSIUM CHLORIDE, SODIUM LACTATE AND CALCIUM CHLORIDE 9 ML/HR: 600; 310; 30; 20 INJECTION, SOLUTION INTRAVENOUS at 06:34

## 2019-06-10 NOTE — ANESTHESIA PREPROCEDURE EVALUATION
Anesthesia Evaluation     NPO Solid Status: > 8 hours             Airway   Mallampati: II  TM distance: >3 FB  Neck ROM: full  Possible difficult intubation  Dental - normal exam     Pulmonary - normal exam   (+) COPD, shortness of breath, sleep apnea,   Cardiovascular   Exercise tolerance: good (4-7 METS)    ECG reviewed  Rhythm: regular    (+) hypertension, valvular problems/murmurs murmur, hyperlipidemia,  carotid artery disease  (-) murmur, carotid bruits    ROS comment: Normal cath & echo 1 year ago    Neuro/Psych  (+) psychiatric history,     GI/Hepatic/Renal/Endo    (+)   diabetes mellitus type 2 well controlled,     Musculoskeletal     Abdominal    Substance History      OB/GYN          Other                      Anesthesia Plan    ASA 3     general with block   (  D/W R&B of GA including but not limited to: heart, lung, liver, kidney, neurologic problems, positioning injuries, dental damage, corneal abrasion and TMJ.  .Risks of peripheral nerve block were discussed with patient including but not limited to: inadequate block, bleeding, infection, persistent numbness or weakness, nerve damage, painful dysasthesia and need to protect limb while numb.)  intravenous induction

## 2019-06-10 NOTE — ANESTHESIA PROCEDURE NOTES
Peripheral Block    Pre-sedation assessment completed: 6/10/2019 6:46 AM    Patient reassessed immediately prior to procedure    Patient location during procedure: pre-op  Start time: 6/10/2019 6:47 AM  Stop time: 6/10/2019 6:53 AM  Reason for block: at surgeon's request and post-op pain management  Performed by  Anesthesiologist: Kevan Ratliff MD  Preanesthetic Checklist  Completed: patient identified, site marked, surgical consent, pre-op evaluation, timeout performed, IV checked, risks and benefits discussed and monitors and equipment checked  Prep:  Pt Position: supine  Sterile barriers:gloves  Prep: ChloraPrep  Patient monitoring: blood pressure monitoring, continuous pulse oximetry and EKG  Procedure  Sedation:yes    Guidance:ultrasound guided  ULTRASOUND INTERPRETATION.  Using ultrasound guidance a 22 G gauge needle was placed in close proximity to the sciatic nerve, at which point, under ultrasound guidance anesthetic was injected in the area of the nerve and spread of the anesthesia was seen on ultrasound in close proximity thereto.  There were no abnormalities seen on ultrasound; a digital image was taken; and the patient tolerated the procedure with no complications. Images:still images obtained    Laterality:left  Block Type:sciatic and popliteal  Injection Technique:single-shot  Needle Type:short-bevel  Needle Gauge:22 G      Medications Used: bupivacaine liposome (EXPAREL) 1.3 % injection, 10 mL  bupivacaine PF (MARCAINE) 0.25 % injection, 10 mL      Post Assessment  Injection Assessment: negative aspiration for heme, no paresthesia on injection and incremental injection  Patient Tolerance:comfortable throughout block  Complications:no

## 2019-06-10 NOTE — H&P
History & Physical       Patient: Mike Cox    Date of Admission: 6/10/2019  5:40 AM    YOB: 1950    Medical Record Number: [id number]    Attending Physician: Ravi Ruby MD        Chief Complaints: Left foot pain      History of Present Illness: 68 y.o. male presents with gout, left fifth metatarsal exostosis and peroneus brevis insufficiency. Onset of symptoms was gradual starting unknown years ago.  Symptoms are associated with a limp.  Symptoms are aggravated by shoes.   Symptoms improve with nothing. Patient is now being admitted to the services of Ravi Ruby MD for further evaluation and treatment.     No Known Allergies    Medications:     Home Medications:  No current facility-administered medications on file prior to encounter.      Current Outpatient Medications on File Prior to Encounter   Medication Sig   • ACCU-CHEK FASTCLIX LANCETS misc TEST TID   • ACCU-CHEK SMARTVIEW test strip USE TO TEST BLOOD SUGAR BID   • aspirin (ASPIRIN ADULT LOW STRENGTH) 81 MG EC tablet Take 1 tablet by mouth Daily.   • BYSTOLIC 10 MG tablet TAKE 1 TABLET BY MOUTH DAILY   • cholecalciferol (VITAMIN D3) 1000 units tablet Take 2,000 Units by mouth Daily.   • Cyanocobalamin (VITAMIN B-12 PO) Take 5,000 mg by mouth Daily.   • glimepiride (AMARYL) 4 MG tablet Take 4 mg by mouth Every Morning Before Breakfast.   • hydrocortisone (PROCTOZONE-HC) 2.5 % rectal cream Insert  into the rectum Daily. AS NEEDED FOR HEMORRHOIDS (Patient taking differently: Insert 1 application into the rectum Daily. AS NEEDED FOR HEMORRHOIDS)   • Linagliptin-Metformin HCl (JENTADUETO) 2.5-1000 MG tablet Take 1 tablet by mouth 2 (Two) Times a Day.   • lisinopril (PRINIVIL,ZESTRIL) 20 MG tablet Take 1 tablet by mouth Daily.   • Omega-3 Fatty Acids (FISH OIL) 1000 MG capsule capsule Take 1,000 mg by mouth Daily With Breakfast.   • PROCTOZONE-HC 2.5 % rectal cream INSERT RECTALLY TWICE DAILY   • tamsulosin (FLOMAX) 0.4 MG  capsule 24 hr capsule Take 0.8 mg by mouth Daily.     Current Medications:  Scheduled Meds:  bacitracin      bupivacaine liposome 10 mL Infiltration Once   ceFAZolin 2 g Intravenous Once   sodium chloride 3 mL Intravenous Q12H     Continuous Infusions:  lactated ringers 9 mL/hr Last Rate: 9 mL/hr (06/10/19 0634)     PRN Meds:.•  acetaminophen **OR** acetaminophen  •  diphenhydrAMINE  •  fentanyl  •  fentanyl  •  hydrALAZINE  •  HYDROcodone-acetaminophen  •  HYDROmorphone  •  lidocaine PF 1%  •  metoprolol tartrate  •  midazolam **OR** midazolam  •  nalbuphine **OR** nalbuphine  •  naloxone  •  promethazine **OR** promethazine **OR** promethazine **OR** promethazine  •  sodium chloride       Past Medical History:   Diagnosis Date   • Arteriosclerotic vascular disease    • BPH (benign prostatic hyperplasia)    • Carotid artery disease (CMS/HCC)     RIGHT   • Colon polyp    • Diabetes mellitus (CMS/HCC) 2002    type II, pt reports A1C of 7.1 in 3/2016   • Dyspnea on exertion     with fatigue   • Gout    • Heart murmur    • Hemorrhoids    • High cholesterol    • History of hepatitis C     In remission after Interferon and Ribavirin treatment   • HSV-2 infection    • Hyperlipidemia    • Hypertension    • PAULETTE (obstructive sleep apnea)     USES BIPAP, 2018 resolved, no longer uses cpap   • Peroneal tendonitis     LEFT   • Plantar fasciitis    • Substance abuse (CMS/[facility])     H/O DRUG ABUSE, NOW SOBER( IV DRUG USE)   • Substance abuse (CMS/HCC) 1987    H/O ALCOHOL ABUSE   • Vitamin B 12 deficiency    • Vitamin D deficiency         Past Surgical History:   Procedure Laterality Date   • CARDIAC CATHETERIZATION N/A 5/2/2018    Procedure: Coronary angiography;  Surgeon: Maik Preciado MD;  Location: [facility] CATH INVASIVE LOCATION;  Service: Cardiovascular   • CARDIAC CATHETERIZATION N/A 5/2/2018    Procedure: Left ventriculography;  Surgeon: Maik Preciado MD;  Location: [facility] CATH INVASIVE LOCATION;  Service: Cardiovascular    • COLONOSCOPY N/A 09/10/2013    Normal, repeat in 10 years-Dr. Svetlana Ornelas   • COLONOSCOPY N/A 2006    Normal, repeat in 5 years-Dr. Jackson Denise   • COLONOSCOPY N/A 2009    Normal, repeat in 5 years-Dr. Jackson Denise   • COLONOSCOPY N/A 2018    Procedure: COLONOSCOPY into cecum and ti;  Surgeon: Svetlana Ornelas MD;  Location: [facility] ENDOSCOPY;  Service: Gastroenterology   • CYST REMOVAL N/A 2015    ON FOREHEAD, EPIDERMOID/SEBACEOUS, DR. SAHIL PFEIFFER   • CYSTOSCOPY     • SHOULDER SURGERY Bilateral     BONE SPURS, DR. TYRA HESS   • TENNIS ELBOW RELEASE Right 2017    Procedure: RIGHT ELBOW OPEN FLEXOR TENDON DEBRIDEMENT AND REPAIR;  Surgeon: FABIO Hess MD;  [place] [facility] [place] [facility] [place]  Service:         Social History     Occupational History   • Occupation: Psychotherapist/     Employer: OTHER     Comment: [employer]   Tobacco Use   • Smoking status: Former Smoker     Packs/day: 1.00     Years: 20.00     Pack years: 20.00     Types: Cigarettes     Start date:      Last attempt to quit:      Years since quittin.4   • Smokeless tobacco: Never Used   • Tobacco comment: Caffeine use 3 cups daily   Substance and Sexual Activity   • Alcohol use: No     Comment: sober for 32 years   • Drug use: No     Comment: sober since 35 h/o iv drug use   • Sexual activity: Defer    Social History     Social History Narrative   • Not on file        Family History   Problem Relation Age of Onset   • Heart disease Father    • Hypertension Father    • Alcohol abuse Father    • Colon cancer Paternal Uncle         diagnosed in 60s   • Hypertension Paternal Uncle    • Heart disease Paternal Uncle    • Diabetes Paternal Uncle    • Diabetes Sister    • Hyperlipidemia Sister    • Hypertension Sister    • Kidney disease Sister    • Lung disease Mother    • COPD Mother    • Cancer Paternal Grandmother    • Malig Hyperthermia Neg Hx          Review of Systems:   HEENT: Patient  denies any headaches, vision changes, change in hearing, or tinnitus, Patient denies any rhinorrhea,epistaxis, sinus pain, mouth or dental problems, sore throat or hoarseness, or dysphagia  Pulmonary: Patient denies any cough, congestion, SOA, or wheezing  Cardiovascular: Patient denies any chest pain, dyspnea, palpitations, weakness, intolerance of exercise, varicosities, swelling of extremities, known murmur  Gastrointestinal:  Patient denies nausea, vomiting, diarrhea, constipation, loss  of appetite, change in appetite, dysphagia, gas, heartburn, melena, change in bowel habits, use of laxatives or other drugs to alter the function of the gastrointestinal tract.  Genital/Urinary: Patient denies dysuria, change in color of urine, change in frequency of urination, pain with urgency, incontinence, retention, or nocturia.  Musculoskeletal: With the exception of the involved extremity, the patient denies increased warmth; redness; or swelling of joints; limitation of function; deformity; crepitation: pain in a joint or an extremity, the neck, or the back, especially with movement.  Neurological: Patient denies dizziness, tremor, ataxia, difficulty in speaking, change in speech, paresthesia, loss of sensation, seizures, syncope, changes in memory.  Endocrine system: Patient denies tremors, palpitations, intolerance of heat or cold, polyuria, polydipsia, polyphagia, diaphoresis, exophthalmos, or goiter.  Psychological: Patient denies thoughts/plans or harming self or other; depression,  insomnia, night terrors, isaias, memory loss, disorientation.  Skin: Patient denies any bruising, rashes, discoloration, pruritus, wounds, or changes in the hair or nails.  Hematopoietic: Patient denies history of spontaneous or excessive bleeding, epistaxis, hematuria, melena, fatigue, enlarged or tender lymph nodes, pallor, history of anemia.    Physical Exam: 68 y.o. male  General Appearance:    Alert, cooperative, in no acute distress                     Vitals:    06/10/19 0600 06/10/19 0605 06/10/19 0655   BP:  (!) 150/101 157/85   BP Location:  Right arm    Patient Position:  Sitting Lying   Pulse:  70 61   Resp:  16 12   Temp:  97.8 °F (36.6 °C)    TempSrc:  Oral    SpO2:  96% 96%   Weight: 94.8 kg (208 lb 15.9 oz)          Head:    Normocephalic, without obvious abnormality, atraumatic   Eyes:            Lids and lashes normal, conjunctivae and sclerae normal, no   icterus, no pallor, corneas clear, PERRLA   Ears:    Ears appear intact with no abnormalities noted   Throat:   No oral lesions, no thrush, oral mucosa moist   Neck:   No adenopathy, supple, trachea midline, no thyromegaly, no   carotid bruit, no JVD   Back:     No kyphosis present, no scoliosis present, no skin lesions,      erythema or scars, no tenderness to percussion or                   palpation,   range of motion normal   Lungs:     Clear to auscultation,respirations regular, even and                  unlabored    Heart:    Regular rhythm and normal rate, normal S1 and S2, no            murmur, no gallop, no rub, no click   Chest Wall:    No abnormalities observed   Abdomen:     Normal bowel sounds, no masses, no organomegaly, soft        non-tender, non-distended, no guarding, no rebound                tenderness   Rectal:     Deferred   Extremities:   Tenderness over left fifth met  . Moves all extremities well, no edema,   no cyanosis, no redness   Pulses:   Pulses palpable and equal bilaterally   Skin:   No bleeding, bruising or rash   Lymph nodes:   No palpable adenopathy   Neurologic:   Cranial nerves 2 - 12 grossly intact, sensation intact, DTR       present and equal bilaterally           Diagnostic Tests:  Results from last 7 days   Lab Units 06/03/19  1432   WBC 10*3/mm3 13.29*   HEMOGLOBIN g/dL 14.2   HEMATOCRIT % 42.8   PLATELETS 10*3/mm3 221     Results from last 7 days   Lab Units 06/04/19  0917 06/03/19  1432   SODIUM mmol/L 137 133*   POTASSIUM mmol/L 4.8  4.4   CHLORIDE mmol/L 98 95*   CO2 mmol/L 27.6 24.5   BUN mg/dL 13 14   CREATININE mg/dL 0.81 0.84   GLUCOSE mg/dL 196* 156*   CALCIUM mg/dL 9.3 9.2         No results found for: CRYSTAL]  Culture   Date Value Ref Range Status   04/15/2018 Final report  Final   ]  No results found for: URICACID]    No results found.    Assessment:  Patient Active Problem List   Diagnosis   • Diastasis recti   • History of colon polyps   • Breast pain   • Acute bronchitis   • Elevated serum alkaline phosphatase level   • Arteriosclerotic vascular disease   • Cellulitis of trunk   • Chronic obstructive pulmonary disease (CMS/HCC)   • Depression   • Fatigue   • Genital herpes simplex   • Gout   • Calcaneal spur   • Hyperlipidemia   • Infected sebaceous cyst   • Medial epicondylitis of elbow   • Coccyx sprain   • Type 2 diabetes mellitus (CMS/HCC)   • Cobalamin deficiency   • Medicare annual wellness visit, subsequent   • Elevated LFTs   • Internal hemorrhoids   • Umbilical hernia without obstruction and without gangrene   • Essential hypertension   • Dyspnea on exertion   • Type 2 diabetes mellitus without complication, without long-term current use of insulin (CMS/[facility])           Plan:  The patient voiced understanding of the risks, benefits, and alternative forms of treatment that were discussed. All questions were answered and the patient consents to proceed with the procedure as planned.        Discharge Plan: today to home      Date: 6/10/2019    Ravi Ruby MD    EMR Dragon/Transcription disclaimer:   Much of this encounter note is an electronic transcription/translation of spoken language to printed text. The electronic translation of spoken language may permit erroneous, or at times, nonsensical words or phrases to be inadvertently transcribed; Although I have reviewed the note for such errors, some may still exist.

## 2019-06-10 NOTE — ANESTHESIA PROCEDURE NOTES
Airway  Urgency: elective    Airway not difficult    General Information and Staff    Patient location during procedure: OR  Anesthesiologist: Kevan Ratliff MD  CRNA: Maggi Loya CRNA    Indications and Patient Condition  Indications for airway management: airway protection    Preoxygenated: yes  MILS not maintained throughout  Mask difficulty assessment: 1 - vent by mask    Final Airway Details  Final airway type: supraglottic airway      Successful airway: classic  Size 5    Number of attempts at approach: 1    Additional Comments  Inflated to seal.

## 2019-06-10 NOTE — OP NOTE
Preoperative diagnosis: Left fifth metatarsal exostosis with peroneus brevis partial tendon tear and insufficiency    Postoperative diagnosis: Same with small amount of tophaceous material    Procedure: Left fifth metatarsal ostectomy and secondary peroneus brevis tendon reconstruction    General anesthesia with a block by the anesthesiologist for postoperative pain relief    Surgeon: Ravi Ruby MD    No assistant complication specimen or drain    The mass excised was 12 x 16 x 28 mm, examined, and discarded.    Technique: After appropriate preoperative consultation, the patient is brought to the operating room and made comfortable on the operating room table.  Parenteral antibiotics were given and a surgical pause was undertaken to identify appropriate patient,  surgical site, and surgeon.  After satisfactory general anesthesia, the patient was carefully turned to the lateral decubitus position with the left side up.  Great care was taken to pad all bony prominences and an axillary roll was used in the recumbent right axilla.  A well-padded left proximal thigh tourniquet was applied.  The patient was fastened to the table with beanbag pillows braces and tape.  Anatomic landmarks were noted.  Sterile prep of the left lower extremity distal to the knee is done in the usual free fashion.  The limb is elevated, partially exsanguinated by gravity, and the tourniquet inflated to 250 mmHg.    A longitudinal incision based over the very prominent plantar and lateral proximal fifth metatarsal was made through skin and dermis only and curved cephalad along the distal course of the involved peroneus brevis tendon.  Blunt dissection was taken deep to the dermis protecting cuticular nerves and using cautery as needed for hemostasis.  Dissection was carried down onto this huge mass and a small amount of tophaceous material encountered and debrided away with a rongeur.  A longitudinal incision was made through the fascia and  the diseased distal peroneus brevis tendon, preserving the attachment of the plantar fascia and peroneus tertius to the base of the fifth metatarsal.    I next placed retractors and used a microsagittal saw to excise the bony prominence on an extra-articular basis.  Large amount of bone and exostosis were removed from the plantar and lateral aspect of the proximal fifth metatarsal without compromising its integrity more medial and distal to this.  I made the surfaces quite smooth.  The wound was copiously irrigated.  Hemostasis is checked.    Next, an Arthrex 3 mm x 14-1/2 mm bioabsorbable anchor to which was attached FiberWire suture material was placed and countersunk appropriately at the base of the fifth metatarsal perpendicular to the line of pull of the peroneus brevis muscle and tendon.  The attached FiberWire was then passed in modified [place] and mattress fashion through the distal peroneus brevis tendon tying it at the appropriate length and tension and burying the knot within the tendon.  This was then oversewn with Vicryl completing the secondary reconstruction of the compromised peroneus brevis tendon.    All of the preoperative prominence was gone.  The wound was irrigated again in another check of hemostasis completed.  The remainder of the repair was done in layers with 2-0 Vicryl on the dermis and staples everting the skin edges without tension.  Circulation had returned promptly upon releasing the tourniquet.  The wound had  been irrigated prior to closure.  I was informed that the sponge and needle counts were correct.  A sterile dressing was applied with posterior plaster splints over padding and Gentle Ace bandage compression over the top.  Patient left the operating room comfortable with his block in effect after having tolerated the procedure well.

## 2019-06-10 NOTE — ANESTHESIA POSTPROCEDURE EVALUATION
Patient: Mike Cox    Procedure Summary     Date:  06/10/19 Room / [facility]:   [facility]    Anesthesia Start:  0728 Anesthesia Stop:  0821    Procedure:  PERONEAL TENDON RECONSTRUCTION AND LEFT 5TH METATARSAL OSTECTOMY (Left Ankle) Diagnosis:      Surgeon:  Ravi Ruby MD Provider:  Kevan Ratliff MD    Anesthesia Type:  general with block ASA Status:  3          Anesthesia Type: general with block  Last vitals  BP   142/88 (06/10/19 0915)   Temp   36.6 °C (97.8 °F) (06/10/19 0820)   Pulse   64 (06/10/19 0915)   Resp   16 (06/10/19 0915)     SpO2   96 % (06/10/19 0915)     Post Anesthesia Care and Evaluation    Patient location during evaluation: bedside  Patient participation: complete - patient participated  Level of consciousness: awake  Pain score: 1  Pain management: adequate  Airway patency: patent  Anesthetic complications: No anesthetic complications    Cardiovascular status: acceptable  Respiratory status: acceptable  Hydration status: acceptable    Comments: --------------------            06/10/19               0915     --------------------   BP:       142/88     Pulse:      64       Resp:       16       Temp:                SpO2:      96%      --------------------

## 2019-07-10 RX ORDER — NEBIVOLOL 10 MG/1
10 TABLET ORAL DAILY
Qty: 90 TABLET | Refills: 3 | Status: SHIPPED | OUTPATIENT
Start: 2019-07-10 | End: 2020-06-26

## 2019-10-30 ENCOUNTER — TRANSCRIBE ORDERS (OUTPATIENT)
Dept: PHYSICAL THERAPY | Facility: CLINIC | Age: 69
End: 2019-10-30

## 2019-10-30 DIAGNOSIS — M25.561 RIGHT KNEE PAIN, UNSPECIFIED CHRONICITY: Primary | ICD-10-CM

## 2019-10-31 RX ORDER — LISINOPRIL 20 MG/1
TABLET ORAL
Qty: 90 TABLET | Refills: 1 | Status: SHIPPED | OUTPATIENT
Start: 2019-10-31 | End: 2020-04-27

## 2019-11-04 ENCOUNTER — TELEPHONE (OUTPATIENT)
Dept: CARDIOLOGY | Facility: CLINIC | Age: 69
End: 2019-11-04

## 2019-11-04 NOTE — TELEPHONE ENCOUNTER
Patient scheduled with Susan Mireles NP tomorrow 10:45am    Thanks  Angela Mehta RN  [facility] Triage Nurse

## 2019-11-04 NOTE — TELEPHONE ENCOUNTER
"11/04/19  8:52 AM  Mike Cox  1950  Home Phone [phone]   Work Phone [phone]   Mobile [phone]       Mike Cox is a patient of Dr Flores who called office this am to inform Dr Flores that he went to Urgent Care at [facility] for LE pain. Patient stated they \"said I had a DVT and that I didn't need to be anticoagulated because he has 'had it for awhile' and they told me to follow up with my cardiologist 'because it could  be serious \"  They did not do an ultrasound at his visit so question how they knew he had one.    Would you like to schedule him any testing ?    Please advise on how to proceed    Thank you  Angela Mehta RN  [facility] Triage Nurse    "

## 2019-11-05 ENCOUNTER — OFFICE VISIT (OUTPATIENT)
Dept: CARDIOLOGY | Facility: CLINIC | Age: 69
End: 2019-11-05

## 2019-11-05 ENCOUNTER — TELEPHONE (OUTPATIENT)
Dept: CARDIOLOGY | Facility: CLINIC | Age: 69
End: 2019-11-05

## 2019-11-05 ENCOUNTER — HOSPITAL ENCOUNTER (OUTPATIENT)
Dept: CARDIOLOGY | Facility: HOSPITAL | Age: 69
Discharge: HOME | End: 2019-11-05
Admitting: NURSE PRACTITIONER

## 2019-11-05 VITALS
BODY MASS INDEX: 29.78 KG/M2 | WEIGHT: 208 LBS | SYSTOLIC BLOOD PRESSURE: 174 MMHG | OXYGEN SATURATION: 98 % | HEIGHT: 70 IN | DIASTOLIC BLOOD PRESSURE: 102 MMHG | HEART RATE: 83 BPM

## 2019-11-05 DIAGNOSIS — M79.604 RIGHT LEG PAIN: Primary | ICD-10-CM

## 2019-11-05 DIAGNOSIS — M79.604 RIGHT LEG PAIN: ICD-10-CM

## 2019-11-05 LAB
BH CV LOW VAS RIGHT POPLITEAL SPONT: 1
BH CV LOWER VASCULAR LEFT COMMON FEMORAL AUGMENT: NORMAL
BH CV LOWER VASCULAR LEFT COMMON FEMORAL COMPETENT: NORMAL
BH CV LOWER VASCULAR LEFT COMMON FEMORAL COMPRESS: NORMAL
BH CV LOWER VASCULAR LEFT COMMON FEMORAL PHASIC: NORMAL
BH CV LOWER VASCULAR LEFT COMMON FEMORAL SPONT: NORMAL
BH CV LOWER VASCULAR RIGHT COMMON FEMORAL AUGMENT: NORMAL
BH CV LOWER VASCULAR RIGHT COMMON FEMORAL COMPETENT: NORMAL
BH CV LOWER VASCULAR RIGHT COMMON FEMORAL COMPRESS: NORMAL
BH CV LOWER VASCULAR RIGHT COMMON FEMORAL PHASIC: NORMAL
BH CV LOWER VASCULAR RIGHT COMMON FEMORAL SPONT: NORMAL
BH CV LOWER VASCULAR RIGHT DISTAL FEMORAL COMPRESS: NORMAL
BH CV LOWER VASCULAR RIGHT GASTRONEMIUS COMPRESS: NORMAL
BH CV LOWER VASCULAR RIGHT GREATER SAPH AK COMPRESS: NORMAL
BH CV LOWER VASCULAR RIGHT GREATER SAPH BK COMPRESS: NORMAL
BH CV LOWER VASCULAR RIGHT MID FEMORAL AUGMENT: NORMAL
BH CV LOWER VASCULAR RIGHT MID FEMORAL COMPETENT: NORMAL
BH CV LOWER VASCULAR RIGHT MID FEMORAL COMPRESS: NORMAL
BH CV LOWER VASCULAR RIGHT MID FEMORAL PHASIC: NORMAL
BH CV LOWER VASCULAR RIGHT MID FEMORAL SPONT: NORMAL
BH CV LOWER VASCULAR RIGHT PERONEAL COMPRESS: NORMAL
BH CV LOWER VASCULAR RIGHT POPLITEAL AUGMENT: NORMAL
BH CV LOWER VASCULAR RIGHT POPLITEAL COMPETENT: NORMAL
BH CV LOWER VASCULAR RIGHT POPLITEAL COMPRESS: NORMAL
BH CV LOWER VASCULAR RIGHT POPLITEAL PHASIC: NORMAL
BH CV LOWER VASCULAR RIGHT POPLITEAL SPONT: NORMAL
BH CV LOWER VASCULAR RIGHT POSTERIOR TIBIAL COMPRESS: NORMAL
BH CV LOWER VASCULAR RIGHT PROXIMAL FEMORAL COMPRESS: NORMAL
BH CV LOWER VASCULAR RIGHT SAPHENOFEMORAL JUNCTION COMPRESS: NORMAL
BH CV VAS POP FLUID COLLECTED: 1

## 2019-11-05 PROCEDURE — 93971 EXTREMITY STUDY: CPT

## 2019-11-05 PROCEDURE — 99213 OFFICE O/P EST LOW 20 MIN: CPT | Performed by: NURSE PRACTITIONER

## 2019-11-05 NOTE — TELEPHONE ENCOUNTER
11/05/19  1:53 PM  Mike Cox  1950  Home Phone [phone]   Work Phone [phone]   Mobile [phone]       Mike Cox is a patient who was seen by Ssuan today.  Luciano with vascular lab called to let us know dopler was negative for DVT, but there was a fluid collection behind his right knee.    Results given to Susan.  Susan asked me to call the patient and let him know he can see his orthopedist for the fluid collection behind his knee, and that it is ok for him to travel and he can take ASA 325mg daily during his travels.    Called and passed this on to the patient and he verbalized understanding  Makeda Barajas RN  Triage nurse

## 2019-11-05 NOTE — PROGRESS NOTES
[facility] CARDIOLOGY  [address]  [facility] [address]  Phone: [phone]      Patient Name: Mike Cox  :1950  Age: 69 y.o.  Primary Cardiologist: Isabelle Flores MD  Encounter Provider:  TOOTIE Saavedra      Chief Complaint:   Chief Complaint   Patient presents with   • Follow-up     possible DVT         HPI  Mike Cox is a 69 y.o. male who presents today for follow-up for possible DVT.     Pt has a  history significant for hyperlipidemia, hypertension, COPD, type 2 diabetes.    Patient reports that approximately 1 month ago he began having right knee pain.  At that time he was seen by an orthopedic physician who determined that he had arthritis.  Patient states that since that time he is continued to have right lower extremity pain and swelling.  He does notes that approximately 2 to 3 weeks ago he had air travel and was on a cruise for approximately 1 week.  He states that 3 days ago he began having increased leg pain and swelling.  At that time he was seen at a [facility] urgent care center who thought that he needed to be evaluated for a DVT but also felt that since symptoms have been ongoing for 1 month that he could follow-up with his physician.  Patient has not had any evaluation for DVT but is concerned that he may have developed a DVT.  Patient also notes that his blood pressure has been slightly elevated over the past 3 days and he is unsure if it is from pain, the worry about his leg or something else.  He denies any chest pain, shortness of breath, palpitations, lightheadedness, swelling, fatigue.  Denies known history for DVT.      The following portions of the patient's history were reviewed and updated as appropriate: allergies, current medications, past family history, past medical history, past social history, past surgical history and problem list.      Review of Systems   Constitution: Negative for  "malaise/fatigue.   Cardiovascular: Positive for leg swelling. Negative for chest pain.   Respiratory: Negative for shortness of breath.    Musculoskeletal: Positive for myalgias.   Neurological: Negative for light-headedness.   All other systems reviewed and are negative.      OBJECTIVE:     Vitals:    11/05/19 1050   BP: (!) 174/102   BP Location: Right arm   Patient Position: Sitting   Pulse: 83   SpO2: 98%   Weight: 94.3 kg (208 lb)   Height: 177.8 cm (70\")     Body mass index is 29.84 kg/m².    Physical Exam   Constitutional: He is oriented to person, place, and time. Vital signs are normal. He appears well-developed and well-nourished.   Eyes: Conjunctivae are normal.   Neck: Carotid bruit is not present.   Cardiovascular: Normal rate, regular rhythm and normal heart sounds.   No murmur heard.  Pulmonary/Chest: Effort normal and breath sounds normal.   Abdominal: Normal appearance.   Musculoskeletal: Normal range of motion.   Swelling noted to right lower extremity.   Neurological: He is alert and oriented to person, place, and time. GCS eye subscore is 4. GCS verbal subscore is 5. GCS motor subscore is 6.   Skin: Skin is warm and dry.   Psychiatric: He has a normal mood and affect. His speech is normal and behavior is normal. Judgment and thought content normal. Cognition and memory are normal.       Procedures            ASSESSMENT:      Diagnosis Plan   1. Right leg pain  Duplex Venous Lower Extremity - Right CAR         PLAN OF CARE:     Patient has now had right lower extremity pain and swelling for approximately 1 month.  He has been evaluated by orthopedics.  He has had recent air travel and has concern for DVT.  Will send patient for right lower extremity venous Doppler to rule out DVT.  Patient states that he has travel scheduled to [place] and is concerned.  I informed him that if he does not have DVT that he can take aspirin during travel days.  He does not have any contraindications to aspirin " therapy.         Discharge Medications           Accurate as of 11/5/19 11:05 AM. If you have any questions, ask your nurse or doctor.               Changes to Medications      Instructions Start Date   hydrocortisone 2.5 % rectal cream  Commonly known as:  PROCTOZONE-HC  What changed:    · how much to take  · additional instructions   Rectal, Daily, AS NEEDED FOR HEMORRHOIDS      PROCTOZONE-HC 2.5 % rectal cream  Generic drug:  hydrocortisone  What changed:  Another medication with the same name was changed. Make sure you understand how and when to take each.   INSERT RECTALLY TWICE DAILY         Continue These Medications      Instructions Start Date   ACCU-CHEK FASTCLIX LANCETS misc   TEST TID      ACCU-CHEK SMARTVIEW test strip  Generic drug:  glucose blood   USE TO TEST BLOOD SUGAR BID      ASPIRIN ADULT LOW STRENGTH 81 MG EC tablet  Generic drug:  aspirin   1 tablet, Oral, Daily      atorvastatin 20 MG tablet  Commonly known as:  LIPITOR   20 mg, Oral, Daily      cholecalciferol 25 MCG (1000 UT) tablet  Commonly known as:  VITAMIN D3   2,000 Units, Oral, Daily      fish oil 1000 MG capsule capsule   1,000 mg, Oral, Daily With Breakfast      glimepiride 4 MG tablet  Commonly known as:  AMARYL   4 mg, Oral, Every Morning Before Breakfast      linaGLIPtin-metFORMIN HCl 2.5-1000 MG tablet  Commonly known as:  JENTADUETO   1 tablet, Oral, 2 Times Daily      lisinopril 20 MG tablet  Commonly known as:  PRINIVIL,ZESTRIL   TAKE 1 TABLET BY MOUTH EVERY DAY      nebivolol 10 MG tablet  Commonly known as:  BYSTOLIC   10 mg, Oral, Daily      oxyCODONE-acetaminophen 5-325 MG per tablet  Commonly known as:  PERCOCET   1-2 tablets, Oral, Every 4 Hours PRN      promethazine 25 MG tablet  Commonly known as:  PHENERGAN   25-50 mg, Oral, Every 4 Hours PRN      tamsulosin 0.4 MG capsule 24 hr capsule  Commonly known as:  FLOMAX   0.8 mg, Oral, Daily      triamcinolone 0.025 % cream  Commonly known as:  KENALOG   1 application,  Topical, As Needed             Thank you for allowing me to participate in the care of your patient,         TOOTIE Saavedra  [facility]  11/05/19  11:05 AM    **Allyson Disclaimer:**  Much of this encounter note is an electronic transcription/translation of spoken language to printed text. The electronic translation of spoken language may permit erroneous, or at times, nonsensical words or phrases to be inadvertently transcribed. Although I have reviewed the note for such errors, some may still exist.

## 2019-11-05 NOTE — PROGRESS NOTES
11/5/19 Rt. LEV duplex preliminary findings are negative for DVT. Preliminary report given to Makeda Barajas RN and pt released per her follow up instructions.

## 2019-11-12 ENCOUNTER — TREATMENT (OUTPATIENT)
Dept: PHYSICAL THERAPY | Facility: CLINIC | Age: 69
End: 2019-11-12

## 2019-11-12 DIAGNOSIS — M25.561 RIGHT KNEE PAIN, UNSPECIFIED CHRONICITY: Primary | ICD-10-CM

## 2019-11-12 PROCEDURE — 97161 PT EVAL LOW COMPLEX 20 MIN: CPT | Performed by: PHYSICAL THERAPIST

## 2019-11-12 PROCEDURE — 97110 THERAPEUTIC EXERCISES: CPT | Performed by: PHYSICAL THERAPIST

## 2019-11-12 NOTE — PROGRESS NOTES
Physical Therapy Initial Evaluation and Plan of Care      Patient: Mike Cox   : 1950  Diagnosis/ICD-10 Code:  Right knee pain, unspecified chronicity [M25.561]  Referring practitioner: Lm Steinberg MD  Date of Initial Visit: 2019  Today's Date: 2019  Patient seen for 1 sessions           Subjective Evaluation    History of Present Illness  Date of onset: 2019  Mechanism of injury: Pt had a recent L peroneal tendon reconstruction in 2019 by Dr. Ruby. He just finished PT for this. He was on a scooter and crutches for a long time. He had a bout of R knee pain starting 2 month ago. The pain is getting progressively worse. He went to Urgent care about the pain. He had a Doppler and it was neg, but does have a Baker's cyst in the knee. He is a member here and does like being in the water, has done classes in the past. He sees Lanette Martinez PT at Dr. Steinberg's office for land based therapy      Patient Occupation: teacher at [employer], photography Quality of life: good    Pain  Current pain ratin  At worst pain rating: 3  Location: R knee pain  Quality: tight and dull ache (stiff)  Relieving factors: medications and ice (ibuprofen, elevation)  Aggravating factors: ambulation, prolonged positioning, stairs and standing  Progression: worsening    Social Support  Lives in: multiple-level home  Lives with: spouse    Treatments  Current treatment: medication  Patient Goals  Patient goals for therapy: decreased pain and increased strength             Objective       Static Posture     Comments  Mild varus deformity of the R tibia    Observations     Right Knee   Positive for edema.     Additional Observation Details  mild    Tenderness     Additional Tenderness Details  Mild edema of medial and lateral joint line. Small Baker's cyst palpable but not tender    Active Range of Motion   Left Knee   Flexion: 130 degrees   Extension: 4 (of HE) degrees     Right Knee   Flexion: 120 degrees    Extension: 3 degrees     Strength/Myotome Testing     Left Hip   Planes of Motion   Flexion: 4+  Abduction: 4+  External rotation: 4+    Right Hip   Planes of Motion   Flexion: 4+  Abduction: 4+  External rotation: 4+    Left Knee   Flexion: 5  Extension: 5    Right Knee   Flexion: 4+  Extension: 4+    Tests     Right Knee   Negative bounce home, lateral Matthew, medial Matthew, valgus stress test at 0 degrees, valgus stress test at 30 degrees, varus stress test at 0 degrees and varus stress test at 30 degrees.        See Exercise, Manual, and Modality Logs for complete treatment.           Assessment & Plan     Assessment  Impairments: abnormal or restricted ROM, activity intolerance, impaired physical strength, lacks appropriate home exercise program and pain with function  Assessment details: Mike Cox is a 69 y.o. year-old male referred to physical therapy for R knee pain that started while he was recovering from a foot surgery about 2 months ago. He was using a scooter and crutches and feels that this flared up his knee. He reports a Baker's cyst. He presents with a stable clinical presentation.  He has no comorbidities or personal factors that may affect his progress in the plan of care.  He has mild ROM deficits, 3-120 deg, decreased hip and knee strength, and decreased flexibility of the HS and calf muscles. Pt would benefit from therapy to help improve his ability to walk, perform stairs, and sit with greater ease.    Prognosis: good  Functional Limitations: sleeping, walking and sitting  Goals  Plan Goals: ST weeks  1. Patient will be independent with education for symptom management, joint protection and strategies to minimize stress on affected tissues  2. Pt to tolerate 30 min of aquatic therapy session with reduction of pain after treatment for about 12 hours    LT wks  1. Pt to improve R knee flexion to 125 deg for greater ease with sitting and on the stairs  2. Pt to be  independent with progressive aquatic therapy program to continue on his own at [facility]    Plan  Therapy options: will be seen for skilled physical therapy services  Planned modality interventions: cryotherapy and TENS  Other planned modality interventions: aquatic therapy  Planned therapy interventions: ADL retraining, balance/weight-bearing training, flexibility, functional ROM exercises, gait training, home exercise program, transfer training, stretching, strengthening, spinal/joint mobilization, soft tissue mobilization and neuromuscular re-education  Frequency: 2x week  Duration in visits: 20  Duration in weeks: 20  Treatment plan discussed with: patient  Plan details: Plan to see patient for 4-6 visits to get him I with a HEP        Timed:  Manual Therapy:         mins  70202;  Therapeutic Exercise:    8     mins  85537;     Neuromuscular Sathya:        mins  02432;    Therapeutic Activity:          mins  94338;     Gait Training:           mins  82662;     Ultrasound:          mins  56367;    Electrical Stimulation:         mins  49801 ( );  Iontophoresis         mins 09489      Untimed:  Electrical Stimulation:         mins  59019 ( );  Mechanical Traction:         mins  02172;     Timed Treatment:   40   mins   Total Treatment:     40   mins    PT SIGNATURE: Makeda Steele, PT   DATE TREATMENT INITIATED: 11/12/2019    Initial Certification  Certification Period: 2/10/2020  I certify that the therapy services are furnished while this patient is under my care.  The services outlined above are required by this patient, and will be reviewed every 90 days.     PHYSICIAN: Lm Steinberg MD      DATE:     Please sign and return via fax to  .. Thank you, [facility].

## 2019-11-14 ENCOUNTER — TREATMENT (OUTPATIENT)
Dept: PHYSICAL THERAPY | Facility: CLINIC | Age: 69
End: 2019-11-14

## 2019-11-14 DIAGNOSIS — M25.561 RIGHT KNEE PAIN, UNSPECIFIED CHRONICITY: Primary | ICD-10-CM

## 2019-11-14 PROCEDURE — 97113 AQUATIC THERAPY/EXERCISES: CPT | Performed by: PHYSICAL THERAPIST

## 2019-11-14 NOTE — PROGRESS NOTES
"Physical Therapy Daily Progress Note    Patient: Mike Cox   : 1950  Diagnosis/ICD-10 Code:  Right knee pain, unspecified chronicity [M25.561]  Referring practitioner: Lm Steinberg MD  Date of Initial Visit: Type: THERAPY  Noted: 2019  Today's Date: 2019  Patient seen for 2 sessions             Subjective   R knee pain is a 3/10 today.    Objective     AQUATICS LE    Water Walk  Fwd/Side/Backwards  Stretch  1  HS w/ LN 20\" X 2 ea  Stretch 2  Calf 20\" X 2 ea  Stretch 3  Quads 20\" X 2 ea  Abdominals               --  Clams     --  Hip Abd/Add  15X ea  Hip Flex/Ext  15/15 ea  March in Place Low march walk 50 ft  Mini Squat  15X shallow  Toe/Heel Raises -/15 B shallow  Leg Press w/ lg foam ring resistance X 15 ea  Uni-Clock  10X ea  Step Ups  4 inch X 15 R  Bicycle   2 min suspended and seated  Flutter/Scissor  --  Exercise 1  Hip Circles 10/10  Exercise 2  -StepBacks Next*      Assessment/Plan  Instructed Sid in basic aquatic exercises for knee ROM, strength and also balance.  Verbal cues for proper technique and how to avoid increased knee pain.  Session completed without increase in pain. Next visit add ankle weights and teach step backs.    Progress per Plan of Care and Progress strengthening /stabilization /functional activity           Timed:  Aquatic Therapy    40     mins 36389;    Maximiliano Berrios, PT  Physical Therapist      "

## 2019-12-17 RX ORDER — ATORVASTATIN CALCIUM 20 MG/1
20 TABLET, FILM COATED ORAL DAILY
Qty: 90 TABLET | Refills: 0 | Status: SHIPPED | OUTPATIENT
Start: 2019-12-17 | End: 2020-03-11

## 2019-12-18 ENCOUNTER — TELEPHONE (OUTPATIENT)
Dept: CARDIOLOGY | Facility: CLINIC | Age: 69
End: 2019-12-18

## 2019-12-18 NOTE — TELEPHONE ENCOUNTER
12/18/19  5:08 PM  Mike Cox  1950  Home Phone [phone]   Work Phone [phone]   Mobile [phone]       Mike Cox will be having knee surgery with Dr Steinberg on 01-. They are calling for cardiac clearance.    Thanks   Donna SOLIS

## 2019-12-26 ENCOUNTER — TRANSCRIBE ORDERS (OUTPATIENT)
Dept: CARDIOLOGY | Facility: HOSPITAL | Age: 69
End: 2019-12-26

## 2019-12-26 ENCOUNTER — LAB (OUTPATIENT)
Dept: LAB | Facility: HOSPITAL | Age: 69
End: 2019-12-26

## 2019-12-26 ENCOUNTER — HOSPITAL ENCOUNTER (OUTPATIENT)
Dept: GENERAL RADIOLOGY | Facility: HOSPITAL | Age: 69
Discharge: HOME | End: 2019-12-26

## 2019-12-26 ENCOUNTER — TRANSCRIBE ORDERS (OUTPATIENT)
Dept: ADMINISTRATIVE | Facility: HOSPITAL | Age: 69
End: 2019-12-26

## 2019-12-26 ENCOUNTER — HOSPITAL ENCOUNTER (OUTPATIENT)
Dept: CARDIOLOGY | Facility: HOSPITAL | Age: 69
Discharge: HOME | End: 2019-12-26
Admitting: ORTHOPAEDIC SURGERY

## 2019-12-26 DIAGNOSIS — Z01.811 PRE-OP CHEST EXAM: ICD-10-CM

## 2019-12-26 DIAGNOSIS — M25.519 ARTHRALGIA OF SHOULDER, UNSPECIFIED LATERALITY: Primary | ICD-10-CM

## 2019-12-26 DIAGNOSIS — Z01.811 PRE-OP CHEST EXAM: Primary | ICD-10-CM

## 2019-12-26 DIAGNOSIS — M25.519 ARTHRALGIA OF SHOULDER, UNSPECIFIED LATERALITY: ICD-10-CM

## 2019-12-26 LAB
ALBUMIN SERPL-MCNC: 4.8 G/DL (ref 3.5–5.2)
ALBUMIN/GLOB SERPL: 1.5 G/DL
ALP SERPL-CCNC: 97 U/L (ref 39–117)
ALT SERPL W P-5'-P-CCNC: 33 U/L (ref 1–41)
ANION GAP SERPL CALCULATED.3IONS-SCNC: 16.8 MMOL/L (ref 5–15)
AST SERPL-CCNC: 28 U/L (ref 1–40)
BASOPHILS # BLD AUTO: 0.08 10*3/MM3 (ref 0–0.2)
BASOPHILS NFR BLD AUTO: 0.7 % (ref 0–1.5)
BILIRUB SERPL-MCNC: 0.6 MG/DL (ref 0.2–1.2)
BILIRUB UR QL STRIP: NEGATIVE
BUN BLD-MCNC: 13 MG/DL (ref 8–23)
BUN/CREAT SERPL: 13.5 (ref 7–25)
CALCIUM SPEC-SCNC: 9.6 MG/DL (ref 8.6–10.5)
CHLORIDE SERPL-SCNC: 98 MMOL/L (ref 98–107)
CLARITY UR: CLEAR
CO2 SERPL-SCNC: 23.2 MMOL/L (ref 22–29)
COLOR UR: YELLOW
CREAT BLD-MCNC: 0.96 MG/DL (ref 0.76–1.27)
DEPRECATED RDW RBC AUTO: 43.1 FL (ref 37–54)
EOSINOPHIL # BLD AUTO: 0.29 10*3/MM3 (ref 0–0.4)
EOSINOPHIL NFR BLD AUTO: 2.5 % (ref 0.3–6.2)
ERYTHROCYTE [DISTWIDTH] IN BLOOD BY AUTOMATED COUNT: 13.5 % (ref 12.3–15.4)
GFR SERPL CREATININE-BSD FRML MDRD: 77 ML/MIN/1.73
GLOBULIN UR ELPH-MCNC: 3.1 GM/DL
GLUCOSE BLD-MCNC: 152 MG/DL (ref 65–99)
GLUCOSE UR STRIP-MCNC: ABNORMAL MG/DL
HCT VFR BLD AUTO: 45.1 % (ref 37.5–51)
HGB BLD-MCNC: 15.9 G/DL (ref 13–17.7)
HGB UR QL STRIP.AUTO: NEGATIVE
IMM GRANULOCYTES # BLD AUTO: 0.07 10*3/MM3 (ref 0–0.05)
IMM GRANULOCYTES NFR BLD AUTO: 0.6 % (ref 0–0.5)
KETONES UR QL STRIP: NEGATIVE
LEUKOCYTE ESTERASE UR QL STRIP.AUTO: NEGATIVE
LYMPHOCYTES # BLD AUTO: 2.57 10*3/MM3 (ref 0.7–3.1)
LYMPHOCYTES NFR BLD AUTO: 22.4 % (ref 19.6–45.3)
MCH RBC QN AUTO: 31.2 PG (ref 26.6–33)
MCHC RBC AUTO-ENTMCNC: 35.3 G/DL (ref 31.5–35.7)
MCV RBC AUTO: 88.4 FL (ref 79–97)
MONOCYTES # BLD AUTO: 0.82 10*3/MM3 (ref 0.1–0.9)
MONOCYTES NFR BLD AUTO: 7.2 % (ref 5–12)
NEUTROPHILS # BLD AUTO: 7.62 10*3/MM3 (ref 1.7–7)
NEUTROPHILS NFR BLD AUTO: 66.6 % (ref 42.7–76)
NITRITE UR QL STRIP: NEGATIVE
NRBC BLD AUTO-RTO: 0 /100 WBC (ref 0–0.2)
PH UR STRIP.AUTO: <=5 [PH] (ref 5–8)
PLATELET # BLD AUTO: 241 10*3/MM3 (ref 140–450)
PMV BLD AUTO: 10 FL (ref 6–12)
POTASSIUM BLD-SCNC: 4.9 MMOL/L (ref 3.5–5.2)
PROT SERPL-MCNC: 7.9 G/DL (ref 6–8.5)
PROT UR QL STRIP: NEGATIVE
RBC # BLD AUTO: 5.1 10*6/MM3 (ref 4.14–5.8)
SODIUM BLD-SCNC: 138 MMOL/L (ref 136–145)
SP GR UR STRIP: 1.02 (ref 1–1.03)
UROBILINOGEN UR QL STRIP: ABNORMAL
WBC NRBC COR # BLD: 11.45 10*3/MM3 (ref 3.4–10.8)

## 2019-12-26 PROCEDURE — 80053 COMPREHEN METABOLIC PANEL: CPT

## 2019-12-26 PROCEDURE — 93010 ELECTROCARDIOGRAM REPORT: CPT | Performed by: INTERNAL MEDICINE

## 2019-12-26 PROCEDURE — 81003 URINALYSIS AUTO W/O SCOPE: CPT

## 2019-12-26 PROCEDURE — 36415 COLL VENOUS BLD VENIPUNCTURE: CPT

## 2019-12-26 PROCEDURE — 85025 COMPLETE CBC W/AUTO DIFF WBC: CPT

## 2019-12-26 PROCEDURE — 71046 X-RAY EXAM CHEST 2 VIEWS: CPT

## 2019-12-26 PROCEDURE — 93005 ELECTROCARDIOGRAM TRACING: CPT

## 2020-01-24 ENCOUNTER — DOCUMENTATION (OUTPATIENT)
Dept: PHYSICAL THERAPY | Facility: CLINIC | Age: 70
End: 2020-01-24

## 2020-01-24 DIAGNOSIS — M25.561 RIGHT KNEE PAIN, UNSPECIFIED CHRONICITY: Primary | ICD-10-CM

## 2020-01-24 NOTE — PROGRESS NOTES
PHYSICAL THERAPY DISCHARGE SUMMARY     Mike Cox was seen for 2 physical therapy sessions for R knee pain.  Treatment included therapeutic exercise, aquatic therapy and patient education with home exercise program . Progress to physical therapy goals was good.  He was discharged to an independent HEP and provided patient education to self-manage condition.       ST weeks  1. Patient will be independent with education for symptom management, joint protection and strategies to minimize stress on affected tissues - part met  2. Pt to tolerate 30 min of aquatic therapy session with reduction of pain after treatment for about 12 hours - unknown    LT wks  1. Pt to improve R knee flexion to 125 deg for greater ease with sitting and on the stairs- unable to measure  2. Pt to be independent with progressive aquatic therapy program to continue on his own at [facility] - unknown    Makeda Steele, PT

## 2020-03-11 RX ORDER — ATORVASTATIN CALCIUM 20 MG/1
20 TABLET, FILM COATED ORAL DAILY
Qty: 90 TABLET | Refills: 0 | Status: SHIPPED | OUTPATIENT
Start: 2020-03-11 | End: 2022-05-23 | Stop reason: DRUGHIGH

## 2020-04-27 RX ORDER — LISINOPRIL 20 MG/1
TABLET ORAL
Qty: 90 TABLET | Refills: 0 | Status: SHIPPED | OUTPATIENT
Start: 2020-04-27

## 2020-05-26 ENCOUNTER — TELEPHONE (OUTPATIENT)
Dept: FAMILY MEDICINE CLINIC | Facility: CLINIC | Age: 70
End: 2020-05-26

## 2020-05-26 NOTE — TELEPHONE ENCOUNTER
PATIENT IS WANTING A PHYSICAL COPY OF HIS MEDICAL RECORDS TO BE ABLE TO CHANGE PROVIDERS.     PLEASE CALL PATIENT AND ADVISE HIM ON HOW TO OBTAIN THESE PHYSICAL RECORDS.     PATIENT CAN BE REACHED AT [phone]

## 2020-05-26 NOTE — TELEPHONE ENCOUNTER
Unable to reach pt. NATALIEM stating he can come to office to sign for his free copy and we can send to [facility] or have his new PCP request the records.

## 2020-06-11 RX ORDER — ATORVASTATIN CALCIUM 20 MG/1
20 TABLET, FILM COATED ORAL DAILY
Qty: 90 TABLET | Refills: 0 | OUTPATIENT
Start: 2020-06-11

## 2020-06-26 RX ORDER — NEBIVOLOL HYDROCHLORIDE 10 MG/1
TABLET ORAL
Qty: 90 TABLET | Refills: 0 | Status: SHIPPED | OUTPATIENT
Start: 2020-06-26 | End: 2020-06-29

## 2020-06-29 RX ORDER — NEBIVOLOL HYDROCHLORIDE 10 MG/1
TABLET ORAL
Qty: 90 TABLET | Refills: 0 | Status: SHIPPED | OUTPATIENT
Start: 2020-06-29 | End: 2020-11-30

## 2020-07-31 ENCOUNTER — OFFICE VISIT (OUTPATIENT)
Dept: CARDIOLOGY | Facility: CLINIC | Age: 70
End: 2020-07-31

## 2020-07-31 ENCOUNTER — TRANSCRIBE ORDERS (OUTPATIENT)
Dept: ADMINISTRATIVE | Facility: HOSPITAL | Age: 70
End: 2020-07-31

## 2020-07-31 VITALS
SYSTOLIC BLOOD PRESSURE: 122 MMHG | DIASTOLIC BLOOD PRESSURE: 72 MMHG | HEIGHT: 72 IN | WEIGHT: 215 LBS | HEART RATE: 77 BPM | BODY MASS INDEX: 29.12 KG/M2

## 2020-07-31 DIAGNOSIS — Z13.9 VISIT FOR SCREENING: Primary | ICD-10-CM

## 2020-07-31 DIAGNOSIS — I10 ESSENTIAL HYPERTENSION: Primary | ICD-10-CM

## 2020-07-31 DIAGNOSIS — E78.5 HYPERLIPIDEMIA, UNSPECIFIED HYPERLIPIDEMIA TYPE: ICD-10-CM

## 2020-07-31 DIAGNOSIS — E11.9 TYPE 2 DIABETES MELLITUS WITHOUT COMPLICATION, WITHOUT LONG-TERM CURRENT USE OF INSULIN (HCC): ICD-10-CM

## 2020-07-31 DIAGNOSIS — I70.90 ARTERIOSCLEROTIC VASCULAR DISEASE: ICD-10-CM

## 2020-07-31 PROBLEM — Z86.19 HISTORY OF HEPATITIS C: Status: ACTIVE | Noted: 2020-02-21

## 2020-07-31 PROBLEM — E87.5 HYPERKALEMIA: Status: ACTIVE | Noted: 2020-05-31

## 2020-07-31 PROBLEM — Z87.39 H/O: GOUT: Status: ACTIVE | Noted: 2020-02-21

## 2020-07-31 PROBLEM — F33.0 MILD RECURRENT MAJOR DEPRESSION: Status: ACTIVE | Noted: 2020-02-21

## 2020-07-31 PROBLEM — D72.828 NEUTROPHILIA: Status: ACTIVE | Noted: 2020-05-31

## 2020-07-31 PROBLEM — H91.90 HOH (HARD OF HEARING): Status: ACTIVE | Noted: 2020-02-21

## 2020-07-31 PROBLEM — E87.1 HYPONATREMIA: Status: ACTIVE | Noted: 2020-05-31

## 2020-07-31 PROBLEM — F19.11 H/O MIXED DRUG ABUSE (HCC): Status: ACTIVE | Noted: 2020-02-21

## 2020-07-31 PROBLEM — R41.3 MEMORY DIFFICULTIES: Status: ACTIVE | Noted: 2020-02-21

## 2020-07-31 PROBLEM — N40.0 BENIGN PROSTATIC HYPERPLASIA: Status: ACTIVE | Noted: 2020-02-21

## 2020-07-31 PROBLEM — R79.89 ELEVATED TSH: Status: ACTIVE | Noted: 2020-05-31

## 2020-07-31 PROBLEM — D72.9 NEUTROPHILIA: Status: ACTIVE | Noted: 2020-05-31

## 2020-07-31 PROCEDURE — 93000 ELECTROCARDIOGRAM COMPLETE: CPT | Performed by: INTERNAL MEDICINE

## 2020-07-31 PROCEDURE — 99214 OFFICE O/P EST MOD 30 MIN: CPT | Performed by: INTERNAL MEDICINE

## 2020-07-31 NOTE — PROGRESS NOTES
Date of Office Visit: 2020  Encounter Provider: Isabelle Flores MD  Place of Service: [facility] CARDIOLOGY  Patient Name: Mike Cox  :1950      Patient ID:  Mike Cox is a 69 y.o. male is here for  followup for cardiac risks.         History of Present Illness       He has hypertension, hyperlipidemia, diabetes mellitus type 2, and obstructive sleep  apnea. He first presented to the office for chest pain. Because of his cardiovascular  risks, he had an exercise nuclear stress study done in  which was negative for  ischemia. He had a lot of fatigue at that time and palpitations which were treated and  got better. His Holter recording done in 2008 showed premature ventricular complexes.   He had vascular screening done on 2009 which was normal. He had a cardiac duplex   study performed on 2011 which showed moderate intimal thickening of the right   common carotid artery with mild thickening of that carotid bulb but no thickening of his stenosis.   He presented in 2008 with fatigue and had a stress nuclear perfusion study showing no evidence   of ischemia. His echocardiogram then showed an ejection fraction of 65% with grade I diastolic dysfunction  and no significant valvular heart problems.     He had a stress nuclear perfusion study done on 2015,  showing no evidence of ischemia and ejection fraction of 57%. He had a 2-D echocardiogram  with Doppler done on 2014, showing an ejection fraction of 54% and grade 1 diastolic  dysfunction. He had carotid duplex studies done on 2015, which showed moderate  intimal thickening of the right internal carotid artery and moderate plaque in the left  internal carotid artery.     The patient had vascular screening done 2017, which was normal. He had normal vascular screening done 2018.         He had a stress echocardiogram done 18 showing baseline ejection  fraction 59% with postexercise ejection fraction of 68% with septal hypokinesis which may be secondary to bundle branch block.  There was noted to be grade 1 diastolic dysfunction and no valvular disease. This was done for dyspnea on exertion.  cardiac catheterization done 5/2/18 which showed normal left main, large dominant normal circumflex, small nondominant RCA, 10-20% proximal LAD stenosis with normal mid and distal LAD.  30-40% first diagonal stenosis.  Normal left ventricular systolic function.    On 5/9/2020 show glucose 166, sodium 129, potassium 5.2, otherwise normal CMP, TSH elevated at 5.24, HDL 51, LDL 76, normal CBC.  He had a normal right lower extremity venous duplex study done 11/5/2019.    He has no chest pain or pressure.  He has had no tachycardia, palpitations or syncope.  His energy level is stable.  He has been struggling with some vertigo.  He has had no fevers, chills or cough and is taking his medications as directed.  He is now on insulin for diabetes.  His blood sugars have been higher than he would like despite being compliant with his medications.  He has had no orthopnea or PND.  He remains active.    Past Medical History:   Diagnosis Date   • Arteriosclerotic vascular disease    • BPH (benign prostatic hyperplasia)    • Carotid artery disease (CMS/[facility])     RIGHT   • Colon polyp    • Diabetes mellitus (CMS/[facility]) 2002    type II, pt reports A1C of 7.1 in 3/2016   • Dyspnea on exertion     with fatigue   • Gout    • Heart murmur    • Hemorrhoids    • High cholesterol    • History of hepatitis C     In remission after Interferon and Ribavirin treatment   • HSV-2 infection    • Hyperlipidemia    • Hypertension    • PAULETTE (obstructive sleep apnea)     USES BIPAP, 2018 resolved, no longer uses cpap   • Peroneal tendonitis     LEFT   • Plantar fasciitis    • Substance abuse (CMS/[facility])     H/O DRUG ABUSE, NOW SOBER( IV DRUG USE)   • Substance abuse (CMS/[facility]) 1987    H/O ALCOHOL ABUSE   • Vitamin B  12 deficiency    • Vitamin D deficiency          Past Surgical History:   Procedure Laterality Date   • ANKLE LIGAMENT RECONSTRUCTION Left 6/10/2019    Procedure: PERONEAL TENDON RECONSTRUCTION AND LEFT 5TH METATARSAL OSTECTOMY;  Surgeon: Ravi Ruby MD;  Location: [facility] OR [facility];  Service: Orthopedics   • CARDIAC CATHETERIZATION N/A 5/2/2018    Procedure: Coronary angiography;  Surgeon: Maik Preciado MD;  Location: [facility] CATH INVASIVE LOCATION;  Service: Cardiovascular   • CARDIAC CATHETERIZATION N/A 5/2/2018    Procedure: Left ventriculography;  Surgeon: Maik Preciado MD;  Location: [facility] CATH INVASIVE LOCATION;  Service: Cardiovascular   • COLONOSCOPY N/A 09/10/2013    Normal, repeat in 10 years-Dr. Svetlana Ornelas   • COLONOSCOPY N/A 11/17/2006    Normal, repeat in 5 years-Dr. Jackson Denise   • COLONOSCOPY N/A 08/25/2009    Normal, repeat in 5 years-Dr. Jackson Denise   • COLONOSCOPY N/A 4/2/2018    Procedure: COLONOSCOPY into cecum and ti;  Surgeon: Svetlana Ornelas MD;  Location: [facility] ENDOSCOPY;  Service: Gastroenterology   • CYST REMOVAL N/A 06/22/2015    ON FOREHEAD, EPIDERMOID/SEBACEOUS, DR. SAHIL PFEIFFER   • CYSTOSCOPY     • SHOULDER SURGERY Bilateral 1995    BONE SPURS, DR. TYRA HESS   • TENNIS ELBOW RELEASE Right 7/18/2017    Procedure: RIGHT ELBOW OPEN FLEXOR TENDON DEBRIDEMENT AND REPAIR;  Surgeon: FABIO Hess MD;  Location: [facility] OR [facility];  Service:        Current Outpatient Medications on File Prior to Visit   Medication Sig Dispense Refill   • Mirabegron ER (Myrbetriq) 50 MG tablet sustained-release 24 hour 24 hr tablet TK 1 T PO QD     • ACCU-CHEK FASTCLIX LANCETS misc TEST TID  5   • ACCU-CHEK SMARTVIEW test strip USE TO TEST BLOOD SUGAR BID  6   • aspirin (ASPIRIN ADULT LOW STRENGTH) 81 MG EC tablet Take 1 tablet by mouth Daily.     • atorvastatin (LIPITOR) 20 MG tablet TAKE 1 TABLET BY MOUTH DAILY 90 tablet 0   • budesonide-formoterol (SYMBICORT) 160-4.5 MCG/ACT inhaler Inhale 2  puffs 2 (Two) Times a Day. 6 g 0   • buPROPion XL (WELLBUTRIN XL) 150 MG 24 hr tablet Take 150 mg by mouth Daily.     • BYSTOLIC 10 MG tablet TAKE 1 TABLET BY MOUTH DAILY 90 tablet 0   • cholecalciferol (VITAMIN D3) 1000 units tablet Take 2,000 Units by mouth Daily.     • glimepiride (AMARYL) 4 MG tablet Take 4 mg by mouth Every Morning Before Breakfast.  5   • hydrocortisone (PROCTOZONE-HC) 2.5 % rectal cream Insert  into the rectum Daily. AS NEEDED FOR HEMORRHOIDS (Patient taking differently: Insert 1 application into the rectum Daily. AS NEEDED FOR HEMORRHOIDS) 28.35 g 0   • LANTUS SOLOSTAR 100 UNIT/ML injection pen      • Linagliptin-Metformin HCl (JENTADUETO) 2.5-1000 MG tablet Take 1 tablet by mouth 2 (Two) Times a Day. 60 tablet    • lisinopril (PRINIVIL,ZESTRIL) 20 MG tablet TAKE 1 TABLET BY MOUTH EVERY DAY 90 tablet 0   • meloxicam (MOBIC) 7.5 MG tablet Take 7.5 mg by mouth Daily.     • Omega-3 Fatty Acids (FISH OIL) 1000 MG capsule capsule Take 1,000 mg by mouth Daily With Breakfast.     • PROCTOZONE-HC 2.5 % rectal cream INSERT RECTALLY TWICE DAILY 30 g 0   • tamsulosin (FLOMAX) 0.4 MG capsule 24 hr capsule Take 0.8 mg by mouth Daily.       No current facility-administered medications on file prior to visit.        Social History     Socioeconomic History   • Marital status:      Spouse name: Lucy Cox   • Number of children: Not on file   • Years of education: Not on file   • Highest education level: Not on file   Occupational History   • Occupation: Psychotherapist/     Employer: OTHER     Comment: [employer]   Tobacco Use   • Smoking status: Former Smoker     Packs/day: 1.00     Years: 20.00     Pack years: 20.00     Types: Cigarettes     Start date:      Last attempt to quit:      Years since quittin.6   • Smokeless tobacco: Never Used   • Tobacco comment: Caffeine use 3 cups daily   Substance and Sexual Activity   • Alcohol use: No     Comment: sober for 32  "years   • Drug use: No     Comment: sober since 34 h/o iv drug use   • Sexual activity: Defer           Review of Systems   Constitution: Negative.   HENT: Negative for congestion.    Eyes: Negative for vision loss in left eye and vision loss in right eye.   Respiratory: Negative.  Negative for cough, hemoptysis, shortness of breath, sleep disturbances due to breathing, snoring, sputum production and wheezing.    Endocrine: Negative.    Hematologic/Lymphatic: Negative.    Skin: Negative for poor wound healing and rash.   Musculoskeletal: Negative for falls, gout, muscle cramps and myalgias.   Gastrointestinal: Negative for abdominal pain, diarrhea, dysphagia, hematemesis, melena, nausea and vomiting.   Neurological: Negative for excessive daytime sleepiness, dizziness, headaches, light-headedness, loss of balance, seizures and vertigo.   Psychiatric/Behavioral: Negative for depression and substance abuse. The patient is not nervous/anxious.        Procedures    ECG 12 Lead  Date/Time: 7/31/2020 11:23 AM  Performed by: Isabelle Flores MD  Authorized by: Isabelle Flores MD   Comparison: compared with previous ECG   Rhythm: sinus rhythm  Conduction: left anterior fascicular block    Clinical impression: abnormal EKG                Objective:      Vitals:    07/31/20 1118 07/31/20 1119   BP: 120/88 122/72   BP Location: Left arm Right arm   Pulse:  77   Weight:  97.5 kg (215 lb)   Height:  182.9 cm (72\")     Body mass index is 29.16 kg/m².    Physical Exam   Constitutional: He is oriented to person, place, and time. He appears well-developed and well-nourished. No distress.   HENT:   Head: Normocephalic and atraumatic.   Eyes: Conjunctivae are normal. No scleral icterus.   Neck: Neck supple. No JVD present. Carotid bruit is not present. No thyromegaly present.   Cardiovascular: Normal rate, regular rhythm, S1 normal, S2 normal and intact distal pulses.  No extrasystoles are present. PMI is not displaced. " Exam reveals no gallop.   Murmur heard.   Midsystolic murmur is present with a grade of 2/6 at the upper left sternal border.  Pulses:       Carotid pulses are 2+ on the right side, and 2+ on the left side.       Radial pulses are 2+ on the right side, and 2+ on the left side.        Dorsalis pedis pulses are 2+ on the right side, and 2+ on the left side.        Posterior tibial pulses are 2+ on the right side, and 2+ on the left side.   Pulmonary/Chest: Effort normal and breath sounds normal. No respiratory distress. He has no wheezes. He has no rhonchi. He has no rales. He exhibits no tenderness.   Abdominal: Soft. Bowel sounds are normal. He exhibits no distension, no abdominal bruit and no mass. There is no tenderness.   Musculoskeletal: He exhibits no edema or deformity.   Lymphadenopathy:     He has no cervical adenopathy.   Neurological: He is alert and oriented to person, place, and time. No cranial nerve deficit.   Skin: Skin is warm and dry. No rash noted. He is not diaphoretic. No cyanosis. No pallor. Nails show no clubbing.   Psychiatric: He has a normal mood and affect. Judgment normal.   Vitals reviewed.      Lab Review:       Assessment:      Diagnosis Plan   1. Essential hypertension     2. Hyperlipidemia, unspecified hyperlipidemia type     3. Type 2 diabetes mellitus without complication, without long-term current use of insulin (CMS/[facility])     4. Arteriosclerotic vascular disease        1. Normal stress perfusion study 8/2012 and 1/2015.  Abnormal stress echocardiogram done 2/2018, very mild CAD on cath 5/2/18, no angina.   2. Abnormal ECG. Left anterior fascicular block which is chronic.  3. Hypertension, goal <120/80. .   4. Diabetes mellitus type 2, on insulin. Stable.  5. Obstructive sleep apnea on CPAP. Getting retested for this.  6. Hyperlipidemia. On atorvastatin.   7. Hepatitis C, stable.  8. History of rheumatoid fever, stable. No valvular heart problems.   9. History of gout.  10. Plaquing  of the left carotid artery.  11.  Elevated TSH.      Plan:       See back in 6 months, no med changes but get thyroid checked with endocrinology.  Set up vascular screening.

## 2020-08-06 ENCOUNTER — TELEPHONE (OUTPATIENT)
Dept: CARDIOLOGY | Facility: CLINIC | Age: 70
End: 2020-08-06

## 2020-08-06 PROBLEM — I44.4 LAFB (LEFT ANTERIOR FASCICULAR BLOCK): Status: ACTIVE | Noted: 2020-08-06

## 2020-08-06 RX ORDER — LISINOPRIL 20 MG/1
20 TABLET ORAL DAILY
Qty: 90 TABLET | Refills: 1 | OUTPATIENT
Start: 2020-08-06

## 2020-08-06 NOTE — TELEPHONE ENCOUNTER
Pt called and states that he saw his ecg result on mychart. He want to know does he need to worry about because its abnormal?        Thanks  Donna SOLIS

## 2020-08-06 NOTE — TELEPHONE ENCOUNTER
I called patient back and explained to him his last EKG.  This shows normal sinus rhythm with a left anterior fascicular block which is been unchanged for years.  He verbalized understanding.

## 2020-08-17 ENCOUNTER — HOSPITAL ENCOUNTER (OUTPATIENT)
Dept: CARDIOLOGY | Facility: HOSPITAL | Age: 70
Discharge: HOME | End: 2020-08-17
Admitting: INTERNAL MEDICINE

## 2020-08-17 VITALS
BODY MASS INDEX: 28.23 KG/M2 | HEART RATE: 80 BPM | WEIGHT: 213 LBS | HEIGHT: 73 IN | DIASTOLIC BLOOD PRESSURE: 80 MMHG | SYSTOLIC BLOOD PRESSURE: 138 MMHG

## 2020-08-17 DIAGNOSIS — Z13.9 VISIT FOR SCREENING: ICD-10-CM

## 2020-08-17 LAB
BH CV ECHO MEAS - DIST AO DIAM: 1.47 CM
BH CV VAS BP LEFT ARM: NORMAL MMHG
BH CV VAS BP RIGHT ARM: NORMAL MMHG
BH CV XLRA MEAS - MID AO DIAM: 1.55 CM
BH CV XLRA MEAS - PAD LEFT ABI DP: 1.1
BH CV XLRA MEAS - PAD LEFT ABI PT: 1.16
BH CV XLRA MEAS - PAD LEFT ARM: 136 MMHG
BH CV XLRA MEAS - PAD LEFT LEG DP: 152 MMHG
BH CV XLRA MEAS - PAD LEFT LEG PT: 160 MMHG
BH CV XLRA MEAS - PAD RIGHT ABI DP: 1.16
BH CV XLRA MEAS - PAD RIGHT ABI PT: 1.18
BH CV XLRA MEAS - PAD RIGHT ARM: 138 MMHG
BH CV XLRA MEAS - PAD RIGHT LEG DP: 160 MMHG
BH CV XLRA MEAS - PAD RIGHT LEG PT: 164 MMHG
BH CV XLRA MEAS - PROX AO DIAM: 1.96 CM
BH CV XLRA MEAS LEFT ICA/CCA RATIO: 1.04
BH CV XLRA MEAS LEFT MID CCA PSV: NORMAL CM/SEC
BH CV XLRA MEAS LEFT MID ICA PSV: NORMAL CM/SEC
BH CV XLRA MEAS LEFT PROX ECA PSV: NORMAL CM/SEC
BH CV XLRA MEAS RIGHT ICA/CCA RATIO: 0.77
BH CV XLRA MEAS RIGHT MID CCA PSV: NORMAL CM/SEC
BH CV XLRA MEAS RIGHT MID ICA PSV: NORMAL CM/SEC
BH CV XLRA MEAS RIGHT PROX ECA PSV: NORMAL CM/SEC

## 2020-08-17 PROCEDURE — 93799 UNLISTED CV SVC/PROCEDURE: CPT

## 2020-08-18 ENCOUNTER — TELEPHONE (OUTPATIENT)
Dept: CARDIOLOGY | Facility: CLINIC | Age: 70
End: 2020-08-18

## 2020-08-18 NOTE — TELEPHONE ENCOUNTER
Notified patient of results. He verbalized understanding.    Chiqui Sánchez, RN  Triage [facility]

## 2020-09-04 ENCOUNTER — LAB (OUTPATIENT)
Dept: LAB | Facility: HOSPITAL | Age: 70
End: 2020-09-04

## 2020-09-04 ENCOUNTER — TRANSCRIBE ORDERS (OUTPATIENT)
Dept: ADMINISTRATIVE | Facility: HOSPITAL | Age: 70
End: 2020-09-04

## 2020-09-04 DIAGNOSIS — E11.65 TYPE II DIABETES MELLITUS WITH HYPEROSMOLARITY, UNCONTROLLED (HCC): Primary | ICD-10-CM

## 2020-09-04 DIAGNOSIS — I10 ESSENTIAL HYPERTENSION, MALIGNANT: ICD-10-CM

## 2020-09-04 DIAGNOSIS — R68.89 OTHER GENERAL SYMPTOMS AND SIGNS: ICD-10-CM

## 2020-09-04 DIAGNOSIS — E78.2 HYPERLIPIDEMIA, MIXED: ICD-10-CM

## 2020-09-04 DIAGNOSIS — E11.00 TYPE II DIABETES MELLITUS WITH HYPEROSMOLARITY, UNCONTROLLED (HCC): ICD-10-CM

## 2020-09-04 DIAGNOSIS — E11.65 TYPE II DIABETES MELLITUS WITH HYPEROSMOLARITY, UNCONTROLLED (HCC): ICD-10-CM

## 2020-09-04 DIAGNOSIS — E11.00 TYPE II DIABETES MELLITUS WITH HYPEROSMOLARITY, UNCONTROLLED (HCC): Primary | ICD-10-CM

## 2020-09-04 LAB
ALBUMIN SERPL-MCNC: 4.2 G/DL (ref 3.5–5.2)
ALBUMIN UR-MCNC: <1.2 MG/DL
ALBUMIN/GLOB SERPL: 1.6 G/DL
ALP SERPL-CCNC: 122 U/L (ref 39–117)
ALT SERPL W P-5'-P-CCNC: 22 U/L (ref 1–41)
ANION GAP SERPL CALCULATED.3IONS-SCNC: 11.3 MMOL/L (ref 5–15)
AST SERPL-CCNC: 22 U/L (ref 1–40)
BILIRUB SERPL-MCNC: 0.5 MG/DL (ref 0–1.2)
BUN SERPL-MCNC: 12 MG/DL (ref 8–23)
BUN/CREAT SERPL: 16.4 (ref 7–25)
CALCIUM SPEC-SCNC: 9.3 MG/DL (ref 8.6–10.5)
CHLORIDE SERPL-SCNC: 99 MMOL/L (ref 98–107)
CHOLEST SERPL-MCNC: 116 MG/DL (ref 0–200)
CO2 SERPL-SCNC: 25.7 MMOL/L (ref 22–29)
CREAT SERPL-MCNC: 0.73 MG/DL (ref 0.76–1.27)
CREAT UR-MCNC: 128.7 MG/DL
GFR SERPL CREATININE-BSD FRML MDRD: 106 ML/MIN/1.73
GLOBULIN UR ELPH-MCNC: 2.7 GM/DL
GLUCOSE SERPL-MCNC: 150 MG/DL (ref 65–99)
HBA1C MFR BLD: 7.08 % (ref 4.8–5.6)
HDLC SERPL-MCNC: 42 MG/DL (ref 40–60)
LDLC SERPL CALC-MCNC: 41 MG/DL (ref 0–100)
LDLC/HDLC SERPL: 0.99 {RATIO}
MICROALBUMIN/CREAT UR: NORMAL MG/G{CREAT}
POTASSIUM SERPL-SCNC: 4.3 MMOL/L (ref 3.5–5.2)
PROT SERPL-MCNC: 6.9 G/DL (ref 6–8.5)
SODIUM SERPL-SCNC: 136 MMOL/L (ref 136–145)
TRIGL SERPL-MCNC: 163 MG/DL (ref 0–150)
TSH SERPL DL<=0.05 MIU/L-ACNC: 3.75 UIU/ML (ref 0.27–4.2)
VLDLC SERPL-MCNC: 32.6 MG/DL (ref 5–40)

## 2020-09-04 PROCEDURE — 84443 ASSAY THYROID STIM HORMONE: CPT

## 2020-09-04 PROCEDURE — 86376 MICROSOMAL ANTIBODY EACH: CPT | Performed by: INTERNAL MEDICINE

## 2020-09-04 PROCEDURE — 82570 ASSAY OF URINE CREATININE: CPT | Performed by: INTERNAL MEDICINE

## 2020-09-04 PROCEDURE — 80061 LIPID PANEL: CPT | Performed by: INTERNAL MEDICINE

## 2020-09-04 PROCEDURE — 80053 COMPREHEN METABOLIC PANEL: CPT

## 2020-09-04 PROCEDURE — 36415 COLL VENOUS BLD VENIPUNCTURE: CPT

## 2020-09-04 PROCEDURE — 82043 UR ALBUMIN QUANTITATIVE: CPT | Performed by: INTERNAL MEDICINE

## 2020-09-04 PROCEDURE — 83036 HEMOGLOBIN GLYCOSYLATED A1C: CPT | Performed by: INTERNAL MEDICINE

## 2020-09-05 LAB — THYROPEROXIDASE AB SERPL-ACNC: <9 IU/ML (ref 0–34)

## 2020-09-11 ENCOUNTER — OFFICE VISIT (OUTPATIENT)
Dept: SURGERY | Facility: CLINIC | Age: 70
End: 2020-09-11

## 2020-09-11 VITALS — HEIGHT: 72 IN | BODY MASS INDEX: 28.71 KG/M2 | WEIGHT: 212 LBS

## 2020-09-11 DIAGNOSIS — K43.9 VENTRAL HERNIA WITHOUT OBSTRUCTION OR GANGRENE: Primary | ICD-10-CM

## 2020-09-11 PROCEDURE — 99214 OFFICE O/P EST MOD 30 MIN: CPT | Performed by: SURGERY

## 2020-09-11 NOTE — PROGRESS NOTES
Chief Complaint   Patient presents with   • Hernia        Mike Cox is a 69 y.o. male who is seen today In follow-up, after a visit in July 2016, when he presented with an asymptomatic umbilical hernia.  This was first noted in 2013 on exam by myself, but the patient says it is increasing in size, and is becoming mildly symptomatic.   In the interim, we had considered pursuing it but he chose to wait.      He says it is worse, but he really is here about his ventral diastasis.  It is not problematic as far as pain.  The symptoms of pressure and pushing out are more exaggerated when he is standing, better when recumbent.    Also complicating the potential for surgery although well controlled, are his diabetes (managed by Dr. Ireland) with hemoglobin A1c at 7.08 now, and hepatitis C in remission.  It is suspected that this was due to a tattoo or IV drug abuse at an early age, he being completely recuperated from such now.    He has a remote history of polyps, but Cscopes in 2006, 2009, 2013 and 2018 all without polyps.  He had mild sigmoid diverticulosis in 2018.  He also has a family history of colon cancer in his father's brother, diagnosed in his sixties.  We thus had planned on repeat in 2023.      Past Medical History:   Diagnosis Date   • Arteriosclerotic vascular disease    • BPH (benign prostatic hyperplasia)    • Carotid artery disease (CMS/HCC)     RIGHT   • Colon polyp    • Diabetes mellitus (CMS/HCC) 2002    type II, pt reports A1C of 7.1 in 3/2016   • Dyspnea on exertion     with fatigue   • Gout    • Heart murmur    • Hemorrhoids    • High cholesterol    • History of hepatitis C     In remission after Interferon and Ribavirin treatment   • HSV-2 infection    • Hyperlipidemia    • Hypertension    • PAULETTE (obstructive sleep apnea)     USES BIPAP, 2018 resolved, no longer uses cpap   • Peroneal tendonitis     LEFT   • Plantar fasciitis    • Substance abuse (CMS/HCC)     H/O DRUG ABUSE, NOW SOBER( IV  DRUG USE)   • Substance abuse (CMS/[facility]) 1987    H/O ALCOHOL ABUSE   • Vitamin B 12 deficiency    • Vitamin D deficiency      Past Surgical History:   Procedure Laterality Date   • ANKLE LIGAMENT RECONSTRUCTION Left 6/10/2019    Procedure: PERONEAL TENDON RECONSTRUCTION AND LEFT 5TH METATARSAL OSTECTOMY;  Surgeon: Ravi Ruby MD;  Location: [facility];  Service: Orthopedics   • CARDIAC CATHETERIZATION N/A 5/2/2018    Procedure: Coronary angiography;  Surgeon: Maik Preciado MD;  Location: [facility] CATH INVASIVE LOCATION;  Service: Cardiovascular   • CARDIAC CATHETERIZATION N/A 5/2/2018    Procedure: Left ventriculography;  Surgeon: Maik Preciado MD;  Location: [facility] CATH INVASIVE LOCATION;  Service: Cardiovascular   • COLONOSCOPY N/A 09/10/2013    Normal, repeat in 10 years-Dr. Svetlana Ornelas   • COLONOSCOPY N/A 11/17/2006    Normal, repeat in 5 years-Dr. Jackson Denise   • COLONOSCOPY N/A 08/25/2009    Normal, repeat in 5 years-Dr. Jackson Denise   • COLONOSCOPY N/A 4/2/2018    Procedure: COLONOSCOPY into cecum and ti;  Surgeon: Svetlana Ornelas MD;  Location: [facility] ENDOSCOPY;  Service: Gastroenterology   • CYST REMOVAL N/A 06/22/2015    ON FOREHEAD, EPIDERMOID/SEBACEOUS, DR. SAHIL PFEIFFER   • CYSTOSCOPY     • KNEE SURGERY Right    • SHOULDER SURGERY Bilateral 1995    BONE SPURS, DR. TYRA HESS   • TENNIS ELBOW RELEASE Right 7/18/2017    Procedure: RIGHT ELBOW OPEN FLEXOR TENDON DEBRIDEMENT AND REPAIR;  Surgeon: FABIO Hess MD;  Location: [facility] OR [facility];  Service:          Current Outpatient Medications:   •  ACCU-CHEK FASTCLIX LANCETS misc, TEST TID, Disp: , Rfl: 5  •  ACCU-CHEK SMARTVIEW test strip, USE TO TEST BLOOD SUGAR BID, Disp: , Rfl: 6  •  aspirin (ASPIRIN ADULT LOW STRENGTH) 81 MG EC tablet, Take 1 tablet by mouth Daily., Disp: , Rfl:   •  atorvastatin (LIPITOR) 20 MG tablet, TAKE 1 TABLET BY MOUTH DAILY, Disp: 90 tablet, Rfl: 0  •  buPROPion XL (WELLBUTRIN XL) 150 MG 24 hr tablet, Take 150 mg  by mouth Daily., Disp: , Rfl:   •  BYSTOLIC 10 MG tablet, TAKE 1 TABLET BY MOUTH DAILY, Disp: 90 tablet, Rfl: 0  •  glimepiride (AMARYL) 4 MG tablet, Take 4 mg by mouth Every Morning Before Breakfast., Disp: , Rfl: 5  •  LANTUS SOLOSTAR 100 UNIT/ML injection pen, Inject 21 Units under the skin into the appropriate area as directed Daily., Disp: , Rfl:   •  Linagliptin-Metformin HCl (JENTADUETO) 2.5-1000 MG tablet, Take 1 tablet by mouth 2 (Two) Times a Day., Disp: 60 tablet, Rfl:   •  lisinopril (PRINIVIL,ZESTRIL) 20 MG tablet, TAKE 1 TABLET BY MOUTH EVERY DAY, Disp: 90 tablet, Rfl: 0  •  Mirabegron ER (Myrbetriq) 50 MG tablet sustained-release 24 hour 24 hr tablet, TK 1 T PO QD, Disp: , Rfl:   •  tamsulosin (FLOMAX) 0.4 MG capsule 24 hr capsule, Take 0.8 mg by mouth Daily., Disp: , Rfl:     No Known Allergies    Family History   Problem Relation Age of Onset   • Heart disease Father    • Hypertension Father    • Alcohol abuse Father    • Colon cancer Paternal Uncle         diagnosed in 60s   • Hypertension Paternal Uncle    • Heart disease Paternal Uncle    • Diabetes Paternal Uncle    • Diabetes Sister    • Hyperlipidemia Sister    • Hypertension Sister    • Kidney disease Sister    • Lung disease Mother    • COPD Mother    • Cancer Paternal Grandmother    • Malig Hyperthermia Neg Hx      Social History     Tobacco Use   • Smoking status: Former Smoker     Packs/day: 1.00     Years: 20.00     Pack years: 20.00     Types: Cigarettes     Start date:      Last attempt to quit:      Years since quittin.7   • Smokeless tobacco: Never Used   • Tobacco comment: Caffeine use 3 cups daily   Substance Use Topics   • Alcohol use: No     Comment: sober for 32 years   • Drug use: No     Comment: sober since 34 h/o iv drug use     Occupation/Social: 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     Preventative Medicine  Colonoscopy: 2013, Dr. Ornelas. Normal colonoscopy at this time. Prior  "history of polyps, but Cscope 2006 and 2009 also normal.     Review of Systems   Respiratory: Positive for wheezing.    All other systems reviewed and are negative.    Vitals:    09/11/20 1145   Weight: 96.2 kg (212 lb)   Height: 182.9 cm (72\")     Body mass index is 28.75 kg/m².    Physical Exam   Constitutional: He is oriented to person, place, and time. He appears well-developed and well-nourished.   HENT:   Head: Normocephalic and atraumatic.   Eyes: Conjunctivae are normal. No scleral icterus.   Neck: No tracheal deviation present.   Cardiovascular: Normal rate and regular rhythm.   No murmur heard.  Pulmonary/Chest: Effort normal. He has no wheezes.   Abdominal: Soft. Bowel sounds are normal. He exhibits no distension. There is no tenderness. A hernia (umbilical defect, approximately 1.2 cm, ventral diastasis almost 3 cm) is present.   Ventral diastasis noted   Musculoskeletal: Normal range of motion. He exhibits no deformity.   Neurological: He is alert and oriented to person, place, and time.   Skin: Skin is warm and dry.   Bilateral upper extremity tattoos on his forearms   Psychiatric: He has a normal mood and affect. His behavior is normal. Judgment and thought content normal.     Impression:  · Small umbilical hernia, minimally enlarging, mildly symptomatic.  I think he could possibly go thru his entire life without having to have this repaired.    · Ventral diastasis.  This complicates matters and puts him at risk for mesh need if we operate due to the thinness of the superior fascial flap tissue.  · Family history of colon cancer in father's brother at age 60  · Personal history of colon polyps, Cscopes in 2006, 2009, 2013, 2018 without polyps.   · Diabates, most recent A1C, well controlled  · History of hepatitis C, in remission    Plan:  · Screening colonoscopy in 2023.  Recommended that he use Suprep rather than OsmoPrep, in the context of his diabetes and hypertension and potential risks for kidney " injury.    · Hold on umbilical hernia repair.  the only reason I would consider mesh is his associated ventral diastases..  · Continue activities such as sit ups as this is important to his overall health.  If the hernia gets larger with these activities, then we will proceed with surgery.     09/11/20   Svetlana Ornelas MD    >25 minutes spent with over half in counseling

## 2020-11-30 RX ORDER — NEBIVOLOL HYDROCHLORIDE 10 MG/1
TABLET ORAL
Qty: 90 TABLET | Refills: 3 | Status: SHIPPED | OUTPATIENT
Start: 2020-11-30 | End: 2021-12-13 | Stop reason: SDUPTHER

## 2020-12-08 ENCOUNTER — TRANSCRIBE ORDERS (OUTPATIENT)
Dept: ADMINISTRATIVE | Facility: HOSPITAL | Age: 70
End: 2020-12-08

## 2020-12-08 ENCOUNTER — LAB (OUTPATIENT)
Dept: LAB | Facility: HOSPITAL | Age: 70
End: 2020-12-08

## 2020-12-08 DIAGNOSIS — I10 ESSENTIAL HYPERTENSION, MALIGNANT: ICD-10-CM

## 2020-12-08 DIAGNOSIS — E11.65 TYPE II DIABETES MELLITUS WITH HYPEROSMOLARITY, UNCONTROLLED (HCC): ICD-10-CM

## 2020-12-08 DIAGNOSIS — E78.5 HYPERLIPIDEMIA, UNSPECIFIED HYPERLIPIDEMIA TYPE: ICD-10-CM

## 2020-12-08 DIAGNOSIS — E11.65 TYPE II DIABETES MELLITUS WITH HYPEROSMOLARITY, UNCONTROLLED (HCC): Primary | ICD-10-CM

## 2020-12-08 DIAGNOSIS — E11.00 TYPE II DIABETES MELLITUS WITH HYPEROSMOLARITY, UNCONTROLLED (HCC): ICD-10-CM

## 2020-12-08 DIAGNOSIS — E11.00 TYPE II DIABETES MELLITUS WITH HYPEROSMOLARITY, UNCONTROLLED (HCC): Primary | ICD-10-CM

## 2020-12-08 LAB
ALBUMIN SERPL-MCNC: 4.6 G/DL (ref 3.5–5.2)
ALBUMIN/GLOB SERPL: 1.9 G/DL
ALP SERPL-CCNC: 133 U/L (ref 39–117)
ALT SERPL W P-5'-P-CCNC: 20 U/L (ref 1–41)
ANION GAP SERPL CALCULATED.3IONS-SCNC: 11.2 MMOL/L (ref 5–15)
AST SERPL-CCNC: 19 U/L (ref 1–40)
BILIRUB SERPL-MCNC: 0.6 MG/DL (ref 0–1.2)
BUN SERPL-MCNC: 11 MG/DL (ref 8–23)
BUN/CREAT SERPL: 12.8 (ref 7–25)
CALCIUM SPEC-SCNC: 10.2 MG/DL (ref 8.6–10.5)
CHLORIDE SERPL-SCNC: 98 MMOL/L (ref 98–107)
CO2 SERPL-SCNC: 28.8 MMOL/L (ref 22–29)
CREAT SERPL-MCNC: 0.86 MG/DL (ref 0.76–1.27)
GFR SERPL CREATININE-BSD FRML MDRD: 88 ML/MIN/1.73
GLOBULIN UR ELPH-MCNC: 2.4 GM/DL
GLUCOSE SERPL-MCNC: 58 MG/DL (ref 65–99)
HBA1C MFR BLD: 7.42 % (ref 4.8–5.6)
POTASSIUM SERPL-SCNC: 4.7 MMOL/L (ref 3.5–5.2)
PROT SERPL-MCNC: 7 G/DL (ref 6–8.5)
SODIUM SERPL-SCNC: 138 MMOL/L (ref 136–145)
TSH SERPL DL<=0.05 MIU/L-ACNC: 3.39 UIU/ML (ref 0.27–4.2)

## 2020-12-08 PROCEDURE — 80053 COMPREHEN METABOLIC PANEL: CPT

## 2020-12-08 PROCEDURE — 36415 COLL VENOUS BLD VENIPUNCTURE: CPT

## 2020-12-08 PROCEDURE — 83036 HEMOGLOBIN GLYCOSYLATED A1C: CPT

## 2020-12-08 PROCEDURE — 84443 ASSAY THYROID STIM HORMONE: CPT

## 2021-02-16 ENCOUNTER — TELEPHONE (OUTPATIENT)
Dept: FAMILY MEDICINE CLINIC | Facility: CLINIC | Age: 71
End: 2021-02-16

## 2021-02-16 ENCOUNTER — TELEPHONE (OUTPATIENT)
Dept: CARDIOLOGY | Facility: CLINIC | Age: 71
End: 2021-02-16

## 2021-02-16 NOTE — TELEPHONE ENCOUNTER
Attempted to call pt. alesha full. Could not leave message. Wanted to make sure he wanted to see  for [facility] on 2/23/21. As she thought he switched to [facility].

## 2021-02-16 NOTE — TELEPHONE ENCOUNTER
"Patient called and left a voice mail for Dr Flores. When message retrieved staff referred call to triage. Patient stated that the last couple of days he has been dizzy and \"stumbling around, and that's not like me\". Also states that he is now having trouble with memory, especially short term. Did not report any headaches. chest pain/SOA/Palpitations.  He has hypertension, hyperlipidemia, diabetes mellitus type 2, and obstructive sleep apnea.    I explained to patient that issue could be neurological in nature and advised him to go to the ED to be evaluated. Patient stated he was leery of going to the hospital because \"they may have a Covid shirley\". Reassured patient that [facility] follows all regulations required to keep our patients safe. Explained we distance and require patients/visitors to wear mask and use proper hand hygiene. He agreed to proceed to ED.    Angela Mehta RN  [facility]     "

## 2021-02-26 ENCOUNTER — OFFICE VISIT (OUTPATIENT)
Dept: CARDIOLOGY | Facility: CLINIC | Age: 71
End: 2021-02-26

## 2021-02-26 VITALS
SYSTOLIC BLOOD PRESSURE: 130 MMHG | RESPIRATION RATE: 16 BRPM | HEIGHT: 72 IN | DIASTOLIC BLOOD PRESSURE: 90 MMHG | HEART RATE: 77 BPM | WEIGHT: 212 LBS | OXYGEN SATURATION: 99 % | BODY MASS INDEX: 28.71 KG/M2

## 2021-02-26 DIAGNOSIS — R06.09 DYSPNEA ON EXERTION: ICD-10-CM

## 2021-02-26 DIAGNOSIS — I44.4 LAFB (LEFT ANTERIOR FASCICULAR BLOCK): ICD-10-CM

## 2021-02-26 DIAGNOSIS — R42 DIZZINESS: ICD-10-CM

## 2021-02-26 DIAGNOSIS — I70.90 ARTERIOSCLEROTIC VASCULAR DISEASE: ICD-10-CM

## 2021-02-26 DIAGNOSIS — E78.5 HYPERLIPIDEMIA, UNSPECIFIED HYPERLIPIDEMIA TYPE: ICD-10-CM

## 2021-02-26 DIAGNOSIS — I10 ESSENTIAL HYPERTENSION: Primary | ICD-10-CM

## 2021-02-26 PROCEDURE — 93000 ELECTROCARDIOGRAM COMPLETE: CPT | Performed by: NURSE PRACTITIONER

## 2021-02-26 PROCEDURE — 99214 OFFICE O/P EST MOD 30 MIN: CPT | Performed by: NURSE PRACTITIONER

## 2021-02-26 RX ORDER — BENZONATATE 200 MG/1
CAPSULE ORAL
COMMUNITY
Start: 2020-11-28 | End: 2022-05-20

## 2021-02-26 RX ORDER — MECLIZINE HCL 12.5 MG/1
12.5 TABLET ORAL
COMMUNITY
Start: 2021-02-17 | End: 2021-03-19

## 2021-02-26 RX ORDER — HYDROCORTISONE 25 MG/G
CREAM TOPICAL
COMMUNITY
Start: 2020-10-26 | End: 2021-12-21

## 2021-02-26 RX ORDER — BETAMETHASONE DIPROPIONATE 0.05 %
OINTMENT (GRAM) TOPICAL
COMMUNITY
Start: 2021-02-04 | End: 2022-05-20

## 2021-02-26 NOTE — PROGRESS NOTES
Date of Office Visit: 2021  Encounter Provider: TOOTIE Rivera  Place of Service: [facility] CARDIOLOGY  Patient Name: Mike Cox  :1950  Primary Cardiologist: Dr. Flores    CC:  Follow up    Dear Dr. Salas    HPI: Mike Cox is a pleasant 70 y.o. male who presents 2021 for cardiac follow up.  He is a new patient to me and I have reviewed his past medical records.  He is a patient of Dr. Flores.     He has hypertension, hyperlipidemia, diabetes mellitus type 2, and obstructive sleep apnea. He first presented to the office for chest pain. Because of his cardiovascular risks, he had an exercise nuclear stress study done in  which was negative for ischemia. He had a lot of fatigue at that time and palpitations which were treated and got better. His Holter recording done in 2008 showed premature ventricular complexes.  He had vascular screening done on 2009 which was normal. He had a cardiac duplex  study performed on 2011 which showed moderate intimal thickening of the right  common carotid artery with mild thickening of that carotid bulb but no thickening of his stenosis.  He presented in 2008 with fatigue and had a stress nuclear perfusion study showing no evidence  of ischemia. His echocardiogram then showed an ejection fraction of 65% with grade I diastolic dysfunction and no significant valvular heart problems.      He had a stress nuclear perfusion study done on 2015, showing no evidence of ischemia and ejection fraction of 57%. He had a 2-D echocardiogram with Doppler done on 2014, showing an ejection fraction of 54% and grade 1 diastolic  dysfunction. He had carotid duplex studies done on 2015, which showed moderate intimal thickening of the right internal carotid artery and moderate plaque in the left internal carotid artery.      The patient had vascular screening done 2017, which was  normal. He had normal vascular screening done 1/2018.       He had a stress echocardiogram done 2/1/18 showing baseline ejection fraction 59% with postexercise ejection fraction of 68% with septal hypokinesis which may be secondary to bundle branch block.  There was noted to be grade 1 diastolic dysfunction and no valvular disease. This was done for dyspnea on exertion.  cardiac catheterization done 5/2/18 which showed normal left main, large dominant normal circumflex, small nondominant RCA, 10-20% proximal LAD stenosis with normal mid and distal LAD.  30-40% first diagonal stenosis.  Normal left ventricular systolic function.       He had a normal right lower extremity venous duplex study done 11/5/2019.     He presents today in follow-up.  He states he had some vertigo approximately 6 months ago.  That resolved.  He then he states about 3 weeks ago he started feeling off balance and dizzy.  He wound up presenting to [facility] on 2/16/2021.  His biggest complaint was dizziness.  CT and M RI of the head as follows    CT head 2/16/2021 -IMPRESSION:  Ill-defined hypodensity within the right basal ganglia, age indeterminate infarct. Other scattered hypodensities within the periventricular deep white matter which are nonspecific and may represent chronic small vessel ischemic change. MRI brain with   diffusion weighted sequence could be used for further evaluation for acute ischemic change.     MRI - IMPRESSION:   1. There is no noncontrast MR evidence of an acute intracranial abnormality. Specifically, there is no parenchymal diffusion restriction to suggest acute infarct/ischemia.  2. Findings compatible with a background of mild to moderate generalized parenchymal volume loss and mild chronic small vessel ischemic changes.    He was kept overnight, no medication changes were made and he was discharged home.  He did have labs on 2/15/2020 that showed a BMP with a potassium of 5.2 and glucose 137, otherwise  "unremarkable.  His CBC was unremarkable.  Repeat on 216 showed again total normal CBC and a BMP was a glucose of 189 and a normal potassium of 4.6.  His sodium was 136.    He presents here for follow-up.  He denies any palpitations, lower extremity edema, chest pain or chest pressure.  He has not had any syncope or presyncopal episodes.  He has not fallen for any reason.  He denies any fatigue.  He states he does still have dizziness mostly it is with movement.  He feels like he is spinning.  He also states he feels not necessarily short of breath but feels like he has to gasp at times because he just \"needs more air\".  He states his blood pressure at home is normally 110/70.  Today it is mildly elevated 130/90.  He has been taking his medications as directed.  His biggest issue remains the dizziness and feeling off balance.  He also had vascular screening performed in August 2020:    [employer] Tech Notes: Previous screening 6-6-17.    Carotid Artery Disease and Stroke Screening:   The right carotid artery has plaque without stenosis.  The left carotid artery has plaque without stenosis.     Abdominal Aortic Aneurysm (AAA) Screening:   Maximum proximal diameter of 1.96 cm.  The artery was normal.     Peripheral Artery Disease (P.A.D.) Screening:  The right has normal arterial pressures.  The left has normal arterial pressures.    The pedal pulses are normal triphasic bilateral.         Past Medical History:   Diagnosis Date   • Arteriosclerotic vascular disease    • BPH (benign prostatic hyperplasia)    • Carotid artery disease (CMS/HCC)     RIGHT   • Colon polyp    • Diabetes mellitus (CMS/HCC) 2002    type II, pt reports A1C of 7.1 in 3/2016   • Dyspnea on exertion     with fatigue   • Gout    • Heart murmur    • Hemorrhoids    • High cholesterol    • History of hepatitis C     In remission after Interferon and Ribavirin treatment   • HSV-2 infection    • Hyperlipidemia    • Hypertension    • PAULETTE (obstructive " sleep apnea)     USES BIPAP, 2018 resolved, no longer uses cpap   • Peroneal tendonitis     LEFT   • Plantar fasciitis    • Substance abuse (CMS/[facility])     H/O DRUG ABUSE, NOW SOBER( IV DRUG USE)   • Substance abuse (CMS/[facility]) 1987    H/O ALCOHOL ABUSE   • Vitamin B 12 deficiency    • Vitamin D deficiency        Past Surgical History:   Procedure Laterality Date   • ANKLE LIGAMENT RECONSTRUCTION Left 6/10/2019    Procedure: PERONEAL TENDON RECONSTRUCTION AND LEFT 5TH METATARSAL OSTECTOMY;  Surgeon: Ravi Ruby MD;  Location: [facility] OR [facility] [place];  Service: Orthopedics   • CARDIAC CATHETERIZATION N/A 5/2/2018    Procedure: Coronary angiography;  Surgeon: Maik Preciado MD;  Location: [facility] CATH INVASIVE LOCATION;  Service: Cardiovascular   • CARDIAC CATHETERIZATION N/A 5/2/2018    Procedure: Left ventriculography;  Surgeon: Maik Preciado MD;  Location: [facility] CATH INVASIVE LOCATION;  Service: Cardiovascular   • COLONOSCOPY N/A 09/10/2013    Normal, repeat in 10 years-Dr. Svetlana Ornelas   • COLONOSCOPY N/A 11/17/2006    Normal, repeat in 5 years-Dr. Jackson Denise   • COLONOSCOPY N/A 08/25/2009    Normal, repeat in 5 years-Dr. Jackson Denise   • COLONOSCOPY N/A 4/2/2018    Procedure: COLONOSCOPY into cecum and ti;  Surgeon: Svetlana Ornelas MD;  Location: [facility] ENDOSCOPY;  Service: Gastroenterology   • CYST REMOVAL N/A 06/22/2015    ON FOREHEAD, EPIDERMOID/SEBACEOUS, DR. SAHIL PFEIFFER   • CYSTOSCOPY     • KNEE SURGERY Right    • SHOULDER SURGERY Bilateral 1995    BONE SPURS, DR. TYRA HESS   • TENNIS ELBOW RELEASE Right 7/18/2017    Procedure: RIGHT ELBOW OPEN FLEXOR TENDON DEBRIDEMENT AND REPAIR;  Surgeon: FABIO Hess MD;  Location: [facility] OR [facility] [place];  Service:        Social History     Socioeconomic History   • Marital status:      Spouse name: Lucy Cox   • Number of children: Not on file   • Years of education: Not on file   • Highest education level: Not on file   Occupational History   • Occupation:  Psychotherapist/     Employer: OTHER     Comment: [employer]   Tobacco Use   • Smoking status: Former Smoker     Packs/day: 1.00     Years: 20.00     Pack years: 20.00     Types: Cigarettes     Start date:      Quit date:      Years since quittin.1   • Smokeless tobacco: Never Used   • Tobacco comment: Caffeine use 3 cups daily   Substance and Sexual Activity   • Alcohol use: No     Comment: sober for 32 years   • Drug use: No     Comment: sober since 35 h/o iv drug use   • Sexual activity: Defer       Family History   Problem Relation Age of Onset   • Heart disease Father    • Hypertension Father    • Alcohol abuse Father    • Colon cancer Paternal Uncle         diagnosed in 60s   • Hypertension Paternal Uncle    • Heart disease Paternal Uncle    • Diabetes Paternal Uncle    • Diabetes Sister    • Hyperlipidemia Sister    • Hypertension Sister    • Kidney disease Sister    • Lung disease Mother    • COPD Mother    • Cancer Paternal Grandmother    • Malig Hyperthermia Neg Hx        The following portion of the patient's history were reviewed and updated as appropriate: past medical history, past surgical history, past social history, past family history, allergies, current medications, and problem list.    Review of Systems   Constitution: Negative for diaphoresis, fever and malaise/fatigue.   HENT: Negative for congestion, hearing loss, hoarse voice, nosebleeds and sore throat.    Eyes: Negative for photophobia, vision loss in left eye, vision loss in right eye and visual disturbance.   Cardiovascular: Negative for chest pain, dyspnea on exertion, irregular heartbeat, leg swelling, near-syncope, orthopnea, palpitations, paroxysmal nocturnal dyspnea and syncope.   Respiratory: Positive for shortness of breath. Negative for cough, hemoptysis, sleep disturbances due to breathing, snoring, sputum production and wheezing.    Endocrine: Negative for cold intolerance, heat intolerance,  polydipsia, polyphagia and polyuria.   Hematologic/Lymphatic: Negative for bleeding problem. Does not bruise/bleed easily.   Skin: Negative for color change, dry skin, poor wound healing, rash and suspicious lesions.   Musculoskeletal: Negative for arthritis, back pain, falls, gout, joint pain, joint swelling, muscle cramps, muscle weakness and myalgias.   Gastrointestinal: Negative for bloating, abdominal pain, constipation, diarrhea, dysphagia, melena, nausea and vomiting.   Neurological: Positive for excessive daytime sleepiness, loss of balance (feels off balance but has not fallen), sensory change and vertigo. Negative for headaches, light-headedness, numbness, paresthesias, seizures and weakness. Dizziness: feels like he is spinning.   Psychiatric/Behavioral: Positive for memory loss (has trouble remembering some things.). Negative for depression and substance abuse. The patient is not nervous/anxious.        No Known Allergies      Current Outpatient Medications:   •  ACCU-CHEK FASTCLIX LANCETS misc, TEST TID, Disp: , Rfl: 5  •  ACCU-CHEK SMARTVIEW test strip, USE TO TEST BLOOD SUGAR BID, Disp: , Rfl: 6  •  aspirin (ASPIRIN ADULT LOW STRENGTH) 81 MG EC tablet, Take 1 tablet by mouth Daily., Disp: , Rfl:   •  atorvastatin (LIPITOR) 20 MG tablet, TAKE 1 TABLET BY MOUTH DAILY, Disp: 90 tablet, Rfl: 0  •  benzonatate (TESSALON) 200 MG capsule, TK 1 C PO TID PRN, Disp: , Rfl:   •  betamethasone dipropionate (DIPROLENE) 0.05 % ointment, APPLY SPARINGLY TO RASH ON LEG TWICE DAILY AS NEEDED FOR ITCHING, Disp: , Rfl:   •  buPROPion XL (Wellbutrin XL) 300 MG 24 hr tablet, Take 450 mg by mouth Daily. Has a 150 mg tablet and a 300 mg tablet in the morning, Disp: , Rfl:   •  Bystolic 10 MG tablet, TAKE 1 TABLET BY MOUTH DAILY, Disp: 90 tablet, Rfl: 3  •  glimepiride (AMARYL) 4 MG tablet, Take 4 mg by mouth Every Morning Before Breakfast., Disp: , Rfl: 5  •  Hydrocortisone, Perianal, (ANUSOL-HC) 2.5 % rectal cream, Insert   "into the rectum., Disp: , Rfl:   •  LANTUS SOLOSTAR 100 UNIT/ML injection pen, Inject 21 Units under the skin into the appropriate area as directed Daily., Disp: , Rfl:   •  Linagliptin-Metformin HCl (JENTADUETO) 2.5-1000 MG tablet, Take 1 tablet by mouth 2 (Two) Times a Day., Disp: 60 tablet, Rfl:   •  lisinopril (PRINIVIL,ZESTRIL) 20 MG tablet, TAKE 1 TABLET BY MOUTH EVERY DAY, Disp: 90 tablet, Rfl: 0  •  meclizine (ANTIVERT) 12.5 MG tablet, Take 12.5 mg by mouth., Disp: , Rfl:   •  Mirabegron ER (Myrbetriq) 50 MG tablet sustained-release 24 hour 24 hr tablet, Take 50 mg by mouth Daily., Disp: , Rfl:   •  tamsulosin (FLOMAX) 0.4 MG capsule 24 hr capsule, Take 0.8 mg by mouth Daily., Disp: , Rfl:         Objective:     Vitals:    02/26/21 1431   BP: 130/90   Pulse: 77   Resp: 16   SpO2: 99%   Weight: 96.2 kg (212 lb)   Height: 182.9 cm (72\")     Body mass index is 28.75 kg/m².      Vitals signs reviewed.   Constitutional:       General: Not in acute distress.     Appearance: Normal and healthy appearance. Well-developed, well-groomed, normal weight and not in distress.   Eyes:      General:         Right eye: No discharge.         Left eye: No discharge.      Conjunctiva/sclera: Conjunctivae normal.   HENT:      Head: Normocephalic and atraumatic.      Right Ear: External ear normal.      Left Ear: External ear normal.      Nose: Nose normal.   Neck:      Musculoskeletal: Normal range of motion and neck supple.      Thyroid: No thyromegaly.      Vascular: No JVD.      Trachea: No tracheal deviation.      Lymphadenopathy: No cervical adenopathy.   Pulmonary:      Effort: Pulmonary effort is normal. No respiratory distress.      Breath sounds: Normal breath sounds. No wheezing. No rales.   Chest:      Chest wall: Not tender to palpatation.   Cardiovascular:      Normal rate. Regular rhythm.      No gallop.   Pulses:     Intact distal pulses.   Abdominal:      General: There is no distension.      Palpations: Abdomen " is soft.      Tenderness: There is no abdominal tenderness.   Musculoskeletal: Normal range of motion.         General: No tenderness or deformity.   Skin:     General: Skin is warm and dry.      Findings: No erythema or rash.   Neurological:      Mental Status: Alert and oriented to person, place, and time.      Coordination: Coordination normal.   Psychiatric:         Attention and Perception: Attention normal.         Mood and Affect: Mood normal.         Speech: Speech normal.         Behavior: Behavior normal. Behavior is cooperative.         Thought Content: Thought content normal.         Cognition and Memory: Cognition normal.         Judgment: Judgment normal.               ECG 12 Lead    Date/Time: 2/26/2021 2:56 PM  Performed by: Svetlana Villasenor APRN  Authorized by: Svetlana Villasenor APRN   Comparison: compared with previous ECG from 7/31/2020  Similar to previous ECG  Rhythm: sinus rhythm  Rate: normal  Conduction: left anterior fascicular block  ST Segments: ST segments normal  T Waves: T waves normal  QRS axis: left    Clinical impression: abnormal EKG              Assessment:       Diagnosis Plan   1. Essential hypertension     2. Arteriosclerotic vascular disease     3. LAFB (left anterior fascicular block)     4. Hyperlipidemia, unspecified hyperlipidemia type     5. Dyspnea on exertion  Adult Transthoracic Echo Complete W/ Cont if Necessary Per Protocol   6. Dizziness  Adult Transthoracic Echo Complete W/ Cont if Necessary Per Protocol          Plan:      1. Normal stress perfusion study 8/2012 and 1/2015.  Abnormal stress echocardiogram done 2/2018, very mild CAD on cath 5/2/18.  Denies angina  2. Abnormal ECG. Left anterior fascicular block which is chronic.- unchanged  3. Hypertension, goal <120/80. Mildly elevated today, states usually 110/70 at home.  Will monitor  4. Diabetes mellitus type 2, on insulin. Stable.  5. Obstructive sleep apnea on CPAP. Getting retested for this.  6. Hyperlipidemia -  continue lipid lowering therapy, currently on Lipitor  7. Hepatitis C, stable.  8. History of rheumatoid fever, stable. No valvular heart problems.   9. History of gout.  10. Plaquing of the left carotid artery.  11.  Elevated TSH.   12/ Dyspnea/dizziness - will check echo to rule out any cardiac issues    Check echo    As always, it has been a pleasure to participate in your patient's care. Thank you.       Sincerely,       TOOTIE Rivera      Current Outpatient Medications:   •  ACCU-CHEK FASTCLIX LANCETS misc, TEST TID, Disp: , Rfl: 5  •  ACCU-CHEK SMARTVIEW test strip, USE TO TEST BLOOD SUGAR BID, Disp: , Rfl: 6  •  aspirin (ASPIRIN ADULT LOW STRENGTH) 81 MG EC tablet, Take 1 tablet by mouth Daily., Disp: , Rfl:   •  atorvastatin (LIPITOR) 20 MG tablet, TAKE 1 TABLET BY MOUTH DAILY, Disp: 90 tablet, Rfl: 0  •  benzonatate (TESSALON) 200 MG capsule, TK 1 C PO TID PRN, Disp: , Rfl:   •  betamethasone dipropionate (DIPROLENE) 0.05 % ointment, APPLY SPARINGLY TO RASH ON LEG TWICE DAILY AS NEEDED FOR ITCHING, Disp: , Rfl:   •  buPROPion XL (Wellbutrin XL) 300 MG 24 hr tablet, Take 450 mg by mouth Daily. Has a 150 mg tablet and a 300 mg tablet in the morning, Disp: , Rfl:   •  Bystolic 10 MG tablet, TAKE 1 TABLET BY MOUTH DAILY, Disp: 90 tablet, Rfl: 3  •  glimepiride (AMARYL) 4 MG tablet, Take 4 mg by mouth Every Morning Before Breakfast., Disp: , Rfl: 5  •  Hydrocortisone, Perianal, (ANUSOL-HC) 2.5 % rectal cream, Insert  into the rectum., Disp: , Rfl:   •  LANTUS SOLOSTAR 100 UNIT/ML injection pen, Inject 21 Units under the skin into the appropriate area as directed Daily., Disp: , Rfl:   •  Linagliptin-Metformin HCl (JENTADUETO) 2.5-1000 MG tablet, Take 1 tablet by mouth 2 (Two) Times a Day., Disp: 60 tablet, Rfl:   •  lisinopril (PRINIVIL,ZESTRIL) 20 MG tablet, TAKE 1 TABLET BY MOUTH EVERY DAY, Disp: 90 tablet, Rfl: 0  •  meclizine (ANTIVERT) 12.5 MG tablet, Take 12.5 mg by mouth., Disp: , Rfl:   •  Mirabegron  ER (Myrbetriq) 50 MG tablet sustained-release 24 hour 24 hr tablet, Take 50 mg by mouth Daily., Disp: , Rfl:   •  tamsulosin (FLOMAX) 0.4 MG capsule 24 hr capsule, Take 0.8 mg by mouth Daily., Disp: , Rfl:     Dictated utilizing Dragon dictation

## 2021-03-08 ENCOUNTER — HOSPITAL ENCOUNTER (OUTPATIENT)
Dept: CARDIOLOGY | Facility: HOSPITAL | Age: 71
Discharge: HOME | End: 2021-03-08
Admitting: NURSE PRACTITIONER

## 2021-03-08 VITALS — BODY MASS INDEX: 28.71 KG/M2 | WEIGHT: 212 LBS | HEIGHT: 72 IN

## 2021-03-08 DIAGNOSIS — R42 DIZZINESS: ICD-10-CM

## 2021-03-08 DIAGNOSIS — R06.09 DYSPNEA ON EXERTION: ICD-10-CM

## 2021-03-08 PROCEDURE — 25010000002 PERFLUTREN (DEFINITY) 8.476 MG IN SODIUM CHLORIDE (PF) 0.9 % 10 ML INJECTION: Performed by: NURSE PRACTITIONER

## 2021-03-08 PROCEDURE — 93306 TTE W/DOPPLER COMPLETE: CPT

## 2021-03-08 PROCEDURE — 93306 TTE W/DOPPLER COMPLETE: CPT | Performed by: INTERNAL MEDICINE

## 2021-03-08 RX ADMIN — SODIUM CHLORIDE 2 ML: 9 INJECTION INTRAMUSCULAR; INTRAVENOUS; SUBCUTANEOUS at 08:45

## 2021-03-09 LAB
BH CV ECHO MEAS - ACS: 2 CM
BH CV ECHO MEAS - AO MAX PG (FULL): 2 MMHG
BH CV ECHO MEAS - AO MAX PG: 4.8 MMHG
BH CV ECHO MEAS - AO MEAN PG (FULL): 1 MMHG
BH CV ECHO MEAS - AO MEAN PG: 2 MMHG
BH CV ECHO MEAS - AO ROOT AREA (BSA CORRECTED): 1.2
BH CV ECHO MEAS - AO ROOT AREA: 5.7 CM^2
BH CV ECHO MEAS - AO ROOT DIAM: 2.7 CM
BH CV ECHO MEAS - AO V2 MAX: 109 CM/SEC
BH CV ECHO MEAS - AO V2 MEAN: 70.1 CM/SEC
BH CV ECHO MEAS - AO V2 VTI: 22 CM
BH CV ECHO MEAS - ASC AORTA: 2.9 CM
BH CV ECHO MEAS - AVA(I,A): 2.6 CM^2
BH CV ECHO MEAS - AVA(I,D): 2.6 CM^2
BH CV ECHO MEAS - AVA(V,A): 2.6 CM^2
BH CV ECHO MEAS - AVA(V,D): 2.6 CM^2
BH CV ECHO MEAS - BSA(HAYCOCK): 2.2 M^2
BH CV ECHO MEAS - BSA: 2.2 M^2
BH CV ECHO MEAS - BZI_BMI: 28.8 KILOGRAMS/M^2
BH CV ECHO MEAS - BZI_METRIC_HEIGHT: 182.9 CM
BH CV ECHO MEAS - BZI_METRIC_WEIGHT: 96.2 KG
BH CV ECHO MEAS - EDV(CUBED): 85.2 ML
BH CV ECHO MEAS - EDV(MOD-SP2): 89 ML
BH CV ECHO MEAS - EDV(MOD-SP4): 86 ML
BH CV ECHO MEAS - EDV(TEICH): 87.7 ML
BH CV ECHO MEAS - EF(CUBED): 65 %
BH CV ECHO MEAS - EF(MOD-BP): 54.7 %
BH CV ECHO MEAS - EF(MOD-SP2): 55.1 %
BH CV ECHO MEAS - EF(MOD-SP4): 54.7 %
BH CV ECHO MEAS - EF(TEICH): 56.8 %
BH CV ECHO MEAS - EF_3D-VOL: 0 %
BH CV ECHO MEAS - ESV(CUBED): 29.8 ML
BH CV ECHO MEAS - ESV(MOD-SP2): 40 ML
BH CV ECHO MEAS - ESV(MOD-SP4): 39 ML
BH CV ECHO MEAS - ESV(TEICH): 37.9 ML
BH CV ECHO MEAS - FS: 29.5 %
BH CV ECHO MEAS - IVS/LVPW: 1
BH CV ECHO MEAS - IVSD: 0.8 CM
BH CV ECHO MEAS - LAT PEAK E' VEL: 6.4 CM/SEC
BH CV ECHO MEAS - LV DIASTOLIC VOL/BSA (35-75): 39.4 ML/M^2
BH CV ECHO MEAS - LV MASS(C)D: 109.4 GRAMS
BH CV ECHO MEAS - LV MASS(C)DI: 50.1 GRAMS/M^2
BH CV ECHO MEAS - LV MAX PG: 2.7 MMHG
BH CV ECHO MEAS - LV MEAN PG: 1 MMHG
BH CV ECHO MEAS - LV SYSTOLIC VOL/BSA (12-30): 17.9 ML/M^2
BH CV ECHO MEAS - LV V1 MAX: 82.9 CM/SEC
BH CV ECHO MEAS - LV V1 MEAN: 55 CM/SEC
BH CV ECHO MEAS - LV V1 VTI: 16.7 CM
BH CV ECHO MEAS - LVIDD: 4.4 CM
BH CV ECHO MEAS - LVIDS: 3.1 CM
BH CV ECHO MEAS - LVLD AP2: 7.2 CM
BH CV ECHO MEAS - LVLD AP4: 7.6 CM
BH CV ECHO MEAS - LVLS AP2: 6.1 CM
BH CV ECHO MEAS - LVLS AP4: 6.4 CM
BH CV ECHO MEAS - LVOT AREA (M): 3.5 CM^2
BH CV ECHO MEAS - LVOT AREA: 3.5 CM^2
BH CV ECHO MEAS - LVOT DIAM: 2.1 CM
BH CV ECHO MEAS - LVPWD: 0.8 CM
BH CV ECHO MEAS - MED PEAK E' VEL: 4.5 CM/SEC
BH CV ECHO MEAS - MV A DUR: 0.12 SEC
BH CV ECHO MEAS - MV A MAX VEL: 92.5 CM/SEC
BH CV ECHO MEAS - MV DEC SLOPE: 352 CM/SEC^2
BH CV ECHO MEAS - MV DEC TIME: 137 SEC
BH CV ECHO MEAS - MV E MAX VEL: 50.6 CM/SEC
BH CV ECHO MEAS - MV E/A: 0.55
BH CV ECHO MEAS - MV MAX PG: 4 MMHG
BH CV ECHO MEAS - MV MEAN PG: 1 MMHG
BH CV ECHO MEAS - MV P1/2T MAX VEL: 65.2 CM/SEC
BH CV ECHO MEAS - MV P1/2T: 54.3 MSEC
BH CV ECHO MEAS - MV V2 MAX: 99.8 CM/SEC
BH CV ECHO MEAS - MV V2 MEAN: 54.8 CM/SEC
BH CV ECHO MEAS - MV V2 VTI: 21.1 CM
BH CV ECHO MEAS - MVA P1/2T LCG: 3.4 CM^2
BH CV ECHO MEAS - MVA(P1/2T): 4.1 CM^2
BH CV ECHO MEAS - MVA(VTI): 2.7 CM^2
BH CV ECHO MEAS - PA ACC TIME: 0.12 SEC
BH CV ECHO MEAS - PA MAX PG (FULL): 1.7 MMHG
BH CV ECHO MEAS - PA MAX PG: 4.1 MMHG
BH CV ECHO MEAS - PA PR(ACCEL): 24.6 MMHG
BH CV ECHO MEAS - PA V2 MAX: 101 CM/SEC
BH CV ECHO MEAS - PVA(V,A): 4.7 CM^2
BH CV ECHO MEAS - PVA(V,D): 4.7 CM^2
BH CV ECHO MEAS - QP/QS: 1.8
BH CV ECHO MEAS - RAP SYSTOLE: 3 MMHG
BH CV ECHO MEAS - RV MAX PG: 2.4 MMHG
BH CV ECHO MEAS - RV MEAN PG: 1 MMHG
BH CV ECHO MEAS - RV V1 MAX: 77.7 CM/SEC
BH CV ECHO MEAS - RV V1 MEAN: 47 CM/SEC
BH CV ECHO MEAS - RV V1 VTI: 16.5 CM
BH CV ECHO MEAS - RVOT AREA: 6.2 CM^2
BH CV ECHO MEAS - RVOT DIAM: 2.8 CM
BH CV ECHO MEAS - SI(AO): 57.7 ML/M^2
BH CV ECHO MEAS - SI(CUBED): 25.4 ML/M^2
BH CV ECHO MEAS - SI(LVOT): 26.5 ML/M^2
BH CV ECHO MEAS - SI(MOD-SP2): 22.4 ML/M^2
BH CV ECHO MEAS - SI(MOD-SP4): 21.5 ML/M^2
BH CV ECHO MEAS - SI(TEICH): 22.8 ML/M^2
BH CV ECHO MEAS - SV(AO): 126 ML
BH CV ECHO MEAS - SV(CUBED): 55.4 ML
BH CV ECHO MEAS - SV(LVOT): 57.8 ML
BH CV ECHO MEAS - SV(MOD-SP2): 49 ML
BH CV ECHO MEAS - SV(MOD-SP4): 47 ML
BH CV ECHO MEAS - SV(RVOT): 101.6 ML
BH CV ECHO MEAS - SV(TEICH): 49.8 ML
BH CV ECHO MEAS - TAPSE (>1.6): 2 CM
BH CV ECHO MEASUREMENTS AVERAGE E/E' RATIO: 9.28
BH CV VAS BP LEFT ARM: NORMAL MMHG
BH CV XLRA - RV BASE: 2.5 CM
BH CV XLRA - RV LENGTH: 6.4 CM
BH CV XLRA - RV MID: 2 CM
BH CV XLRA - TDI S': 10.9 CM/SEC
LEFT ATRIUM VOLUME INDEX: 18 ML/M2
LV EF 2D ECHO EST: 55 %

## 2021-03-09 NOTE — PROGRESS NOTES
"Spoke to patient with results.  He has seen his ENT and is in therapy for some \"balancing exercises\"."

## 2021-03-19 ENCOUNTER — OFFICE VISIT (OUTPATIENT)
Dept: CARDIOLOGY | Facility: CLINIC | Age: 71
End: 2021-03-19

## 2021-03-19 VITALS
BODY MASS INDEX: 28.74 KG/M2 | DIASTOLIC BLOOD PRESSURE: 70 MMHG | HEART RATE: 91 BPM | HEIGHT: 72 IN | SYSTOLIC BLOOD PRESSURE: 130 MMHG | WEIGHT: 212.2 LBS

## 2021-03-19 DIAGNOSIS — Z79.4 TYPE 2 DIABETES MELLITUS WITHOUT COMPLICATION, WITH LONG-TERM CURRENT USE OF INSULIN (HCC): ICD-10-CM

## 2021-03-19 DIAGNOSIS — I10 ESSENTIAL HYPERTENSION: Primary | ICD-10-CM

## 2021-03-19 DIAGNOSIS — E78.2 MIXED HYPERLIPIDEMIA: ICD-10-CM

## 2021-03-19 DIAGNOSIS — E11.9 TYPE 2 DIABETES MELLITUS WITHOUT COMPLICATION, WITH LONG-TERM CURRENT USE OF INSULIN (HCC): ICD-10-CM

## 2021-03-19 PROCEDURE — 93000 ELECTROCARDIOGRAM COMPLETE: CPT | Performed by: INTERNAL MEDICINE

## 2021-03-19 PROCEDURE — 99214 OFFICE O/P EST MOD 30 MIN: CPT | Performed by: INTERNAL MEDICINE

## 2021-03-19 NOTE — PROGRESS NOTES
Date of Office Visit: 2021  Encounter Provider: Isabelle Flores MD  Place of Service: [facility] CARDIOLOGY  Patient Name: Mike Cox  :1950      Patient ID:  Mike Cox is a 70 y.o. male is here for  followup for cardiac risks.         History of Present Illness    He has hypertension, hyperlipidemia, diabetes mellitus type 2, and obstructive sleep  apnea. He first presented to the office for chest pain. Because of his cardiovascular  risks, he had an exercise nuclear stress study done in  which was negative for  ischemia. He had a lot of fatigue at that time and palpitations which were treated and  got better. His Holter recording done in 2008 showed premature ventricular complexes.   He had vascular screening done on 2009 which was normal. He had a cardiac duplex   study performed on 2011 which showed moderate intimal thickening of the right   common carotid artery with mild thickening of that carotid bulb but no thickening of his stenosis.   He presented in 2008 with fatigue and had a stress nuclear perfusion study showing no evidence   of ischemia. His echocardiogram then showed an ejection fraction of 65% with grade I diastolic dysfunction  and no significant valvular heart problems.      He had a stress nuclear perfusion study done on 2015,  showing no evidence of ischemia and ejection fraction of 57%. He had a 2-D echocardiogram  with Doppler done on 2014, showing an ejection fraction of 54% and grade 1 diastolic  dysfunction. He had carotid duplex studies done on 2015, which showed moderate  intimal thickening of the right internal carotid artery and moderate plaque in the left  internal carotid artery.      The patient had vascular screening done 2017, which was normal. He had normal vascular screening done 2018.         He had a stress echocardiogram done 18 showing baseline ejection  fraction 59% with postexercise ejection fraction of 68% with septal hypokinesis which may be secondary to bundle branch block.  There was noted to be grade 1 diastolic dysfunction and no valvular disease. This was done for dyspnea on exertion.  cardiac catheterization done 5/2/18 which showed normal left main, large dominant normal circumflex, small nondominant RCA, 10-20% proximal LAD stenosis with normal mid and distal LAD.  30-40% first diagonal stenosis.  Normal left ventricular systolic function.     Labs done 12/15/20 show normal BMP, normal sinus.  Lipids in 9/4/2020 show HDL 42, LDL 41, triglycerides 163.  He had a TSH of 3.39, normal 12/8/2020 and elevated hemoglobin A1c of 7.42.  Vascular screening done 8/17/2020 showed bilateral carotid plaquing, no stenosis, otherwise normal vascular screening.  Echo done 3/8/2021 showed normal ejection fraction 55% normal saline contrast study, normal diastolic function no significant valvular disease.     He has had some off-balance feeling, occasionally hitting a wall or door frame.  He saw ENT and they did not think this is vertigo.  He had came in to see Maximino Villasenor.  Echocardiogram was performed which was normal.  His work-up was unremarkable.  He went back to see another ENT and they did say that there was some vestibular dysfunction and he is getting physical therapy for this now.  He had no exertional chest tightness or pressure.  He has no orthopnea or PND.  He does not feel his heart racing or skipping.  He has no exertional dyspnea.  He is taking his medications as directed out difficulty.  He has wife have moved into a [facility] and he is enjoying that.  There is a gymnasium with a condo.  He is biking on a stationary bike 20 minutes daily.  He has no chest pain associate with this.    Past Medical History:   Diagnosis Date   • Arteriosclerotic vascular disease    • BPH (benign prostatic hyperplasia)    • Carotid artery disease (CMS/HCC)     RIGHT   • Colon  polyp    • Diabetes mellitus (CMS/HCC) 2002    type II, pt reports A1C of 7.1 in 3/2016   • Dyspnea on exertion     with fatigue   • Gout    • Heart murmur    • Hemorrhoids    • High cholesterol    • History of hepatitis C     In remission after Interferon and Ribavirin treatment   • HSV-2 infection    • Hyperlipidemia    • Hypertension    • PAULETTE (obstructive sleep apnea)     USES BIPAP, 2018 resolved, no longer uses cpap   • Peroneal tendonitis     LEFT   • Plantar fasciitis    • Substance abuse (CMS/[facility])     H/O DRUG ABUSE, NOW SOBER( IV DRUG USE)   • Substance abuse (CMS/HCC) 1987    H/O ALCOHOL ABUSE   • Vitamin B 12 deficiency    • Vitamin D deficiency          Past Surgical History:   Procedure Laterality Date   • ANKLE LIGAMENT RECONSTRUCTION Left 6/10/2019    Procedure: PERONEAL TENDON RECONSTRUCTION AND LEFT 5TH METATARSAL OSTECTOMY;  Surgeon: Ravi Ruby MD;  Location:  [facility];  Service: Orthopedics   • CARDIAC CATHETERIZATION N/A 5/2/2018    Procedure: Coronary angiography;  Surgeon: Maik Preciado MD;  Location: [facility] CATH INVASIVE LOCATION;  Service: Cardiovascular   • CARDIAC CATHETERIZATION N/A 5/2/2018    Procedure: Left ventriculography;  Surgeon: Maik Preciado MD;  Location: [facility] CATH INVASIVE LOCATION;  Service: Cardiovascular   • COLONOSCOPY N/A 09/10/2013    Normal, repeat in 10 years-Dr. Svetlana Ornelas   • COLONOSCOPY N/A 11/17/2006    Normal, repeat in 5 years-Dr. Jackson Denise   • COLONOSCOPY N/A 08/25/2009    Normal, repeat in 5 years-Dr. Jackson Denise   • COLONOSCOPY N/A 4/2/2018    Procedure: COLONOSCOPY into cecum and ti;  Surgeon: Svetlana Ornelas MD;  Location: [facility] ENDOSCOPY;  Service: Gastroenterology   • CYST REMOVAL N/A 06/22/2015    ON FOREHEAD, EPIDERMOID/SEBACEOUS, DR. SAHIL PFEFIFER   • CYSTOSCOPY     • KNEE SURGERY Right    • SHOULDER SURGERY Bilateral 1995    BONE SPURS, DR. TYRA MURILLO   • TENNIS ELBOW RELEASE Right 7/18/2017    Procedure: RIGHT ELBOW OPEN  FLEXOR TENDON DEBRIDEMENT AND REPAIR;  Surgeon: FABIO Hess MD;  [place] [facility] [place] [facility]  Service:        Current Outpatient Medications on File Prior to Visit   Medication Sig Dispense Refill   • ACCU-CHEK FASTCLIX LANCETS misc TEST TID  5   • ACCU-CHEK SMARTVIEW test strip USE TO TEST BLOOD SUGAR BID  6   • aspirin (ASPIRIN ADULT LOW STRENGTH) 81 MG EC tablet Take 1 tablet by mouth Daily.     • atorvastatin (LIPITOR) 20 MG tablet TAKE 1 TABLET BY MOUTH DAILY 90 tablet 0   • benzonatate (TESSALON) 200 MG capsule TK 1 C PO TID PRN     • betamethasone dipropionate (DIPROLENE) 0.05 % ointment APPLY SPARINGLY TO RASH ON LEG TWICE DAILY AS NEEDED FOR ITCHING     • buPROPion XL (Wellbutrin XL) 300 MG 24 hr tablet Take 450 mg by mouth Daily. Has a 150 mg tablet and a 300 mg tablet in the morning     • Bystolic 10 MG tablet TAKE 1 TABLET BY MOUTH DAILY 90 tablet 3   • glimepiride (AMARYL) 4 MG tablet Take 4 mg by mouth Every Morning Before Breakfast.  5   • Hydrocortisone, Perianal, (ANUSOL-HC) 2.5 % rectal cream Insert  into the rectum.     • LANTUS SOLOSTAR 100 UNIT/ML injection pen Inject 21 Units under the skin into the appropriate area as directed Daily.     • Linagliptin-Metformin HCl (JENTADUETO) 2.5-1000 MG tablet Take 1 tablet by mouth 2 (Two) Times a Day. 60 tablet    • lisinopril (PRINIVIL,ZESTRIL) 20 MG tablet TAKE 1 TABLET BY MOUTH EVERY DAY 90 tablet 0   • meclizine (ANTIVERT) 12.5 MG tablet Take 12.5 mg by mouth.     • Mirabegron ER (Myrbetriq) 50 MG tablet sustained-release 24 hour 24 hr tablet Take 50 mg by mouth Daily.     • tamsulosin (FLOMAX) 0.4 MG capsule 24 hr capsule Take 0.8 mg by mouth Daily.       No current facility-administered medications on file prior to visit.       Social History     Socioeconomic History   • Marital status:      Spouse name: Lucy Cox   • Number of children: Not on file   • Years of education: Not on file   • Highest education level: Not on file   Tobacco  "Use   • Smoking status: Former Smoker     Packs/day: 1.00     Years: 20.00     Pack years: 20.00     Types: Cigarettes     Start date:      Quit date:      Years since quittin.2   • Smokeless tobacco: Never Used   • Tobacco comment: Caffeine use 2 cups daily   Substance and Sexual Activity   • Alcohol use: No     Comment: Quit    • Drug use: No     Comment: sober since 35 h/o iv drug use   • Sexual activity: Defer           ROS    Procedures    ECG 12 Lead    Date/Time: 3/19/2021 1:16 PM  Performed by: Isabelle Flores MD  Authorized by: Isabelle Flores MD   Comparison: compared with previous ECG   Similar to previous ECG  Rhythm: sinus rhythm  Conduction: incomplete right bundle branch block and left anterior fascicular block    Clinical impression: abnormal EKG                Objective:      Vitals:    21 1302   BP: 130/70   BP Location: Left arm   Pulse: 91   Weight: 96.3 kg (212 lb 3.2 oz)   Height: 182.9 cm (72\")     Body mass index is 28.78 kg/m².    Vitals reviewed.   Constitutional:       General: Not in acute distress.     Appearance: Well-developed. Not diaphoretic.   Eyes:      General: No scleral icterus.     Conjunctiva/sclera: Conjunctivae normal.   HENT:      Head: Normocephalic and atraumatic.   Neck:      Thyroid: No thyromegaly.      Vascular: No carotid bruit or JVD.      Lymphadenopathy: No cervical adenopathy.   Pulmonary:      Effort: Pulmonary effort is normal. No respiratory distress.      Breath sounds: Normal breath sounds. No wheezing. No rhonchi. No rales.   Chest:      Chest wall: Not tender to palpatation.   Cardiovascular:      Normal rate. Regular rhythm.      Murmurs: There is no murmur.      No gallop.   Pulses:     Intact distal pulses.   Edema:     Peripheral edema absent.   Abdominal:      General: Bowel sounds are normal. There is no distension or abdominal bruit.      Palpations: Abdomen is soft. There is no abdominal mass.      Tenderness: " There is no abdominal tenderness.   Musculoskeletal:         General: No deformity.      Extremities: No clubbing present.     Cervical back: Neck supple. Skin:     General: Skin is warm and dry. There is no cyanosis.      Coloration: Skin is not pale.      Findings: No rash.   Neurological:      Mental Status: Alert and oriented to person, place, and time.      Cranial Nerves: No cranial nerve deficit.   Psychiatric:         Judgment: Judgment normal.         Lab Review:       Assessment:      Diagnosis Plan   1. Essential hypertension     2. Mixed hyperlipidemia     3. Type 2 diabetes mellitus without complication, with long-term current use of insulin (CMS/[facility])        1. Normal stress perfusion study 8/2012 and 1/2015.  Abnormal stress echocardiogram done 2/2018, very mild CAD on cath 5/2/18, no angina.   2. Abnormal ECG. Left anterior fascicular block which is chronic.  3. Hypertension, goal <120/80.   4. Diabetes mellitus type 2, on insulin. Stable.  5. Obstructive sleep apnea on CPAP. Getting retested for this.  6. Hyperlipidemia. On atorvastatin.   7. Hepatitis C, stable.  8. History of rheumatoid fever, stable. No valvular heart problems.   9. History of gout.  10. Plaquing of the left carotid artery.  11.  Elevated TSH.      Plan:       See back in 6 months, no further testing at this time, no medication changes, overall doing well.  Advised 30 minutes on the bicycle daily.

## 2021-03-24 ENCOUNTER — TRANSCRIBE ORDERS (OUTPATIENT)
Dept: ADMINISTRATIVE | Facility: HOSPITAL | Age: 71
End: 2021-03-24

## 2021-03-24 ENCOUNTER — LAB (OUTPATIENT)
Dept: LAB | Facility: HOSPITAL | Age: 71
End: 2021-03-24

## 2021-03-24 DIAGNOSIS — E78.1 HYPERTRIGLYCERIDEMIA: ICD-10-CM

## 2021-03-24 DIAGNOSIS — I10 HYPERTENSION, UNSPECIFIED TYPE: ICD-10-CM

## 2021-03-24 DIAGNOSIS — E11.65 TYPE II DIABETES MELLITUS WITH HYPEROSMOLARITY, UNCONTROLLED (HCC): Primary | ICD-10-CM

## 2021-03-24 DIAGNOSIS — E11.00 TYPE II DIABETES MELLITUS WITH HYPEROSMOLARITY, UNCONTROLLED (HCC): Primary | ICD-10-CM

## 2021-03-24 DIAGNOSIS — E11.65 TYPE II DIABETES MELLITUS WITH HYPEROSMOLARITY, UNCONTROLLED (HCC): ICD-10-CM

## 2021-03-24 DIAGNOSIS — E11.00 TYPE II DIABETES MELLITUS WITH HYPEROSMOLARITY, UNCONTROLLED (HCC): ICD-10-CM

## 2021-03-24 LAB
ALBUMIN SERPL-MCNC: 4.4 G/DL (ref 3.5–5.2)
ALBUMIN UR-MCNC: <1.2 MG/DL
ALBUMIN/GLOB SERPL: 1.6 G/DL
ALP SERPL-CCNC: 125 U/L (ref 39–117)
ALT SERPL W P-5'-P-CCNC: 21 U/L (ref 1–41)
ANION GAP SERPL CALCULATED.3IONS-SCNC: 13 MMOL/L (ref 5–15)
AST SERPL-CCNC: 19 U/L (ref 1–40)
BILIRUB SERPL-MCNC: 0.4 MG/DL (ref 0–1.2)
BUN SERPL-MCNC: 9 MG/DL (ref 8–23)
BUN/CREAT SERPL: 12 (ref 7–25)
CALCIUM SPEC-SCNC: 9.2 MG/DL (ref 8.6–10.5)
CHLORIDE SERPL-SCNC: 100 MMOL/L (ref 98–107)
CO2 SERPL-SCNC: 24 MMOL/L (ref 22–29)
CREAT SERPL-MCNC: 0.75 MG/DL (ref 0.76–1.27)
CREAT UR-MCNC: 86.4 MG/DL
GFR SERPL CREATININE-BSD FRML MDRD: 103 ML/MIN/1.73
GLOBULIN UR ELPH-MCNC: 2.7 GM/DL
GLUCOSE SERPL-MCNC: 183 MG/DL (ref 65–99)
HBA1C MFR BLD: 7.19 % (ref 4.8–5.6)
MICROALBUMIN/CREAT UR: NORMAL MG/G{CREAT}
POTASSIUM SERPL-SCNC: 4.2 MMOL/L (ref 3.5–5.2)
PROT SERPL-MCNC: 7.1 G/DL (ref 6–8.5)
SODIUM SERPL-SCNC: 137 MMOL/L (ref 136–145)
TSH SERPL DL<=0.05 MIU/L-ACNC: 3.3 UIU/ML (ref 0.27–4.2)

## 2021-03-24 PROCEDURE — 84443 ASSAY THYROID STIM HORMONE: CPT

## 2021-03-24 PROCEDURE — 82570 ASSAY OF URINE CREATININE: CPT

## 2021-03-24 PROCEDURE — 82043 UR ALBUMIN QUANTITATIVE: CPT

## 2021-03-24 PROCEDURE — 36415 COLL VENOUS BLD VENIPUNCTURE: CPT

## 2021-03-24 PROCEDURE — 83036 HEMOGLOBIN GLYCOSYLATED A1C: CPT

## 2021-03-24 PROCEDURE — 80053 COMPREHEN METABOLIC PANEL: CPT

## 2021-06-09 ENCOUNTER — TRANSCRIBE ORDERS (OUTPATIENT)
Dept: ADMINISTRATIVE | Facility: HOSPITAL | Age: 71
End: 2021-06-09

## 2021-06-09 ENCOUNTER — LAB (OUTPATIENT)
Dept: LAB | Facility: HOSPITAL | Age: 71
End: 2021-06-09

## 2021-06-09 DIAGNOSIS — E78.5 HYPERLIPIDEMIA, UNSPECIFIED HYPERLIPIDEMIA TYPE: ICD-10-CM

## 2021-06-09 DIAGNOSIS — I10 ESSENTIAL HYPERTENSION, MALIGNANT: ICD-10-CM

## 2021-06-09 DIAGNOSIS — E11.00 TYPE II DIABETES MELLITUS WITH HYPEROSMOLARITY, UNCONTROLLED (HCC): Primary | ICD-10-CM

## 2021-06-09 DIAGNOSIS — E11.65 TYPE II DIABETES MELLITUS WITH HYPEROSMOLARITY, UNCONTROLLED (HCC): Primary | ICD-10-CM

## 2021-06-09 DIAGNOSIS — E11.65 TYPE II DIABETES MELLITUS WITH HYPEROSMOLARITY, UNCONTROLLED (HCC): ICD-10-CM

## 2021-06-09 DIAGNOSIS — E11.00 TYPE II DIABETES MELLITUS WITH HYPEROSMOLARITY, UNCONTROLLED (HCC): ICD-10-CM

## 2021-06-09 LAB
ALBUMIN SERPL-MCNC: 4.1 G/DL (ref 3.5–5.2)
ALBUMIN/GLOB SERPL: 1.6 G/DL
ALP SERPL-CCNC: 133 U/L (ref 39–117)
ALT SERPL W P-5'-P-CCNC: 20 U/L (ref 1–41)
ANION GAP SERPL CALCULATED.3IONS-SCNC: 10.7 MMOL/L (ref 5–15)
AST SERPL-CCNC: 18 U/L (ref 1–40)
BILIRUB SERPL-MCNC: 0.5 MG/DL (ref 0–1.2)
BUN SERPL-MCNC: 12 MG/DL (ref 8–23)
BUN/CREAT SERPL: 16.2 (ref 7–25)
CALCIUM SPEC-SCNC: 9.1 MG/DL (ref 8.6–10.5)
CHLORIDE SERPL-SCNC: 102 MMOL/L (ref 98–107)
CHOLEST SERPL-MCNC: 130 MG/DL (ref 0–200)
CO2 SERPL-SCNC: 24.3 MMOL/L (ref 22–29)
CREAT SERPL-MCNC: 0.74 MG/DL (ref 0.76–1.27)
GFR SERPL CREATININE-BSD FRML MDRD: 104 ML/MIN/1.73
GLOBULIN UR ELPH-MCNC: 2.6 GM/DL
GLUCOSE SERPL-MCNC: 183 MG/DL (ref 65–99)
HBA1C MFR BLD: 7.57 % (ref 4.8–5.6)
HDLC SERPL-MCNC: 47 MG/DL (ref 40–60)
LDLC SERPL CALC-MCNC: 62 MG/DL (ref 0–100)
LDLC/HDLC SERPL: 1.27 {RATIO}
POTASSIUM SERPL-SCNC: 4.3 MMOL/L (ref 3.5–5.2)
PROT SERPL-MCNC: 6.7 G/DL (ref 6–8.5)
SODIUM SERPL-SCNC: 137 MMOL/L (ref 136–145)
TRIGL SERPL-MCNC: 117 MG/DL (ref 0–150)
VLDLC SERPL-MCNC: 21 MG/DL (ref 5–40)

## 2021-06-09 PROCEDURE — 80053 COMPREHEN METABOLIC PANEL: CPT

## 2021-06-09 PROCEDURE — 36415 COLL VENOUS BLD VENIPUNCTURE: CPT

## 2021-06-09 PROCEDURE — 80061 LIPID PANEL: CPT

## 2021-06-09 PROCEDURE — 83036 HEMOGLOBIN GLYCOSYLATED A1C: CPT

## 2021-07-26 ENCOUNTER — TRANSCRIBE ORDERS (OUTPATIENT)
Dept: ADMINISTRATIVE | Facility: HOSPITAL | Age: 71
End: 2021-07-26

## 2021-07-26 ENCOUNTER — LAB (OUTPATIENT)
Dept: LAB | Facility: HOSPITAL | Age: 71
End: 2021-07-26

## 2021-07-26 DIAGNOSIS — F34.1 DYSTHYMIC DISORDER: Primary | ICD-10-CM

## 2021-07-26 DIAGNOSIS — G31.84 MCI (MILD COGNITIVE IMPAIRMENT) WITH MEMORY LOSS: ICD-10-CM

## 2021-07-26 DIAGNOSIS — F34.1 DYSTHYMIC DISORDER: ICD-10-CM

## 2021-07-26 LAB
TSH SERPL DL<=0.05 MIU/L-ACNC: 2.84 UIU/ML (ref 0.27–4.2)
VIT B12 BLD-MCNC: >2000 PG/ML (ref 211–946)

## 2021-07-26 PROCEDURE — 36415 COLL VENOUS BLD VENIPUNCTURE: CPT

## 2021-07-26 PROCEDURE — 84443 ASSAY THYROID STIM HORMONE: CPT

## 2021-07-26 PROCEDURE — 82607 VITAMIN B-12: CPT

## 2021-08-31 ENCOUNTER — TRANSCRIBE ORDERS (OUTPATIENT)
Dept: CARDIOLOGY | Facility: HOSPITAL | Age: 71
End: 2021-08-31

## 2021-08-31 ENCOUNTER — TRANSCRIBE ORDERS (OUTPATIENT)
Dept: ADMINISTRATIVE | Facility: HOSPITAL | Age: 71
End: 2021-08-31

## 2021-08-31 ENCOUNTER — HOSPITAL ENCOUNTER (OUTPATIENT)
Dept: CARDIOLOGY | Facility: HOSPITAL | Age: 71
Discharge: HOME | End: 2021-08-31

## 2021-08-31 ENCOUNTER — LAB (OUTPATIENT)
Dept: LAB | Facility: HOSPITAL | Age: 71
End: 2021-08-31

## 2021-08-31 ENCOUNTER — IMMUNIZATION (OUTPATIENT)
Dept: VACCINE CLINIC | Facility: HOSPITAL | Age: 71
End: 2021-08-31

## 2021-08-31 DIAGNOSIS — Z01.811 PRE-OP CHEST EXAM: Primary | ICD-10-CM

## 2021-08-31 DIAGNOSIS — Z01.818 PREOP TESTING: ICD-10-CM

## 2021-08-31 DIAGNOSIS — Z01.818 PREOP TESTING: Primary | ICD-10-CM

## 2021-08-31 LAB
ANION GAP SERPL CALCULATED.3IONS-SCNC: 12.6 MMOL/L (ref 5–15)
BUN SERPL-MCNC: 16 MG/DL (ref 8–23)
BUN/CREAT SERPL: 19 (ref 7–25)
CALCIUM SPEC-SCNC: 9.3 MG/DL (ref 8.6–10.5)
CHLORIDE SERPL-SCNC: 101 MMOL/L (ref 98–107)
CO2 SERPL-SCNC: 25.4 MMOL/L (ref 22–29)
CREAT SERPL-MCNC: 0.84 MG/DL (ref 0.76–1.27)
DEPRECATED RDW RBC AUTO: 45.6 FL (ref 37–54)
ERYTHROCYTE [DISTWIDTH] IN BLOOD BY AUTOMATED COUNT: 13.2 % (ref 12.3–15.4)
GFR SERPL CREATININE-BSD FRML MDRD: 90 ML/MIN/1.73
GLUCOSE SERPL-MCNC: 130 MG/DL (ref 65–99)
HCT VFR BLD AUTO: 50.4 % (ref 37.5–51)
HGB BLD-MCNC: 16.3 G/DL (ref 13–17.7)
MCH RBC QN AUTO: 30.2 PG (ref 26.6–33)
MCHC RBC AUTO-ENTMCNC: 32.3 G/DL (ref 31.5–35.7)
MCV RBC AUTO: 93.5 FL (ref 79–97)
PLATELET # BLD AUTO: 239 10*3/MM3 (ref 140–450)
PMV BLD AUTO: 9.7 FL (ref 6–12)
POTASSIUM SERPL-SCNC: 4.5 MMOL/L (ref 3.5–5.2)
QT INTERVAL: 427 MS
RBC # BLD AUTO: 5.39 10*6/MM3 (ref 4.14–5.8)
SODIUM SERPL-SCNC: 139 MMOL/L (ref 136–145)
WBC # BLD AUTO: 11.71 10*3/MM3 (ref 3.4–10.8)

## 2021-08-31 PROCEDURE — 80048 BASIC METABOLIC PNL TOTAL CA: CPT

## 2021-08-31 PROCEDURE — 91300 HC SARSCOV02 VAC 30MCG/0.3ML IM: CPT | Performed by: INTERNAL MEDICINE

## 2021-08-31 PROCEDURE — 85027 COMPLETE CBC AUTOMATED: CPT

## 2021-08-31 PROCEDURE — 93010 ELECTROCARDIOGRAM REPORT: CPT | Performed by: INTERNAL MEDICINE

## 2021-08-31 PROCEDURE — 93005 ELECTROCARDIOGRAM TRACING: CPT

## 2021-08-31 PROCEDURE — 36415 COLL VENOUS BLD VENIPUNCTURE: CPT

## 2021-08-31 PROCEDURE — 0001A: CPT | Performed by: INTERNAL MEDICINE

## 2021-09-10 ENCOUNTER — OFFICE VISIT (OUTPATIENT)
Dept: CARDIOLOGY | Facility: CLINIC | Age: 71
End: 2021-09-10

## 2021-09-10 VITALS
DIASTOLIC BLOOD PRESSURE: 80 MMHG | WEIGHT: 202 LBS | HEART RATE: 77 BPM | BODY MASS INDEX: 27.36 KG/M2 | OXYGEN SATURATION: 98 % | HEIGHT: 72 IN | RESPIRATION RATE: 16 BRPM | SYSTOLIC BLOOD PRESSURE: 118 MMHG

## 2021-09-10 DIAGNOSIS — I44.4 LAFB (LEFT ANTERIOR FASCICULAR BLOCK): ICD-10-CM

## 2021-09-10 DIAGNOSIS — E11.9 TYPE 2 DIABETES MELLITUS WITHOUT COMPLICATION, WITH LONG-TERM CURRENT USE OF INSULIN (HCC): ICD-10-CM

## 2021-09-10 DIAGNOSIS — R06.09 DYSPNEA ON EXERTION: Primary | ICD-10-CM

## 2021-09-10 DIAGNOSIS — I70.90 ARTERIOSCLEROTIC VASCULAR DISEASE: ICD-10-CM

## 2021-09-10 DIAGNOSIS — I10 ESSENTIAL HYPERTENSION: ICD-10-CM

## 2021-09-10 DIAGNOSIS — Z79.4 TYPE 2 DIABETES MELLITUS WITHOUT COMPLICATION, WITH LONG-TERM CURRENT USE OF INSULIN (HCC): ICD-10-CM

## 2021-09-10 DIAGNOSIS — E78.2 MIXED HYPERLIPIDEMIA: ICD-10-CM

## 2021-09-10 PROCEDURE — 99214 OFFICE O/P EST MOD 30 MIN: CPT | Performed by: INTERNAL MEDICINE

## 2021-09-10 PROCEDURE — 93000 ELECTROCARDIOGRAM COMPLETE: CPT | Performed by: INTERNAL MEDICINE

## 2021-09-10 RX ORDER — EMPAGLIFLOZIN, METFORMIN HYDROCHLORIDE 5; 1000 MG/1; MG/1
2 TABLET, EXTENDED RELEASE ORAL DAILY
COMMUNITY
Start: 2021-08-17 | End: 2022-05-23 | Stop reason: DRUGHIGH

## 2021-09-10 NOTE — PROGRESS NOTES
Date of Office Visit: 09/10/2021  Encounter Provider: Isabelle Flores MD  Place of Service: [facility] CARDIOLOGY  Patient Name: Mike Cox  :1950      Patient ID:  Mike Cox is a 70 y.o. male is here for  followup for cardiac risks.        History of Present Illness    He has hypertension, hyperlipidemia, diabetes mellitus type 2, and obstructive sleep  apnea. He first presented to the office for chest pain. Because of his cardiovascular  risks, he had an exercise nuclear stress study done in  which was negative for  ischemia. He had a lot of fatigue at that time and palpitations which were treated and  got better. His Holter recording done in 2008 showed premature ventricular complexes.   He had vascular screening done on 2009 which was normal. He had a cardiac duplex   study performed on 2011 which showed moderate intimal thickening of the right   common carotid artery with mild thickening of that carotid bulb but no thickening of his stenosis.   He presented in 2008 with fatigue and had a stress nuclear perfusion study showing no evidence   of ischemia. His echocardiogram then showed an ejection fraction of 65% with grade I diastolic dysfunction  and no significant valvular heart problems.      He had a stress nuclear perfusion study done on 2015,  showing no evidence of ischemia and ejection fraction of 57%. He had a 2-D echocardiogram  with Doppler done on 2014, showing an ejection fraction of 54% and grade 1 diastolic  dysfunction. He had carotid duplex studies done on 2015, which showed moderate  intimal thickening of the right internal carotid artery and moderate plaque in the left  internal carotid artery.      He had a stress echocardiogram done 18 showing baseline ejection fraction 59% with postexercise ejection fraction of 68% with septal hypokinesis which may be secondary to bundle branch block.   There was noted to be grade 1 diastolic dysfunction and no valvular disease. This was done for dyspnea on exertion.  cardiac catheterization done 5/2/18 which showed normal left main, large dominant normal circumflex, small nondominant RCA, 10-20% proximal LAD stenosis with normal mid and distal LAD.  30-40% first diagonal stenosis.  Normal left ventricular systolic function.     Vascular screening done 8/17/2020 showed bilateral carotid plaquing, no stenosis, otherwise normal vascular screening.  Echo done 3/8/2021 showed normal ejection fraction 55% normal saline contrast study, normal diastolic function no significant valvular disease.    Labs on 6/9/2021 show glucose 183, otherwise normal CMP, hemoglobin A1c 7.57, normal TSH, total cholesterol 130, HDL 47, LDL 62, VLDL 21, triglycerides 117.  He had a normal CBC done 8/31/2021.    He went to [place] with his daughter and they decided to go to an [employer].  After he walked from a parked his car to the [employer], he was very short winded and felt sweaty.  He was very fatigued.  He had no chest pain but he could not go on.  He sat down.  He thought maybe it was his blood sugar because he had had lunch but after sitting there for a while, he felt no better.  The episode ended up lasting about 3 to 4 hours.  He has had no other episodes since then.  He has not been exercising.  He does not feels heart racing or skipping.  He has had no dizziness or syncope.  He has no orthopnea or PND.  He is taking his medications as directed without difficulty.    Past Medical History:   Diagnosis Date   • Arteriosclerotic vascular disease    • BPH (benign prostatic hyperplasia)    • Carotid artery disease (CMS/HCC)     RIGHT   • Colon polyp    • Diabetes mellitus (CMS/HCC) 2002    type II, pt reports A1C of 7.1 in 3/2016   • Dyspnea on exertion     with fatigue   • Gout    • Heart murmur    • Hemorrhoids    • High cholesterol    • History of hepatitis C     In remission after  Interferon and Ribavirin treatment   • HSV-2 infection    • Hyperlipidemia    • Hypertension    • PAULETTE (obstructive sleep apnea)     USES BIPAP, 2018 resolved, no longer uses cpap   • Peroneal tendonitis     LEFT   • Plantar fasciitis    • Substance abuse (CMS/[facility])     H/O DRUG ABUSE, NOW SOBER( IV DRUG USE)   • Substance abuse (CMS/[facility]) 1987    H/O ALCOHOL ABUSE   • Vitamin B 12 deficiency    • Vitamin D deficiency          Past Surgical History:   Procedure Laterality Date   • ANKLE LIGAMENT RECONSTRUCTION Left 6/10/2019    Procedure: PERONEAL TENDON RECONSTRUCTION AND LEFT 5TH METATARSAL OSTECTOMY;  Surgeon: Ravi Ruby MD;  Location: [facility];  Service: Orthopedics   • CARDIAC CATHETERIZATION N/A 5/2/2018    Procedure: Coronary angiography;  Surgeon: Maik Preciado MD;  Location: [facility] CATH INVASIVE LOCATION;  Service: Cardiovascular   • CARDIAC CATHETERIZATION N/A 5/2/2018    Procedure: Left ventriculography;  Surgeon: Maik Preciado MD;  Location: [facility] CATH INVASIVE LOCATION;  Service: Cardiovascular   • COLONOSCOPY N/A 09/10/2013    Normal, repeat in 10 years-Dr. Svetlana Ornelas   • COLONOSCOPY N/A 11/17/2006    Normal, repeat in 5 years-Dr. Jackson Denise   • COLONOSCOPY N/A 08/25/2009    Normal, repeat in 5 years-Dr. Jackson Denise   • COLONOSCOPY N/A 4/2/2018    Procedure: COLONOSCOPY into cecum and ti;  Surgeon: Svetlana Ornelas MD;  Location: [facility] ENDOSCOPY;  Service: Gastroenterology   • CYST REMOVAL N/A 06/22/2015    ON FOREHEAD, EPIDERMOID/SEBACEOUS, DR. SAHIL PFEIFFER   • CYSTOSCOPY     • KNEE SURGERY Right    • SHOULDER SURGERY Bilateral 1995    BONE SPURS, DR. TYRA HESS   • TENNIS ELBOW RELEASE Right 7/18/2017    Procedure: RIGHT ELBOW OPEN FLEXOR TENDON DEBRIDEMENT AND REPAIR;  Surgeon: FABIO Hess MD;  Location: [facility];  Service:        Current Outpatient Medications on File Prior to Visit   Medication Sig Dispense Refill   • ACCU-CHEK FASTCLIX LANCETS misc TEST TID  5   •  ACCU-CHEK SMARTVIEW test strip USE TO TEST BLOOD SUGAR BID  6   • aspirin (ASPIRIN ADULT LOW STRENGTH) 81 MG EC tablet Take 1 tablet by mouth Daily.     • atorvastatin (LIPITOR) 20 MG tablet TAKE 1 TABLET BY MOUTH DAILY 90 tablet 0   • benzonatate (TESSALON) 200 MG capsule TK 1 C PO TID PRN     • betamethasone dipropionate (DIPROLENE) 0.05 % ointment APPLY SPARINGLY TO RASH ON LEG TWICE DAILY AS NEEDED FOR ITCHING     • buPROPion XL (Wellbutrin XL) 300 MG 24 hr tablet Take 450 mg by mouth Daily. Has a 150 mg tablet and a 300 mg tablet in the morning     • Bystolic 10 MG tablet TAKE 1 TABLET BY MOUTH DAILY 90 tablet 3   • glimepiride (AMARYL) 4 MG tablet Take 4 mg by mouth Every Morning Before Breakfast.  5   • Hydrocortisone, Perianal, (ANUSOL-HC) 2.5 % rectal cream Insert  into the rectum.     • LANTUS SOLOSTAR 100 UNIT/ML injection pen Inject 21 Units under the skin into the appropriate area as directed Daily.     • lisinopril (PRINIVIL,ZESTRIL) 20 MG tablet TAKE 1 TABLET BY MOUTH EVERY DAY 90 tablet 0   • Synjardy XR 5-1000 MG tablet sustained-release 24 hour Take 2 tablets by mouth Daily.     • tamsulosin (FLOMAX) 0.4 MG capsule 24 hr capsule Take 0.8 mg by mouth Daily.     • [DISCONTINUED] Linagliptin-Metformin HCl (JENTADUETO) 2.5-1000 MG tablet Take 1 tablet by mouth 2 (Two) Times a Day. 60 tablet    • [DISCONTINUED] Mirabegron ER (Myrbetriq) 50 MG tablet sustained-release 24 hour 24 hr tablet Take 50 mg by mouth Daily.       No current facility-administered medications on file prior to visit.       Social History     Socioeconomic History   • Marital status:      Spouse name: Lucy Cox   • Number of children: Not on file   • Years of education: Not on file   • Highest education level: Not on file   Tobacco Use   • Smoking status: Former Smoker     Packs/day: 1.00     Years: 20.00     Pack years: 20.00     Types: Cigarettes     Start date:      Quit date:      Years since quittin.7  "  • Smokeless tobacco: Never Used   • Tobacco comment: Caffeine use 2 cups daily   Substance and Sexual Activity   • Alcohol use: No     Comment: Quit 1986//   • Drug use: No     Comment: sober since 35 h/o iv drug use   • Sexual activity: Defer           ROS    Procedures    ECG 12 Lead    Date/Time: 9/10/2021 1:42 PM  Performed by: Isabelle Flores MD  Authorized by: Isabelle Flores MD   Comparison: compared with previous ECG   Similar to previous ECG  Rhythm: sinus rhythm  Conduction: left anterior fascicular block    Clinical impression: abnormal EKG                Objective:      Vitals:    09/10/21 1340   BP: 118/80   Pulse: 77   Resp: 16   SpO2: 98%   Weight: 91.6 kg (202 lb)   Height: 182.9 cm (72\")     Body mass index is 27.4 kg/m².    Vitals reviewed.   Constitutional:       General: Not in acute distress.     Appearance: Well-developed. Not diaphoretic.   Eyes:      General: No scleral icterus.     Conjunctiva/sclera: Conjunctivae normal.   HENT:      Head: Normocephalic and atraumatic.   Neck:      Thyroid: No thyromegaly.      Vascular: No carotid bruit or JVD.      Lymphadenopathy: No cervical adenopathy.   Pulmonary:      Effort: Pulmonary effort is normal. No respiratory distress.      Breath sounds: Normal breath sounds. No wheezing. No rhonchi. No rales.   Chest:      Chest wall: Not tender to palpatation.   Cardiovascular:      Normal rate. Regular rhythm.      Murmurs: There is no murmur.      No gallop.   Pulses:     Intact distal pulses.   Edema:     Peripheral edema absent.   Abdominal:      General: Bowel sounds are normal. There is no distension or abdominal bruit.      Palpations: Abdomen is soft. There is no abdominal mass.      Tenderness: There is no abdominal tenderness.   Musculoskeletal:         General: No deformity.      Extremities: No clubbing present.     Cervical back: Neck supple. Skin:     General: Skin is warm and dry. There is no cyanosis.      Coloration: Skin is " not pale.      Findings: No rash.   Neurological:      Mental Status: Alert and oriented to person, place, and time.      Cranial Nerves: No cranial nerve deficit.   Psychiatric:         Judgment: Judgment normal.         Lab Review:       Assessment:      Diagnosis Plan   1. Dyspnea on exertion  Stress Test With Myocardial Perfusion One Day   2. Arteriosclerotic vascular disease  Stress Test With Myocardial Perfusion One Day   3. Essential hypertension  Stress Test With Myocardial Perfusion One Day   4. Mixed hyperlipidemia  Stress Test With Myocardial Perfusion One Day   5. LAFB (left anterior fascicular block)  Stress Test With Myocardial Perfusion One Day   6. Type 2 diabetes mellitus without complication, with long-term current use of insulin (CMS/[facility])  Stress Test With Myocardial Perfusion One Day     1. Normal stress perfusion study 8/2012 and 1/2015.  Abnormal stress echocardiogram done 2/2018, very mild CAD on cath 5/2/18, no angina.   2. Abnormal ECG. Left anterior fascicular block which is chronic.  3. Hypertension, goal <120/80.   4. Diabetes mellitus type 2, on insulin. Stable.  5. Obstructive sleep apnea on CPAP. Getting retested for this.  6. Hyperlipidemia. On atorvastatin.   7. Hepatitis C, stable.  8. History of rheumatoid fever, stable. No valvular heart problems.   9. History of gout.  10. Plaquing of the left carotid artery.       Plan:       See Maximino in 6 months, no medication changes, set up stress nuclear perfusion study.

## 2021-09-14 ENCOUNTER — PATIENT MESSAGE (OUTPATIENT)
Dept: CARDIOLOGY | Facility: CLINIC | Age: 71
End: 2021-09-14

## 2021-09-14 ENCOUNTER — TELEPHONE (OUTPATIENT)
Dept: CARDIOLOGY | Facility: CLINIC | Age: 71
End: 2021-09-14

## 2021-09-14 NOTE — TELEPHONE ENCOUNTER
Susan it looks like this was scheduled as a nuclear test but want to double check with you to make sure that I can confirm with the pt that the correct test has been scheduled       Thank You :)

## 2021-09-14 NOTE — TELEPHONE ENCOUNTER
"It is a nuclear stress -     rm    ----- Message from Makenna Jha CMA sent at 9/14/2021 10:42 AM EDT -----  Regarding: FW: Visit Follow-Up Question  Contact: [phone]    ----- Message -----  From: Mike Cox  Sent: 9/14/2021   9:18 AM EDT  To: Goran ryan [facility]  Subject: Visit Follow-Up Question                         Good morning.  When I saw Dr Flores last Friday she had said I was going to be scheduled for a nuclear stress test. Today \"the \" said it was going to be a regular stress test. I booked a regular but I want to make sure I'm doing the right test. Please advise.  Thank you,  Dr. Sid Cox  1950  [phone]      "

## 2021-09-16 ENCOUNTER — TELEPHONE (OUTPATIENT)
Dept: CARDIOLOGY | Facility: CLINIC | Age: 71
End: 2021-09-16

## 2021-09-16 ENCOUNTER — HOSPITAL ENCOUNTER (OUTPATIENT)
Dept: CARDIOLOGY | Facility: HOSPITAL | Age: 71
Discharge: HOME | End: 2021-09-16
Admitting: INTERNAL MEDICINE

## 2021-09-16 VITALS — BODY MASS INDEX: 27.36 KG/M2 | WEIGHT: 202 LBS | HEIGHT: 72 IN

## 2021-09-16 DIAGNOSIS — E11.9 TYPE 2 DIABETES MELLITUS WITHOUT COMPLICATION, WITH LONG-TERM CURRENT USE OF INSULIN (HCC): ICD-10-CM

## 2021-09-16 DIAGNOSIS — Z79.4 TYPE 2 DIABETES MELLITUS WITHOUT COMPLICATION, WITH LONG-TERM CURRENT USE OF INSULIN (HCC): ICD-10-CM

## 2021-09-16 DIAGNOSIS — I44.4 LAFB (LEFT ANTERIOR FASCICULAR BLOCK): ICD-10-CM

## 2021-09-16 DIAGNOSIS — I10 ESSENTIAL HYPERTENSION: ICD-10-CM

## 2021-09-16 DIAGNOSIS — E78.2 MIXED HYPERLIPIDEMIA: ICD-10-CM

## 2021-09-16 DIAGNOSIS — I70.90 ARTERIOSCLEROTIC VASCULAR DISEASE: ICD-10-CM

## 2021-09-16 DIAGNOSIS — R06.09 DYSPNEA ON EXERTION: ICD-10-CM

## 2021-09-16 LAB
BH CV NUCLEAR PRIOR STUDY: 3
BH CV REST NUCLEAR ISOTOPE DOSE: 11.4 MCI
BH CV STRESS BP STAGE 1: NORMAL
BH CV STRESS BP STAGE 2: NORMAL
BH CV STRESS DURATION MIN STAGE 1: 3
BH CV STRESS DURATION MIN STAGE 2: 3
BH CV STRESS DURATION MIN STAGE 3: 3
BH CV STRESS DURATION SEC STAGE 1: 0
BH CV STRESS DURATION SEC STAGE 2: 0
BH CV STRESS DURATION SEC STAGE 3: 0
BH CV STRESS GRADE STAGE 1: 10
BH CV STRESS GRADE STAGE 2: 12
BH CV STRESS GRADE STAGE 3: 14
BH CV STRESS HR STAGE 1: 98
BH CV STRESS HR STAGE 2: 119
BH CV STRESS HR STAGE 3: 143
BH CV STRESS METS STAGE 1: 5
BH CV STRESS METS STAGE 2: 7.5
BH CV STRESS METS STAGE 3: 10
BH CV STRESS NUCLEAR ISOTOPE DOSE: 33.2 MCI
BH CV STRESS PROTOCOL 1: NORMAL
BH CV STRESS RECOVERY BP: NORMAL MMHG
BH CV STRESS RECOVERY HR: 99 BPM
BH CV STRESS SPEED STAGE 1: 1.7
BH CV STRESS SPEED STAGE 2: 2.5
BH CV STRESS SPEED STAGE 3: 3.4
BH CV STRESS STAGE 1: 1
BH CV STRESS STAGE 2: 2
BH CV STRESS STAGE 3: 3
LV EF NUC BP: 61 %
MAXIMAL PREDICTED HEART RATE: 149 BPM
PERCENT MAX PREDICTED HR: 95.97 %
STRESS BASELINE BP: NORMAL MMHG
STRESS BASELINE HR: 79 BPM
STRESS PERCENT HR: 113 %
STRESS POST ESTIMATED WORKLOAD: 9 METS
STRESS POST EXERCISE DUR MIN: 7 MIN
STRESS POST EXERCISE DUR SEC: 47 SEC
STRESS POST PEAK BP: NORMAL MMHG
STRESS POST PEAK HR: 143 BPM
STRESS TARGET HR: 127 BPM

## 2021-09-16 PROCEDURE — 0 TECHNETIUM TETROFOSMIN KIT: Performed by: INTERNAL MEDICINE

## 2021-09-16 PROCEDURE — 93017 CV STRESS TEST TRACING ONLY: CPT

## 2021-09-16 PROCEDURE — 93016 CV STRESS TEST SUPVJ ONLY: CPT | Performed by: INTERNAL MEDICINE

## 2021-09-16 PROCEDURE — 93018 CV STRESS TEST I&R ONLY: CPT | Performed by: INTERNAL MEDICINE

## 2021-09-16 PROCEDURE — A9502 TC99M TETROFOSMIN: HCPCS | Performed by: INTERNAL MEDICINE

## 2021-09-16 PROCEDURE — 78452 HT MUSCLE IMAGE SPECT MULT: CPT | Performed by: INTERNAL MEDICINE

## 2021-09-16 PROCEDURE — 78452 HT MUSCLE IMAGE SPECT MULT: CPT

## 2021-09-16 RX ADMIN — TETROFOSMIN 1 DOSE: 1.38 INJECTION, POWDER, LYOPHILIZED, FOR SOLUTION INTRAVENOUS at 11:20

## 2021-09-16 RX ADMIN — TETROFOSMIN 1 DOSE: 1.38 INJECTION, POWDER, LYOPHILIZED, FOR SOLUTION INTRAVENOUS at 12:12

## 2021-09-16 NOTE — TELEPHONE ENCOUNTER
Notified pt. He verbalized understanding.    Thank you,    Chiqui Sánchez, RN  Triage [facility]

## 2021-10-26 ENCOUNTER — TELEPHONE (OUTPATIENT)
Dept: CARDIOLOGY | Facility: CLINIC | Age: 71
End: 2021-10-26

## 2021-10-26 NOTE — TELEPHONE ENCOUNTER
To whom it may concern:    Mike ZUNIGA Yaneliamy had a exercise stress test with myocardial perfusion using technetium tetrofosmin and SPECT imaging done 9/16/2021.       Isabelle Flores MD      Pt called stating that he had a nuclear stress test done on 9/16/2021    He is flying on Thursday and he states that he needs a note for the security cause last time he set off the alarm at the airport after having a nuclear stress test

## 2021-12-13 RX ORDER — NEBIVOLOL 10 MG/1
10 TABLET ORAL DAILY
Qty: 90 TABLET | Refills: 3 | Status: SHIPPED | OUTPATIENT
Start: 2021-12-13 | End: 2022-12-14

## 2022-05-10 ENCOUNTER — TELEPHONE (OUTPATIENT)
Dept: SURGERY | Facility: CLINIC | Age: 72
End: 2022-05-10

## 2022-05-10 NOTE — TELEPHONE ENCOUNTER
Hub staff attempted to follow warm transfer process and was unsuccessful     Caller: Mike Cox    Relationship to patient: Self    Best call back number: [phone]  Patient is needing: TO SCHEDULE COLONOSCOPY, REQUEST PACKET SENT. WOULD ALSO LIKE TO DISCUSS POSS HERNIA SURGERY W/ ANDERSON, PREFER FEMALE

## 2022-05-20 PROBLEM — R32 ABSENCE OF BLADDER CONTINENCE: Status: ACTIVE | Noted: 2020-12-21

## 2022-05-20 RX ORDER — METHYLPREDNISOLONE 4 MG/1
TABLET ORAL SEE ADMIN INSTRUCTIONS
COMMUNITY
Start: 2022-05-06 | End: 2022-05-23

## 2022-05-20 RX ORDER — LIRAGLUTIDE 6 MG/ML
1.8 INJECTION SUBCUTANEOUS DAILY
COMMUNITY
Start: 2022-03-01

## 2022-05-20 RX ORDER — BUPROPION HYDROCHLORIDE 150 MG/1
150 TABLET ORAL DAILY
COMMUNITY
Start: 2022-03-09

## 2022-05-20 RX ORDER — BUPROPION HYDROCHLORIDE 150 MG/1
TABLET ORAL
COMMUNITY
End: 2022-05-23

## 2022-05-23 ENCOUNTER — OFFICE VISIT (OUTPATIENT)
Dept: SURGERY | Facility: CLINIC | Age: 72
End: 2022-05-23

## 2022-05-23 VITALS
BODY MASS INDEX: 25.38 KG/M2 | HEIGHT: 73 IN | SYSTOLIC BLOOD PRESSURE: 112 MMHG | HEART RATE: 76 BPM | DIASTOLIC BLOOD PRESSURE: 58 MMHG | TEMPERATURE: 98.1 F | WEIGHT: 191.5 LBS | OXYGEN SATURATION: 98 %

## 2022-05-23 DIAGNOSIS — R19.4 CHANGE IN BOWEL HABITS: Primary | ICD-10-CM

## 2022-05-23 PROCEDURE — 46600 DIAGNOSTIC ANOSCOPY SPX: CPT | Performed by: PHYSICIAN ASSISTANT

## 2022-05-23 PROCEDURE — 99204 OFFICE O/P NEW MOD 45 MIN: CPT | Performed by: PHYSICIAN ASSISTANT

## 2022-05-23 RX ORDER — PEN NEEDLE, DIABETIC 32GX 5/32"
NEEDLE, DISPOSABLE MISCELLANEOUS SEE ADMIN INSTRUCTIONS
COMMUNITY
Start: 2022-05-04

## 2022-05-23 RX ORDER — EMPAGLIFLOZIN, METFORMIN HYDROCHLORIDE 10; 1000 MG/1; MG/1
1 TABLET, EXTENDED RELEASE ORAL 2 TIMES DAILY
COMMUNITY
Start: 2022-05-18

## 2022-05-23 RX ORDER — VALACYCLOVIR HYDROCHLORIDE 1 G/1
1000 TABLET, FILM COATED ORAL 2 TIMES DAILY
COMMUNITY
Start: 2022-05-06 | End: 2022-05-23

## 2022-05-23 RX ORDER — CEPHALEXIN 500 MG/1
500 CAPSULE ORAL 2 TIMES DAILY
COMMUNITY
Start: 2022-03-03 | End: 2022-05-23

## 2022-05-23 RX ORDER — AMOXICILLIN 875 MG/1
TABLET, COATED ORAL
COMMUNITY
Start: 2022-05-19 | End: 2022-05-23

## 2022-05-23 RX ORDER — ATORVASTATIN CALCIUM 80 MG/1
80 TABLET, FILM COATED ORAL DAILY
COMMUNITY
Start: 2022-03-22

## 2022-05-23 RX ORDER — CHLORHEXIDINE GLUCONATE 0.12 MG/ML
RINSE ORAL
COMMUNITY
Start: 2022-05-19 | End: 2022-05-23

## 2022-05-24 ENCOUNTER — PATIENT ROUNDING (BHMG ONLY) (OUTPATIENT)
Dept: SURGERY | Facility: CLINIC | Age: 72
End: 2022-05-24

## 2022-05-24 NOTE — PROGRESS NOTES
May 24, 2022    Hello, may I speak with Mike Cox?    My name is Bryn      I am  with [facility]  [address]  [facility] [address] [phone].    Before we get started may I verify your date of birth? 1950    I am calling to officially welcome you to our practice and ask about your recent visit. Is this a good time to talk? yes    Tell me about your visit with us. What things went well?  Everything was fine.       We're always looking for ways to make our patients' experiences even better. Do you have recommendations on ways we may improve?  no    Overall were you satisfied with your first visit to our practice? yes       I appreciate you taking the time to speak with me today. Is there anything else I can do for you? no      Thank you, and have a great day.

## 2022-06-02 NOTE — PROGRESS NOTES
CC:  Hernia and alternating constipation and diarrhea     Mike Cox is a 71 y.o. male who is seen today In follow-up, after a visit in July 2016, then September 2020 when he presented with an asymptomatic umbilical hernia.  This was first noted in 2013 on exam by myself, but the patient says it is increasing in size, and is becoming mildly symptomatic.   In the interim, we had considered pursuing it but he chose to wait.  Since then, he says the area has pain at the end of the day, 4/10.  It stays out all the time unless he is recumbent.      He says it is worse, but he really is here about his ventral diastasis.  It is not problematic as far as pain.  The symptoms of pressure and pushing out are more exaggerated when he is standing, better when recumbent.    Also complicating the potential for surgery although well controlled, are his diabetes (managed by Dr. Ireland) with hemoglobin A1c at 8.3 now, and hepatitis C in remission.  It is suspected that this was due to a tattoo or IV drug abuse at an early age, he being completely recuperated from such now.    He has a remote history of polyps, but Cscopes in 2006, 2009, 2013 and 2018 all without polyps.  He had mild sigmoid diverticulosis in 2018.  He also has a family history of colon cancer in his father's brother, diagnosed in his sixties.  We thus had planned on repeat in 2023.  Alternating constipation and diarrhea has become a problem.  His daughter has IBS.  He says sometimes the stool is tarry.  He doesn't feel like he has an ulcer problem.  He does not take NSAID's.     Past Medical History:   Diagnosis Date   • Achilles tendinitis of both lower extremities 06/2018   • Actinic keratoses 01/2020    FOLLOWED BY DR. NATHALY LINDSEY   • Alcohol abuse 1987    H/O ALCOHOL ABUSE, SOBER FOR MANY YEARS   • Allergic rhinitis    • Arteriosclerotic vascular disease     FOLLOWED BY DR. JOSE CHANG   • Asthma     MODERATE, PERSISTANT, FOLLOWED BY DR. GOMEZ  RUPAL   • BBB (bundle branch block)     LEFT POSTERIOR BUNDLE BRANCH HEMIBLOCK   • BCC (basal cell carcinoma) 09/2020    RIGHT EAR   • Benign paroxysmal positional vertigo of right ear 03/10/2020    SEEN AT    • BPH (benign prostatic hyperplasia)     FOLLOWED BY DR. BETSEY FAY   • Carotid artery disease (HCC)     RIGHT   • Cataract     BILATERAL   • Cellulitis of right foot 03/24/2018    SEEN AT    • Cellulitis of right upper arm 04/15/2018    SEEN AT    • Cervical radiculopathy 09/2020   • Chondromalacia, knee, right 01/2020   • Colon polyp     FOLLOWED BY DR. ALLIE BARRON   • Complex tear of medial meniscus of right knee 12/2019   • COPD (chronic obstructive pulmonary disease) (HCC)    • COVID-19 virus infection 08/04/2020    SEEN AT    • DDD (degenerative disc disease), cervical    • DDD (degenerative disc disease), thoracic    • Depression    • Diabetes mellitus (HCC) 2002    TYPE 2, IDDM, FOLLOWED BY DR. KAMILA MURCIA   • Diastasis of rectus abdominis 01/2021   • Diastolic dysfunction    • Dislocated temporomandibular joint 08/2021   • Dizziness 02/16/2021    SEEN AT [facility]   • Dry eye syndrome of both eyes    • Dyspnea on exertion 04/2018    with fatigue   • Elevated LFTs 08/2018   • Exostosis of left foot 12/2018   • Fecal smearing 02/2018   • Gout 05/2019   • Heart murmur    • Hemorrhoids    • Hepatitis C     In remission after Interferon and Ribavirin treatment   • HSV-2 infection    • Hyperkalemia 05/2020   • Hyperlipidemia    • Hypersomnia 10/2020   • Hypertension    • Hyperuricemia 05/2020   • Hyponatremia 05/2020   • LAFB (left anterior fascicular block) 02/2021   • Lichen simplex chronicus 04/2020   • Memory difficulty 02/2020   • Mild cognitive impairment    • Neutrophilia 08/2020   • Nocturia 04/2018   • Nocturnal hypoxemia     FOLLOWED BY DR. PATRICIA GOLDSMITH   • Non-bullous impetigo 01/2019   • NSAID long-term use    • PAULETTE (obstructive sleep apnea)     USES BIPAP, 2018 resolved, no  longer uses cpap   • Osteophyte 10/2018    FOLLOWED BY DR. ENRIKE COSTA   • Other synovitis and tenosynovitis, left ankle and foot 08/2018   • Peroneal tendonitis 05/2018    BILATERAL   • Plantar fascial fibromatosis 05/2018   • Polyneuropathy    • Pulmonary fibrosis (HCC)    • Purpura (HCC) 11/02/2019    SEEN AT    • PVC (premature ventricular contraction)    • Rheumatic fever     IN CHILDHOOD   • Seborrheic keratoses     FOLLOWED BY DR. NATHALY LINDSEY   • Segmental and somatic dysfunction of cervical region    • Segmental and somatic dysfunction of lumbar region    • Segmental and somatic dysfunction of sacral region    • Segmental and somatic dysfunction of thoracic region    • Sensorineural hearing loss (SNHL) of both ears     WEARS HEARING AIDS, FOLLOWED BY DR. JAE DOLL   • Sleep related bruxism 08/2021   • Strain of coccyx 11/2013   • Substance abuse ([facility])     H/O DRUG ABUSE, NOW SOBER( IV DRUG USE)   • Synovial cyst, popliteal, right 12/28/2019    SEEN AT [facility]   • Tachycardia 02/2021   • Tattoo reaction 04/16/2018    BACTERIAL INFECTION, SEEN AT [facility]   • Urge incontinence 03/2022   • Urinary incontinence 03/2020   • Vitamin B 12 deficiency    • Vitamin D deficiency    • Vitreous degeneration of both eyes    • Vitreous opacities of right eye      Past Surgical History:   Procedure Laterality Date   • ANKLE LIGAMENT RECONSTRUCTION Left 06/10/2019    Procedure: PERONEAL TENDON RECONSTRUCTION AND LEFT 5TH METATARSAL OSTECTOMY;  Surgeon: Enrike Costa MD;  [place] [facility] [place] [facility]  Service: Orthopedics   • CARDIAC CATHETERIZATION N/A 05/02/2018    Procedure: Coronary angiography;  Surgeon: Maik Preciado MD;  Location: [facility] CATH INVASIVE LOCATION;  Service: Cardiovascular   • CARDIAC CATHETERIZATION N/A 05/02/2018    Procedure: Left ventriculography;  Surgeon: Maik Preciado MD;  Location: [facility] CATH INVASIVE LOCATION;  Service: Cardiovascular   • COLONOSCOPY N/A 09/10/2013    Normal, repeat in  10 years-Dr. Svetlana Ornelas   • COLONOSCOPY N/A 11/17/2006    Normal, repeat in 5 years-Dr. Jackson Denise   • COLONOSCOPY N/A 08/25/2009    Normal, repeat in 5 years-Dr. Jackson Denise   • COLONOSCOPY N/A 04/02/2018    MILD SIGMOID DIVERTICULOSIS, RESCOPE IN 10 YRS, DR. SVTELANA ORNELAS AT [facility]   • CYST REMOVAL N/A 06/22/2015    ON FOREHEAD, EPIDERMOID/SEBACEOUS, DR. SAHIL PFEIFFER   • CYSTOSCOPY N/A    • KNEE SURGERY Right    • SHOULDER ARTHROSCOPY Left    • SHOULDER SURGERY Bilateral 1995    BONE SPURS, DR. TYRA HESS   • SKIN BIOPSY Right 09/23/2020    RIGHT SCAPHA, (+) NODULAR BCC, DR. NATHALY LINDSEY   • SKIN BIOPSY Bilateral 05/31/2018    LEFT CENTRAL PARIETAL SCALP, RIGHT MEDIAL FRONTAL SCALP, AND LEFT NASAL SIDEWALL, DR. NATHALY LINDSEY   • TENNIS ELBOW RELEASE Right 07/18/2017    Procedure: RIGHT ELBOW OPEN FLEXOR TENDON DEBRIDEMENT AND REPAIR;  Surgeon: FABIO Hess MD;  Location[facility];  Service:          Current Outpatient Medications:   •  ACCU-CHEK FASTCLIX LANCETS misc, TEST TID, Disp: , Rfl: 5  •  ACCU-CHEK SMARTVIEW test strip, USE TO TEST BLOOD SUGAR BID, Disp: , Rfl: 6  •  atorvastatin (LIPITOR) 80 MG tablet, Take 80 mg by mouth Daily., Disp: , Rfl:   •  BD Pen Needle Lien 2nd Gen 32G X 4 MM misc, See Admin Instructions., Disp: , Rfl:   •  buPROPion XL (WELLBUTRIN XL) 150 MG 24 hr tablet, Take 150 mg by mouth Daily., Disp: , Rfl:   •  glimepiride (AMARYL) 4 MG tablet, Take 4 mg by mouth 2 (Two) Times a Day., Disp: , Rfl: 5  •  insulin glargine (LANTUS, SEMGLEE) 100 UNIT/ML injection, Inject 14 Units under the skin into the appropriate area as directed Daily., Disp: , Rfl:   •  Liraglutide (Victoza) 18 MG/3ML solution pen-injector injection, Inject 1.8 mg under the skin into the appropriate area as directed Daily., Disp: , Rfl:   •  lisinopril (PRINIVIL,ZESTRIL) 20 MG tablet, TAKE 1 TABLET BY MOUTH EVERY DAY, Disp: 90 tablet, Rfl: 0  •  methylPREDNISolone (MEDROL) 4 MG dose pack, , Disp: ,  Rfl:   •  nebivolol (Bystolic) 10 MG tablet, Take 1 tablet by mouth Daily., Disp: 90 tablet, Rfl: 3  •  Synjardy XR  MG tablet sustained-release 24 hour, Take 1 tablet by mouth 2 (Two) Times a Day., Disp: , Rfl:   •  tamsulosin (FLOMAX) 0.4 MG capsule 24 hr capsule, Take 0.8 mg by mouth Daily., Disp: , Rfl:     No Known Allergies    Family History   Problem Relation Age of Onset   • Cancer Mother    • Lung disease Mother    • COPD Mother    • Osteoporosis Mother    • Ovarian cancer Mother    • Coronary artery disease Father    • Heart disease Father         Artery disease   • Hypertension Father         Artirial Disease   • Alcohol abuse Father    • Heart attack Father    • Heart failure Father    • Diabetes Sister    • Hyperlipidemia Sister    • Hypertension Sister    • Kidney disease Sister    • Colon cancer Paternal Uncle         diagnosed in 60s   • Hypertension Paternal Uncle    • Heart disease Paternal Uncle    • Diabetes Paternal Uncle    • Cancer Paternal Grandmother    • Coronary artery disease Paternal Grandfather    • Malig Hyperthermia Neg Hx      Social History     Tobacco Use   • Smoking status: Former Smoker     Packs/day: 1.00     Years: 20.00     Pack years: 20.00     Types: Cigarettes, Cigarettes     Start date: 1966     Quit date: 1986     Years since quittin.4   • Smokeless tobacco: Never Used   • Tobacco comment: Caffeine use 2 cups daily   Vaping Use   • Vaping Use: Never used   Substance Use Topics   • Alcohol use: No     Comment: Quit    • Drug use: No     Comment: sober since 35 h/o iv drug use     Occupation/Social: 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     Preventative Medicine  Colonoscopy: 2013, Dr. Ornelas. Normal colonoscopy at this time. Prior history of polyps, but Cscope  and  also normal.     Review of Systems   Respiratory: Positive for wheezing.    All other systems reviewed and are negative.    Vitals:    22  "1331   Weight: 89.4 kg (197 lb)   Height: 185.4 cm (73\")     Body mass index is 25.99 kg/m².    Physical Exam   Constitutional: He is oriented to person, place, and time. He appears well-developed.   HENT:   Head: Normocephalic and atraumatic.   Eyes: Conjunctivae are normal. No scleral icterus.   Neck: No tracheal deviation present.   Cardiovascular: Normal rate and regular rhythm.   No murmur heard.  Pulmonary/Chest: Effort normal. He has no wheezes.   Abdominal: Soft. Bowel sounds are normal. He exhibits no distension. There is no abdominal tenderness. A hernia (umbilical defect, approximately 1.5 cm, ventral diastasis almost 3 cm) is present.   Ventral diastasis noted   Musculoskeletal: Normal range of motion. No deformity.   Neurological: He is alert and oriented to person, place, and time.   Skin: Skin is warm and dry.   Bilateral upper extremity tattoos on his forearms   Psychiatric: His behavior is normal. Judgment and thought content normal.     Impression:  · Small umbilical hernia, enlarging, mildly symptomatic.  I think his symptoms are increasing enough that it is reasonable to proceed with surgery.      · Ventral diastasis.  This complicates matters and puts him at risk for mesh need if we operate due to the thinness of the superior fascial flap tissue.  · Family history of colon cancer in father's brother at age 60  · Personal history of colon polyps, Cscopes in 2006, 2009, 2013, 2018 without polyps.   · Diabates, most recent A1C, well controlled  · History of hepatitis C, in remission    Plan:  · C scope for screening now.  · EGD for tarry stools.  CBC to check for anemia.   · Proceed  umbilical hernia repair, possibly with mesh.  the only reason I would consider mesh is his associated ventral diastases..  · Continue activities such as sit ups as this is important to his overall health.  If the hernia gets larger with these activities, then we will proceed with surgery.     06/06/22   Svetlana Ornelas" MD    >35 minutes spent with developing plan prior to office visit and over half in counseling

## 2022-06-06 ENCOUNTER — PREP FOR SURGERY (OUTPATIENT)
Dept: OTHER | Facility: HOSPITAL | Age: 72
End: 2022-06-06

## 2022-06-06 ENCOUNTER — OFFICE VISIT (OUTPATIENT)
Dept: SURGERY | Facility: CLINIC | Age: 72
End: 2022-06-06

## 2022-06-06 VITALS — BODY MASS INDEX: 26.11 KG/M2 | HEIGHT: 73 IN | WEIGHT: 197 LBS

## 2022-06-06 DIAGNOSIS — Z86.010 HISTORY OF COLON POLYPS: Primary | ICD-10-CM

## 2022-06-06 DIAGNOSIS — M62.08 DIASTASIS RECTI: ICD-10-CM

## 2022-06-06 DIAGNOSIS — K43.9 VENTRAL HERNIA WITHOUT OBSTRUCTION OR GANGRENE: Primary | ICD-10-CM

## 2022-06-06 DIAGNOSIS — K92.1 TARRY STOOLS: ICD-10-CM

## 2022-06-06 DIAGNOSIS — Z86.010 HISTORY OF COLON POLYPS: ICD-10-CM

## 2022-06-06 DIAGNOSIS — Z86.19 HISTORY OF HEPATITIS C: ICD-10-CM

## 2022-06-06 PROCEDURE — 99214 OFFICE O/P EST MOD 30 MIN: CPT | Performed by: SURGERY

## 2022-06-06 RX ORDER — CELECOXIB 200 MG/1
200 CAPSULE ORAL ONCE
Status: CANCELLED | OUTPATIENT
Start: 2022-07-20 | End: 2022-06-06

## 2022-06-06 RX ORDER — SODIUM CHLORIDE 0.9 % (FLUSH) 0.9 %
10 SYRINGE (ML) INJECTION EVERY 12 HOURS SCHEDULED
Status: CANCELLED | OUTPATIENT
Start: 2022-07-20

## 2022-06-06 RX ORDER — METHYLPREDNISOLONE 4 MG/1
TABLET ORAL
COMMUNITY
Start: 2022-06-02 | End: 2022-09-15 | Stop reason: HOSPADM

## 2022-06-06 RX ORDER — INSULIN GLARGINE 100 [IU]/ML
20 INJECTION, SOLUTION SUBCUTANEOUS DAILY
COMMUNITY

## 2022-06-06 RX ORDER — ACETAMINOPHEN 325 MG/1
650 TABLET ORAL ONCE
Status: CANCELLED | OUTPATIENT
Start: 2022-07-20 | End: 2022-06-06

## 2022-06-06 RX ORDER — SODIUM CHLORIDE 0.9 % (FLUSH) 0.9 %
1-10 SYRINGE (ML) INJECTION AS NEEDED
Status: CANCELLED | OUTPATIENT
Start: 2022-07-20

## 2022-06-06 RX ORDER — OXYCODONE HCL 10 MG/1
10 TABLET, FILM COATED, EXTENDED RELEASE ORAL ONCE
Status: CANCELLED | OUTPATIENT
Start: 2022-07-20 | End: 2022-06-06

## 2022-06-08 ENCOUNTER — LAB (OUTPATIENT)
Dept: LAB | Facility: HOSPITAL | Age: 72
End: 2022-06-08

## 2022-06-08 ENCOUNTER — TELEPHONE (OUTPATIENT)
Dept: SURGERY | Facility: CLINIC | Age: 72
End: 2022-06-08

## 2022-06-08 DIAGNOSIS — K92.1 TARRY STOOLS: ICD-10-CM

## 2022-06-08 LAB
BASOPHILS # BLD AUTO: 0.1 10*3/MM3 (ref 0–0.2)
BASOPHILS NFR BLD AUTO: 0.9 % (ref 0–1.5)
DEPRECATED RDW RBC AUTO: 44.3 FL (ref 37–54)
EOSINOPHIL # BLD AUTO: 0.23 10*3/MM3 (ref 0–0.4)
EOSINOPHIL NFR BLD AUTO: 2 % (ref 0.3–6.2)
ERYTHROCYTE [DISTWIDTH] IN BLOOD BY AUTOMATED COUNT: 13.3 % (ref 12.3–15.4)
HCT VFR BLD AUTO: 48.9 % (ref 37.5–51)
HGB BLD-MCNC: 16.3 G/DL (ref 13–17.7)
IMM GRANULOCYTES # BLD AUTO: 0.17 10*3/MM3 (ref 0–0.05)
IMM GRANULOCYTES NFR BLD AUTO: 1.5 % (ref 0–0.5)
LYMPHOCYTES # BLD AUTO: 3.74 10*3/MM3 (ref 0.7–3.1)
LYMPHOCYTES NFR BLD AUTO: 31.9 % (ref 19.6–45.3)
MCH RBC QN AUTO: 30.4 PG (ref 26.6–33)
MCHC RBC AUTO-ENTMCNC: 33.3 G/DL (ref 31.5–35.7)
MCV RBC AUTO: 91.2 FL (ref 79–97)
MONOCYTES # BLD AUTO: 0.9 10*3/MM3 (ref 0.1–0.9)
MONOCYTES NFR BLD AUTO: 7.7 % (ref 5–12)
NEUTROPHILS NFR BLD AUTO: 56 % (ref 42.7–76)
NEUTROPHILS NFR BLD AUTO: 6.58 10*3/MM3 (ref 1.7–7)
NRBC BLD AUTO-RTO: 0 /100 WBC (ref 0–0.2)
PLATELET # BLD AUTO: 271 10*3/MM3 (ref 140–450)
PMV BLD AUTO: 9.7 FL (ref 6–12)
RBC # BLD AUTO: 5.36 10*6/MM3 (ref 4.14–5.8)
WBC NRBC COR # BLD: 11.72 10*3/MM3 (ref 3.4–10.8)

## 2022-06-08 PROCEDURE — 85025 COMPLETE CBC W/AUTO DIFF WBC: CPT

## 2022-06-08 PROCEDURE — 36415 COLL VENOUS BLD VENIPUNCTURE: CPT

## 2022-06-08 NOTE — TELEPHONE ENCOUNTER
----- Message from Svetlana Ornelas MD sent at 6/8/2022  1:51 PM EDT -----  Alisia (surgery office liaison),    Please call patient to inform him that his red cell count is normal and suggests that he does not have an ulcer

## 2022-06-08 NOTE — PROGRESS NOTES
Alisia (surgery office liaison),    Please call patient to inform him that his red cell count is normal and suggests that he does not have an ulcer

## 2022-06-23 ENCOUNTER — OFFICE VISIT (OUTPATIENT)
Dept: SURGERY | Facility: CLINIC | Age: 72
End: 2022-06-23

## 2022-06-23 VITALS
BODY MASS INDEX: 25.99 KG/M2 | HEART RATE: 79 BPM | OXYGEN SATURATION: 98 % | WEIGHT: 191.9 LBS | SYSTOLIC BLOOD PRESSURE: 100 MMHG | DIASTOLIC BLOOD PRESSURE: 60 MMHG | HEIGHT: 72 IN

## 2022-06-23 DIAGNOSIS — R19.4 CHANGE IN BOWEL HABITS: Primary | ICD-10-CM

## 2022-06-23 PROCEDURE — 99214 OFFICE O/P EST MOD 30 MIN: CPT | Performed by: PHYSICIAN ASSISTANT

## 2022-06-23 RX ORDER — BUDESONIDE AND FORMOTEROL FUMARATE DIHYDRATE 160; 4.5 UG/1; UG/1
2 AEROSOL RESPIRATORY (INHALATION) 2 TIMES DAILY
COMMUNITY
Start: 2022-06-14 | End: 2022-09-15 | Stop reason: HOSPADM

## 2022-06-23 RX ORDER — INSULIN GLARGINE 100 [IU]/ML
18 INJECTION, SOLUTION SUBCUTANEOUS DAILY
COMMUNITY
Start: 2022-06-23 | End: 2022-06-23 | Stop reason: SDUPTHER

## 2022-06-23 RX ORDER — BENZONATATE 200 MG/1
CAPSULE ORAL
COMMUNITY
Start: 2022-06-15 | End: 2022-09-15 | Stop reason: HOSPADM

## 2022-06-23 RX ORDER — BUPROPION HYDROCHLORIDE 300 MG/1
300 TABLET ORAL
COMMUNITY
Start: 2022-01-07 | End: 2022-09-15 | Stop reason: HOSPADM

## 2022-06-23 NOTE — PROGRESS NOTES
"Mike Cox is a 71 y.o. male in for follow up of change in bowel habits.     Fiber and miralax combination has helped. He has had one random episode of urgency with diarrhea that he describes as cleaning his colon out since our last visit. He has seen Dr. Ornelas in the interim as well, and she plans to perform an EGD, colonoscopy, and ventral hernia next month.     /60 (BP Location: Left arm, Patient Position: Sitting, Cuff Size: Adult)   Pulse 79   Ht 182.9 cm (72\")   Wt 87 kg (191 lb 14.4 oz)   SpO2 98%   BMI 26.03 kg/m²   Body mass index is 26.03 kg/m².  Vitals reviewed    PE:  Physical Exam  Vitals reviewed. Exam conducted with a chaperone present.   Constitutional:       General: He is not in acute distress.     Appearance: Normal appearance.   HENT:      Head: Normocephalic and atraumatic.   Eyes:      Extraocular Movements: Extraocular movements intact.   Cardiovascular:      Rate and Rhythm: Normal rate.   Pulmonary:      Effort: Pulmonary effort is normal.   Musculoskeletal:         General: Normal range of motion.      Cervical back: Normal range of motion.   Skin:     General: Skin is warm and dry.   Neurological:      General: No focal deficit present.      Mental Status: He is alert.   Psychiatric:         Mood and Affect: Mood normal.         Behavior: Behavior normal.       Assessment:   1. Change in bowel habits         Plan:  Colonoscopy next month as scheduled with Dr. Ornelas.   Follow up here after that if needed.        "

## 2022-07-05 ENCOUNTER — TELEPHONE (OUTPATIENT)
Dept: SURGERY | Facility: CLINIC | Age: 72
End: 2022-07-05

## 2022-07-15 ENCOUNTER — APPOINTMENT (OUTPATIENT)
Dept: LAB | Facility: HOSPITAL | Age: 72
End: 2022-07-15

## 2022-07-19 ENCOUNTER — APPOINTMENT (OUTPATIENT)
Dept: PREADMISSION TESTING | Facility: HOSPITAL | Age: 72
End: 2022-07-19

## 2022-08-02 ENCOUNTER — APPOINTMENT (OUTPATIENT)
Dept: LAB | Facility: HOSPITAL | Age: 72
End: 2022-08-02

## 2022-08-08 ENCOUNTER — PREP FOR SURGERY (OUTPATIENT)
Dept: OTHER | Facility: HOSPITAL | Age: 72
End: 2022-08-08

## 2022-08-08 DIAGNOSIS — R19.4 CHANGE IN BOWEL HABITS: Primary | ICD-10-CM

## 2022-08-10 PROBLEM — R19.4 CHANGE IN BOWEL HABITS: Status: ACTIVE | Noted: 2022-08-10

## 2022-08-16 ENCOUNTER — TELEPHONE (OUTPATIENT)
Dept: SURGERY | Facility: CLINIC | Age: 72
End: 2022-08-16

## 2022-08-16 NOTE — TELEPHONE ENCOUNTER
Patient called and cancelled his colonoscopy & EGD with Dr. Ornelas. He did not want to reschedule at this time.

## 2022-09-15 ENCOUNTER — HOSPITAL ENCOUNTER (OUTPATIENT)
Facility: HOSPITAL | Age: 72
Setting detail: HOSPITAL OUTPATIENT SURGERY
Discharge: HOME | End: 2022-09-15
Attending: COLON & RECTAL SURGERY | Admitting: COLON & RECTAL SURGERY

## 2022-09-15 ENCOUNTER — ANESTHESIA (OUTPATIENT)
Dept: GASTROENTEROLOGY | Facility: HOSPITAL | Age: 72
End: 2022-09-15

## 2022-09-15 ENCOUNTER — ANESTHESIA EVENT (OUTPATIENT)
Dept: GASTROENTEROLOGY | Facility: HOSPITAL | Age: 72
End: 2022-09-15

## 2022-09-15 VITALS
HEIGHT: 73 IN | BODY MASS INDEX: 25.84 KG/M2 | TEMPERATURE: 97.8 F | DIASTOLIC BLOOD PRESSURE: 87 MMHG | RESPIRATION RATE: 16 BRPM | HEART RATE: 77 BPM | WEIGHT: 195 LBS | OXYGEN SATURATION: 98 % | SYSTOLIC BLOOD PRESSURE: 126 MMHG

## 2022-09-15 DIAGNOSIS — R19.4 CHANGE IN BOWEL HABITS: ICD-10-CM

## 2022-09-15 LAB — GLUCOSE BLDC GLUCOMTR-MCNC: 146 MG/DL (ref 70–130)

## 2022-09-15 PROCEDURE — 88305 TISSUE EXAM BY PATHOLOGIST: CPT | Performed by: COLON & RECTAL SURGERY

## 2022-09-15 PROCEDURE — 25010000002 PROPOFOL 10 MG/ML EMULSION: Performed by: NURSE ANESTHETIST, CERTIFIED REGISTERED

## 2022-09-15 PROCEDURE — 82962 GLUCOSE BLOOD TEST: CPT

## 2022-09-15 RX ORDER — SODIUM CHLORIDE 0.9 % (FLUSH) 0.9 %
10 SYRINGE (ML) INJECTION AS NEEDED
Status: DISCONTINUED | OUTPATIENT
Start: 2022-09-15 | End: 2022-09-15 | Stop reason: HOSPADM

## 2022-09-15 RX ORDER — SODIUM CHLORIDE, SODIUM LACTATE, POTASSIUM CHLORIDE, CALCIUM CHLORIDE 600; 310; 30; 20 MG/100ML; MG/100ML; MG/100ML; MG/100ML
30 INJECTION, SOLUTION INTRAVENOUS CONTINUOUS PRN
Status: DISCONTINUED | OUTPATIENT
Start: 2022-09-15 | End: 2022-09-15 | Stop reason: HOSPADM

## 2022-09-15 RX ORDER — PROPOFOL 10 MG/ML
VIAL (ML) INTRAVENOUS AS NEEDED
Status: DISCONTINUED | OUTPATIENT
Start: 2022-09-15 | End: 2022-09-15 | Stop reason: SURG

## 2022-09-15 RX ORDER — LIDOCAINE HYDROCHLORIDE 20 MG/ML
INJECTION, SOLUTION INFILTRATION; PERINEURAL AS NEEDED
Status: DISCONTINUED | OUTPATIENT
Start: 2022-09-15 | End: 2022-09-15 | Stop reason: SURG

## 2022-09-15 RX ORDER — ONDANSETRON 2 MG/ML
4 INJECTION INTRAMUSCULAR; INTRAVENOUS ONCE AS NEEDED
Status: DISCONTINUED | OUTPATIENT
Start: 2022-09-15 | End: 2022-09-15 | Stop reason: HOSPADM

## 2022-09-15 RX ORDER — SODIUM CHLORIDE 0.9 % (FLUSH) 0.9 %
10 SYRINGE (ML) INJECTION EVERY 12 HOURS SCHEDULED
Status: DISCONTINUED | OUTPATIENT
Start: 2022-09-15 | End: 2022-09-15 | Stop reason: HOSPADM

## 2022-09-15 RX ADMIN — PROPOFOL 60 MG: 10 INJECTION, EMULSION INTRAVENOUS at 13:47

## 2022-09-15 RX ADMIN — LIDOCAINE HYDROCHLORIDE 60 MG: 20 INJECTION, SOLUTION INFILTRATION; PERINEURAL at 13:47

## 2022-09-15 RX ADMIN — SODIUM CHLORIDE, POTASSIUM CHLORIDE, SODIUM LACTATE AND CALCIUM CHLORIDE 30 ML/HR: 600; 310; 30; 20 INJECTION, SOLUTION INTRAVENOUS at 13:31

## 2022-09-15 RX ADMIN — PROPOFOL 160 MCG/KG/MIN: 10 INJECTION, EMULSION INTRAVENOUS at 13:47

## 2022-09-15 NOTE — DISCHARGE INSTRUCTIONS
For the next 24 hours patient needs to be with a responsible adult.    For 24 hours DO NOT drive, operate machinery, appliances, drink alcohol, make important decisions or sign legal documents.    Start with a light or bland diet if you are feeling sick to your stomach otherwise advance to regular diet as tolerated.    Follow recommendations on procedure report if provided by your doctor.    Call Dr. Sy for problems [phone]    Problems may include but not limited to: large amounts of bleeding, trouble breathing, repeated vomiting, severe unrelieved pain, fever or chills.

## 2022-09-15 NOTE — H&P
Mike Cox is a 71 y.o. male  who is referred by Lesli Sy MD for a colonoscopy. He   has an indications: change in bm.     He denies any change in bowel function, melena, or hematochezia.    Past Medical History:   Diagnosis Date   • Achilles tendinitis of both lower extremities 06/2018   • Actinic keratoses 01/2020    FOLLOWED BY DR. NATHALY LINDSEY   • Alcohol abuse 1987    H/O ALCOHOL ABUSE, SOBER FOR MANY YEARS   • Allergic rhinitis    • Arteriosclerotic vascular disease     FOLLOWED BY DR. JOSE CHANG   • Asthma     MODERATE, PERSISTANT, FOLLOWED BY DR. PATRICIA GOLDSMITH   • BBB (bundle branch block)     LEFT POSTERIOR BUNDLE BRANCH HEMIBLOCK; patient denies   • BCC (basal cell carcinoma) 09/2020    RIGHT EAR   • Benign paroxysmal positional vertigo of right ear 03/10/2020    SEEN AT    • BPH (benign prostatic hyperplasia)     FOLLOWED BY DR. BETSEY FAY   • Carotid artery disease (HCC)     RIGHT   • Cataract     BILATERAL   • Cellulitis of right foot 03/24/2018    SEEN AT    • Cellulitis of right upper arm 04/15/2018    SEEN AT    • Cervical radiculopathy 09/2020   • Chondromalacia, knee, right 01/2020   • Colon polyp     FOLLOWED BY DR. ALLIE BARRON   • Complex tear of medial meniscus of right knee 12/2019   • Constipation    • COPD (chronic obstructive pulmonary disease) ([facility])    • COVID-19 virus infection 08/04/2020    SEEN AT    • DDD (degenerative disc disease), cervical    • DDD (degenerative disc disease), thoracic    • Depression    • Diabetes mellitus ([facility]) 2002    TYPE 2, IDDM, FOLLOWED BY DR. KAMILA MURCIA   • Diarrhea    • Diastasis of rectus abdominis 01/2021   • Diastolic dysfunction    • Dislocated temporomandibular joint 08/2021   • Dizziness 02/16/2021    SEEN AT [facility]   • Dry eye syndrome of both eyes    • Dyspnea on exertion 04/2018    with fatigue   • Elevated LFTs 08/2018   • Exostosis of left foot 12/2018   • Fecal smearing 02/2018   • Gout 05/2019   •  Heart murmur    • Hemorrhoids    • Hepatitis C     In remission after Interferon and Ribavirin treatment   • HSV-2 infection    • Hyperkalemia 05/2020   • Hyperlipidemia    • Hypersomnia 10/2020   • Hypertension    • Hyperuricemia 05/2020   • Hyponatremia 05/2020   • LAFB (left anterior fascicular block) 02/2021   • Lichen simplex chronicus 04/2020   • Memory difficulty 02/2020   • Mild cognitive impairment    • Neutrophilia 08/2020   • Nocturia 04/2018   • Nocturnal hypoxemia     FOLLOWED BY DR. PATRICIA GOLDSMITH   • Non-bullous impetigo 01/2019   • NSAID long-term use    • PAULETTE (obstructive sleep apnea)     USES BIPAP, 2018 resolved, no longer uses cpap   • Osteophyte 10/2018    FOLLOWED BY DR. ENRIKE COSTA   • Other synovitis and tenosynovitis, left ankle and foot 08/2018   • Peroneal tendonitis 05/2018    BILATERAL   • Plantar fascial fibromatosis 05/2018   • Polyneuropathy    • Pulmonary fibrosis (HCC)    • Purpura (HCC) 11/02/2019    SEEN AT    • PVC (premature ventricular contraction)    • Rheumatic fever     IN CHILDHOOD   • Seborrheic keratoses     FOLLOWED BY DR. NATHALY LINDSEY   • Segmental and somatic dysfunction of cervical region    • Segmental and somatic dysfunction of lumbar region    • Segmental and somatic dysfunction of sacral region    • Segmental and somatic dysfunction of thoracic region    • Sensorineural hearing loss (SNHL) of both ears     WEARS HEARING AIDS, FOLLOWED BY DR. JAE DOLL   • Sleep related bruxism 08/2021   • Strain of coccyx 11/2013   • Substance abuse ([facility])     H/O DRUG ABUSE, NOW SOBER( IV DRUG USE)   • Synovial cyst, popliteal, right 12/28/2019    SEEN AT [facility]   • Tachycardia 02/2021   • Tattoo reaction 04/16/2018    BACTERIAL INFECTION, SEEN AT [facility]   • Urge incontinence 03/2022   • Urinary incontinence 03/2020   • Vitamin B 12 deficiency    • Vitamin D deficiency    • Vitreous degeneration of both eyes    • Vitreous opacities of right eye        Past Surgical  History:   Procedure Laterality Date   • ANKLE LIGAMENT RECONSTRUCTION Left 06/10/2019    Procedure: PERONEAL TENDON RECONSTRUCTION AND LEFT 5TH METATARSAL OSTECTOMY;  Surgeon: Ravi Ruby MD;  [place] [facility] [place] [facility] [place]  Service: Orthopedics; SURGERY ON SIDE OF FOOT, NOT ANKLE   • CARDIAC CATHETERIZATION N/A 05/02/2018    Procedure: Coronary angiography;  Surgeon: Maik Preciado MD;  Location: [facility] CATH INVASIVE LOCATION;  Service: Cardiovascular   • CARDIAC CATHETERIZATION N/A 05/02/2018    Procedure: Left ventriculography;  Surgeon: Maik Preciado MD;  Location: [facility] CATH INVASIVE LOCATION;  Service: Cardiovascular   • COLONOSCOPY N/A 09/10/2013    Normal, repeat in 10 years-Dr. Svetlana Ornelas   • COLONOSCOPY N/A 11/17/2006    Normal, repeat in 5 years-Dr. Jackson Denise   • COLONOSCOPY N/A 08/25/2009    Normal, repeat in 5 years-Dr. Jackson Denise   • COLONOSCOPY N/A 04/02/2018    MILD SIGMOID DIVERTICULOSIS, RESCOPE IN 10 YRS, DR. SVETLANA ORNELAS AT [facility]   • CYST REMOVAL N/A 06/22/2015    ON FOREHEAD, EPIDERMOID/SEBACEOUS, DR. SAHIL PFEIFFER   • CYSTOSCOPY N/A    • KNEE SURGERY Right     cortisone injections and knee scope, Dr. Steinberg   • SHOULDER ARTHROSCOPY Left    • SHOULDER SURGERY Bilateral 1995    BONE SPURS, DR. TYRA HESS   • SKIN BIOPSY Right 09/23/2020    RIGHT SCAPHA, (+) NODULAR BCC, DR. NATHALY LINDSEY   • SKIN BIOPSY Bilateral 05/31/2018    LEFT CENTRAL PARIETAL SCALP, RIGHT MEDIAL FRONTAL SCALP, AND LEFT NASAL SIDEWALL, DR. NATHALY LINDSEY   • TENNIS ELBOW RELEASE Right 07/18/2017    Procedure: RIGHT ELBOW OPEN FLEXOR TENDON DEBRIDEMENT AND REPAIR;  Surgeon: FABIO Hess MD;  [place] [facility] [place] [facility] [place]  Service:        Medications Prior to Admission   Medication Sig Dispense Refill Last Dose   • atorvastatin (LIPITOR) 80 MG tablet Take 80 mg by mouth Daily.   9/14/2022 at Unknown time   • benzonatate (TESSALON) 200 MG capsule TAKE 1 CAPSULE BY MOUTH THREE TIMES DAILY FOR UP TO 14 DAYS AS  NEEDED FOR COUGH      • buPROPion XL (WELLBUTRIN XL) 150 MG 24 hr tablet Take 150 mg by mouth Daily.   9/14/2022 at Unknown time   • glimepiride (AMARYL) 4 MG tablet Take 4 mg by mouth 2 (Two) Times a Day.  5 9/14/2022 at Unknown time   • insulin glargine (LANTUS, SEMGLEE) 100 UNIT/ML injection Inject 20 Units under the skin into the appropriate area as directed Daily.   9/14/2022 at Unknown time   • Liraglutide (Victoza) 18 MG/3ML solution pen-injector injection Inject 1.8 mg under the skin into the appropriate area as directed Daily.   Past Week at Unknown time   • lisinopril (PRINIVIL,ZESTRIL) 20 MG tablet TAKE 1 TABLET BY MOUTH EVERY DAY 90 tablet 0 9/14/2022 at Unknown time   • nebivolol (Bystolic) 10 MG tablet Take 1 tablet by mouth Daily. 90 tablet 3 9/15/2022 at Unknown time   • Synjardy XR  MG tablet sustained-release 24 hour Take 1 tablet by mouth 2 (Two) Times a Day.   9/14/2022 at Unknown time   • tamsulosin (FLOMAX) 0.4 MG capsule 24 hr capsule Take 0.8 mg by mouth Daily.   9/14/2022 at Unknown time   • ACCU-CHEK FASTCLIX LANCETS misc TEST TID  5 Unknown at Unknown time   • ACCU-CHEK SMARTVIEW test strip USE TO TEST BLOOD SUGAR BID  6 Unknown at Unknown time   • BD Pen Needle Lien 2nd Gen 32G X 4 MM misc See Admin Instructions.   Unknown at Unknown time   • buPROPion XL (WELLBUTRIN XL) 300 MG 24 hr tablet Take 300 mg by mouth.      • methylPREDNISolone (MEDROL) 4 MG dose pack       • Symbicort 160-4.5 MCG/ACT inhaler Inhale 2 puffs 2 (Two) Times a Day.          No Known Allergies    Family History   Problem Relation Age of Onset   • Cancer Mother    • Lung disease Mother    • COPD Mother    • Osteoporosis Mother    • Ovarian cancer Mother    • Coronary artery disease Father    • Heart disease Father         Artery disease   • Hypertension Father         Artirial Disease   • Alcohol abuse Father    • Heart attack Father    • Heart failure Father    • Diabetes Sister    • Hyperlipidemia Sister    •  Hypertension Sister    • Kidney disease Sister    • Colon cancer Paternal Uncle         diagnosed in 60s   • Hypertension Paternal Uncle    • Heart disease Paternal Uncle    • Diabetes Paternal Uncle    • Cancer Paternal Grandmother    • Coronary artery disease Paternal Grandfather    • Malig Hyperthermia Neg Hx        Social History     Socioeconomic History   • Marital status:      Spouse name: Lucy Cox   Tobacco Use   • Smoking status: Former Smoker     Packs/day: 1.00     Years: 20.00     Pack years: 20.00     Types: Cigarettes     Start date: 1966     Quit date: 1986     Years since quittin.7   • Smokeless tobacco: Never Used   • Tobacco comment: Caffeine use 2 cups daily   Vaping Use   • Vaping Use: Never used   Substance and Sexual Activity   • Alcohol use: No     Comment: Quit    • Drug use: No     Comment: sober since 35 h/o iv drug use   • Sexual activity: Not Currently     Partners: Female     Birth control/protection: None       Review of Systems   Gastrointestinal: Negative for abdominal pain, nausea and vomiting.   All other systems reviewed and are negative.      Vitals:    09/15/22 1318   BP: 142/93   Pulse: 81   Resp: 18   Temp: 98 °F (36.7 °C)   SpO2: 98%         Physical Exam  Constitutional:       Appearance: He is well-developed.   HENT:      Head: Normocephalic and atraumatic.   Eyes:      Pupils: Pupils are equal, round, and reactive to light.   Cardiovascular:      Rate and Rhythm: Regular rhythm.   Pulmonary:      Effort: Pulmonary effort is normal.   Abdominal:      General: There is no distension.      Palpations: Abdomen is soft.   Musculoskeletal:         General: Normal range of motion.   Skin:     General: Skin is warm and dry.   Neurological:      Mental Status: He is alert and oriented to person, place, and time.   Psychiatric:         Thought Content: Thought content normal.         Judgment: Judgment normal.           Assessment & Plan       indications: change in bm         I recommend colonoscopy.  I described risks, benefits of the procedure with the patient including but not limited to bleeding, infection, possibility of perforation and possible polypectomy. All of the patient's questions were answered and they would like to proceed with the above recommendations.

## 2022-09-15 NOTE — ANESTHESIA PREPROCEDURE EVALUATION
Anesthesia Evaluation     Patient summary reviewed and Nursing notes reviewed   no history of anesthetic complications:  NPO Solid Status: > 8 hours             Airway   Mallampati: II  TM distance: >3 FB  Neck ROM: full  Possible difficult intubation  Dental - normal exam     Pulmonary - normal exam   (+) COPD, asthma,shortness of breath, sleep apnea,   Cardiovascular   Exercise tolerance: good (4-7 METS)    ECG reviewed  Rhythm: regular    (+) hypertension, valvular problems/murmurs murmur, dysrhythmias Paroxysmal Atrial Fib, hyperlipidemia,  carotid artery disease  (-) past MI, angina, CHF, orthopnea, PND, MORRIS, murmur, carotid bruits, PVD    ROS comment: Normal cath & echo 1 year ago    Neuro/Psych  (+) dizziness/light headedness, numbness, psychiatric history,    (-) seizures, neuromuscular disease, TIA, CVA, weakness  GI/Hepatic/Renal/Endo    (+)   hepatitis C, liver disease, diabetes mellitus type 2 well controlled,     Musculoskeletal     Abdominal    Substance History   (+) alcohol use (sober),   (-) drug use     OB/GYN negative ob/gyn ROS         Other   arthritis,                        Anesthesia Plan    ASA 4     MAC     intravenous induction

## 2022-09-15 NOTE — ANESTHESIA POSTPROCEDURE EVALUATION
"Patient: Mike Cox    Procedure Summary     Date: 09/15/22 [facility] / [facility]    Anesthesia Start: 1342 Anesthesia Stop: 1408    Procedure: COLONOSCOPY to cecum with hot snare polypectomies (N/A ) Diagnosis:       Change in bowel habits      (Change in bowel habits [R19.4])    Surgeons: Lesli Sy MD Provider: Jose Manuel Mason MD    Anesthesia Type: MAC ASA Status: 4          Anesthesia Type: MAC    Vitals  Vitals Value Taken Time   /87 09/15/22 1427   Temp 36.6 °C (97.8 °F) 09/15/22 1417   Pulse 77 09/15/22 1427   Resp 16 09/15/22 1427   SpO2 98 % 09/15/22 1427           Post Anesthesia Care and Evaluation    Patient location during evaluation: bedside  Patient participation: complete - patient participated  Level of consciousness: awake and alert  Pain management: adequate    Airway patency: patent  Anesthetic complications: No anesthetic complications    Cardiovascular status: acceptable  Respiratory status: acceptable  Hydration status: acceptable    Comments: /87 (BP Location: Left arm, Patient Position: Sitting)   Pulse 77   Temp 36.6 °C (97.8 °F) (Infrared)   Resp 16   Ht 185.4 cm (73\")   Wt 88.5 kg (195 lb)   SpO2 98%   BMI 25.73 kg/m²     Patient is stable postoperatively and has adequately recovered from anesthesia as described above unless otherwise noted      "

## 2022-09-16 LAB
LAB AP CASE REPORT: NORMAL
PATH REPORT.FINAL DX SPEC: NORMAL
PATH REPORT.GROSS SPEC: NORMAL

## 2022-09-28 ENCOUNTER — TRANSCRIBE ORDERS (OUTPATIENT)
Dept: ADMINISTRATIVE | Facility: HOSPITAL | Age: 72
End: 2022-09-28

## 2022-09-28 DIAGNOSIS — R13.14 DYSPHAGIA, PHARYNGOESOPHAGEAL PHASE: Primary | ICD-10-CM

## 2022-10-04 ENCOUNTER — HOSPITAL ENCOUNTER (OUTPATIENT)
Dept: GENERAL RADIOLOGY | Facility: HOSPITAL | Age: 72
Discharge: HOME | End: 2022-10-04
Admitting: OTOLARYNGOLOGY

## 2022-10-04 DIAGNOSIS — R13.14 DYSPHAGIA, PHARYNGOESOPHAGEAL PHASE: ICD-10-CM

## 2022-10-04 PROCEDURE — 0 DIATRIZOATE MEGLUMINE & SODIUM PER 1 ML: Performed by: OTOLARYNGOLOGY

## 2022-10-04 PROCEDURE — 74220 X-RAY XM ESOPHAGUS 1CNTRST: CPT

## 2022-10-04 RX ADMIN — DIATRIZOATE MEGLUMINE AND DIATRIZOATE SODIUM 120 ML: 660; 100 LIQUID ORAL; RECTAL at 11:25

## 2022-12-14 RX ORDER — NEBIVOLOL 10 MG/1
10 TABLET ORAL DAILY
Qty: 90 TABLET | Refills: 3 | Status: SHIPPED | OUTPATIENT
Start: 2022-12-14

## 2023-04-11 ENCOUNTER — TRANSCRIBE ORDERS (OUTPATIENT)
Dept: ADMINISTRATIVE | Facility: HOSPITAL | Age: 73
End: 2023-04-11
Payer: MEDICARE

## 2023-04-11 ENCOUNTER — LAB (OUTPATIENT)
Dept: LAB | Facility: HOSPITAL | Age: 73
End: 2023-04-11
Payer: MEDICARE

## 2023-04-11 DIAGNOSIS — E11.65 POORLY CONTROLLED TYPE 2 DIABETES MELLITUS: ICD-10-CM

## 2023-04-11 DIAGNOSIS — I10 BENIGN ESSENTIAL HYPERTENSION: ICD-10-CM

## 2023-04-11 DIAGNOSIS — E78.2 MIXED HYPERLIPIDEMIA: ICD-10-CM

## 2023-04-11 DIAGNOSIS — E78.2 MIXED HYPERLIPIDEMIA: Primary | ICD-10-CM

## 2023-04-11 LAB
ALBUMIN SERPL-MCNC: 4 G/DL (ref 3.5–5.2)
ALBUMIN/GLOB SERPL: 1.5 G/DL
ALP SERPL-CCNC: 166 U/L (ref 39–117)
ALT SERPL W P-5'-P-CCNC: 23 U/L (ref 1–41)
ANION GAP SERPL CALCULATED.3IONS-SCNC: 9.7 MMOL/L (ref 5–15)
AST SERPL-CCNC: 19 U/L (ref 1–40)
BILIRUB SERPL-MCNC: 0.7 MG/DL (ref 0–1.2)
BUN SERPL-MCNC: 19 MG/DL (ref 8–23)
BUN/CREAT SERPL: 19 (ref 7–25)
CALCIUM SPEC-SCNC: 9.3 MG/DL (ref 8.6–10.5)
CHLORIDE SERPL-SCNC: 101 MMOL/L (ref 98–107)
CO2 SERPL-SCNC: 26.3 MMOL/L (ref 22–29)
CREAT SERPL-MCNC: 1 MG/DL (ref 0.76–1.27)
EGFRCR SERPLBLD CKD-EPI 2021: 79.5 ML/MIN/1.73
GLOBULIN UR ELPH-MCNC: 2.7 GM/DL
GLUCOSE SERPL-MCNC: 191 MG/DL (ref 65–99)
HBA1C MFR BLD: 7.5 % (ref 4.8–5.6)
POTASSIUM SERPL-SCNC: 4.4 MMOL/L (ref 3.5–5.2)
PROT SERPL-MCNC: 6.7 G/DL (ref 6–8.5)
SODIUM SERPL-SCNC: 137 MMOL/L (ref 136–145)

## 2023-04-11 PROCEDURE — 80053 COMPREHEN METABOLIC PANEL: CPT

## 2023-04-11 PROCEDURE — 36415 COLL VENOUS BLD VENIPUNCTURE: CPT

## 2023-04-11 PROCEDURE — 83036 HEMOGLOBIN GLYCOSYLATED A1C: CPT

## 2023-04-20 ENCOUNTER — TRANSCRIBE ORDERS (OUTPATIENT)
Dept: ADMINISTRATIVE | Facility: HOSPITAL | Age: 73
End: 2023-04-20
Payer: MEDICARE

## 2023-04-20 ENCOUNTER — OFFICE VISIT (OUTPATIENT)
Dept: CARDIOLOGY | Facility: CLINIC | Age: 73
End: 2023-04-20
Payer: MEDICARE

## 2023-04-20 VITALS
BODY MASS INDEX: 26.28 KG/M2 | HEART RATE: 84 BPM | DIASTOLIC BLOOD PRESSURE: 90 MMHG | HEIGHT: 72 IN | SYSTOLIC BLOOD PRESSURE: 124 MMHG | OXYGEN SATURATION: 97 % | WEIGHT: 194 LBS

## 2023-04-20 DIAGNOSIS — Z79.4 TYPE 2 DIABETES MELLITUS WITHOUT COMPLICATION, WITH LONG-TERM CURRENT USE OF INSULIN: ICD-10-CM

## 2023-04-20 DIAGNOSIS — Z13.6 ENCOUNTER FOR SCREENING FOR VASCULAR DISEASE: Primary | ICD-10-CM

## 2023-04-20 DIAGNOSIS — Z13.9 SCREENING DUE: Primary | ICD-10-CM

## 2023-04-20 DIAGNOSIS — R06.09 DYSPNEA ON EXERTION: Primary | ICD-10-CM

## 2023-04-20 DIAGNOSIS — R42 DIZZINESS: ICD-10-CM

## 2023-04-20 DIAGNOSIS — R06.02 SHORTNESS OF BREATH: ICD-10-CM

## 2023-04-20 DIAGNOSIS — E11.9 TYPE 2 DIABETES MELLITUS WITHOUT COMPLICATION, WITH LONG-TERM CURRENT USE OF INSULIN: ICD-10-CM

## 2023-04-20 DIAGNOSIS — I10 ESSENTIAL HYPERTENSION: ICD-10-CM

## 2023-04-20 DIAGNOSIS — E78.2 MIXED HYPERLIPIDEMIA: ICD-10-CM

## 2023-04-20 DIAGNOSIS — I44.4 LAFB (LEFT ANTERIOR FASCICULAR BLOCK): ICD-10-CM

## 2023-04-20 RX ORDER — EMPAGLIFLOZIN 25 MG/1
25 TABLET, FILM COATED ORAL EVERY MORNING
COMMUNITY
Start: 2023-01-23

## 2023-04-20 RX ORDER — MODAFINIL 100 MG/1
1 TABLET ORAL DAILY
COMMUNITY
Start: 2023-04-03

## 2023-04-20 NOTE — PROGRESS NOTES
"      [facility] CARDIOLOGY  [address] [facility] [address]  [facility] [address]  Phone: [phone]  Patient Name: Mike Cox  :1950  Age: 72 y.o.  Primary Cardiologist: Isabelle Flores MD  Encounter Provider:  TOOTIE Daley    History of Present Illness     Mike Cox is a 72 y.o.  male whose medical history includes hypertension, hyperlipidemia, and type 2 diabetes.  He is followed in our office by Dr. Flores for cardiovascular risk reduction. I have reviewed the past medical records in preparation of today's visit.     23 Follow-up:  He is here for 1-1/2-year follow-up and I am seeing him for the first time today.  He comes in because he notices that he has been feeling lightheaded to the point of near syncope with bending over.  He feels fine when moving from seated to standing position.  His blood pressure at home is usually 120s/80s.  He does have a occasional palpitations but these are not bothersome.  He also notices that he becomes winded when going up a short flight of steps; he did not feel winded when walking in from the parking lot today.  He denies chest pain.  He also rides his recumbent bike 20 minutes daily and feels fine doing this.  His weight is down 20 pounds on his scale with the use of semaglutide.  He is taking his medications as prescribed.    Data Review     The following data was reviewed by TOOTIE Daley on 23:    Vital Signs:   /90   Pulse 84   Ht 182.9 cm (72\")   Wt 88 kg (194 lb)   SpO2 97%   BMI 26.31 kg/m²       Weight:  Wt Readings from Last 3 Encounters:   23 88 kg (194 lb)   09/15/22 88.5 kg (195 lb)   22 87 kg (191 lb 14.4 oz)     Body mass index is 26.31 kg/m².    Below is a summary of pertinent cardiology findings:  •  he had an exercise nuclear stress test given his cardiac risk profile which was negative for ischemia.  He was also having " palpitations; these were treated and improved.  • January 2008 Holter monitor showed PVCs.  Stress nuclear perfusion study for fatigue showed no evidence of ischemia.  Echocardiogram showed EF 65%, grade 1 diastolic dysfunction, and no significant valvular disease.  • December 2009 vascular screening was normal.  • May 2011 carotid artery duplex study showed moderate intimal thickening of the right common carotid artery with mild thickening of the left carotid artery bulbs with no evidence of stenosis.  • April 2014 echocardiogram showed EF 54%, grade 1 diastolic dysfunction.  • January 2015 stress nuclear perfusion study showed no evidence of ischemia and EF 57%.  • April 2015 carotid artery duplex study showed moderate intimal thickening of the right internal carotid artery, and moderate plaque in the left internal carotid artery.  • February 2018 stress echocardiogram showed baseline EF 59% with post exercise EF 68% with septal hypokinesis which may be secondary to right bundle branch block.  There was also noted to be grade 1 diastolic dysfunction and no significant valvular disease.  This was done for dyspnea with exertion so he had cardiac catheterization.  May 2018 cardiac catheterization showed normal left main, large dominant normal left circumflex, small nondominant RCA, 10 to 20% proximal LAD stenosis with normal mid and distal LAD stenosis with 30 to 40% first diagonal stenosis; there was normal LV SF.  • August 2020 vascular screening showed bilateral carotid artery plaquing but no stenosis and otherwise normal vascular screening.  • March 2021 echocardiogram showed EF 55%, normal saline contrast study, normal diastolic function, and no significant valvular disease.  • September 2021 treadmill myocardial perfusion stress study showed no evidence of ischemia, and EF 61%.    Labs:  • 04/11/2023:  cr 1.0, K 4.4, , otherwise unremarkable CMP, HgbA1c 7.5      ECG 12 Lead    Date/Time: 4/20/2023 11:34  AM  Performed by: Anna Persaud APRN  Authorized by: Anna Persaud APRN   Comparison: compared with previous ECG from 9/10/2021  Similar to previous ECG  Rhythm: sinus rhythm  Rate: normal  BPM: 84  Conduction: left anterior fascicular block    Clinical impression: abnormal EKG            Medications     Allergies as of 04/20/2023   • (No Known Allergies)         Current Outpatient Medications:   •  ACCU-CHEK FASTCLIX LANCETS misc, TEST TID, Disp: , Rfl: 5  •  ACCU-CHEK SMARTVIEW test strip, USE TO TEST BLOOD SUGAR BID, Disp: , Rfl: 6  •  atorvastatin (LIPITOR) 80 MG tablet, Take 1 tablet by mouth Daily., Disp: , Rfl:   •  BD Pen Needle Lien 2nd Gen 32G X 4 MM misc, See Admin Instructions., Disp: , Rfl:   •  buPROPion XL (WELLBUTRIN XL) 150 MG 24 hr tablet, Take 1 tablet by mouth Daily., Disp: , Rfl:   •  glimepiride (AMARYL) 4 MG tablet, Take 1 tablet by mouth 2 (Two) Times a Day., Disp: , Rfl: 5  •  insulin glargine (LANTUS, SEMGLEE) 100 UNIT/ML injection, Inject 20 Units under the skin into the appropriate area as directed Daily., Disp: , Rfl:   •  Jardiance 25 MG tablet tablet, Take 1 tablet by mouth Every Morning., Disp: , Rfl:   •  lisinopril (PRINIVIL,ZESTRIL) 20 MG tablet, TAKE 1 TABLET BY MOUTH EVERY DAY, Disp: 90 tablet, Rfl: 0  •  modafinil (PROVIGIL) 100 MG tablet, Take 1 tablet by mouth Daily., Disp: , Rfl:   •  nebivolol (BYSTOLIC) 10 MG tablet, TAKE 1 TABLET BY MOUTH DAILY, Disp: 90 tablet, Rfl: 3  •  SEMAGLUTIDE,0.25 OR 0.5MG/DOS, SC, , Disp: , Rfl:   •  tamsulosin (FLOMAX) 0.4 MG capsule 24 hr capsule, Take 2 capsules by mouth Daily., Disp: , Rfl:      Past History, Review of Systems, Exam     Past Medical History:   Diagnosis Date   • Achilles tendinitis of both lower extremities 06/2018   • Actinic keratoses 01/2020   • Alcohol abuse 1987   • Allergic rhinitis    • Arteriosclerotic vascular disease    • Asthma    • BBB (bundle branch block)    • BCC (basal cell  carcinoma) 09/2020   • Benign paroxysmal positional vertigo of right ear 03/10/2020   • BPH (benign prostatic hyperplasia)    • Carotid artery disease    • Cataract    • Cellulitis of right foot 03/24/2018   • Cellulitis of right upper arm 04/15/2018   • Cervical radiculopathy 09/2020   • Chondromalacia, knee, right 01/2020   • Colon polyp    • Complex tear of medial meniscus of right knee 12/2019   • Constipation    • COPD (chronic obstructive pulmonary disease)    • COVID-19 virus infection 08/04/2020   • DDD (degenerative disc disease), cervical    • DDD (degenerative disc disease), thoracic    • Depression    • Diabetes mellitus 2002   • Diarrhea    • Diastasis of rectus abdominis 01/2021   • Diastolic dysfunction    • Dislocated temporomandibular joint 08/2021   • Dizziness 02/16/2021   • Dry eye syndrome of both eyes    • Dyspnea on exertion 04/2018   • Elevated LFTs 08/2018   • Exostosis of left foot 12/2018   • Fecal smearing 02/2018   • Gout 05/2019   • Heart murmur    • Hemorrhoids    • Hepatitis C    • HSV-2 infection    • Hyperkalemia 05/2020   • Hyperlipidemia    • Hypersomnia 10/2020   • Hypertension    • Hyperuricemia 05/2020   • Hyponatremia 05/2020   • LAFB (left anterior fascicular block) 02/2021   • Lichen simplex chronicus 04/2020   • Memory difficulty 02/2020   • Mild cognitive impairment    • Neutrophilia 08/2020   • Nocturia 04/2018   • Nocturnal hypoxemia    • Non-bullous impetigo 01/2019   • NSAID long-term use    • PAULETTE (obstructive sleep apnea)    • Osteophyte 10/2018   • Other synovitis and tenosynovitis, left ankle and foot 08/2018   • Peroneal tendonitis 05/2018   • Plantar fascial fibromatosis 05/2018   • Polyneuropathy    • Pulmonary fibrosis    • Purpura 11/02/2019   • PVC (premature ventricular contraction)    • Rheumatic fever    • Seborrheic keratoses    • Segmental and somatic dysfunction of cervical region    • Segmental and somatic dysfunction of lumbar region    • Segmental and  somatic dysfunction of sacral region    • Segmental and somatic dysfunction of thoracic region    • Sensorineural hearing loss (SNHL) of both ears    • Sleep related bruxism 2021   • Strain of coccyx 2013   • Substance abuse    • Synovial cyst, popliteal, right 2019   • Tachycardia 2021   • Tattoo reaction 2018   • Urge incontinence 2022   • Urinary incontinence 2020   • Vitamin B 12 deficiency    • Vitamin D deficiency    • Vitreous degeneration of both eyes    • Vitreous opacities of right eye        Past Surgical History:   has a past surgical history that includes Shoulder surgery (Bilateral, ); Cystoscopy (N/A); Tennis Elbow Release (Right, 2017); Colonoscopy (N/A, 09/10/2013); Colonoscopy (N/A, 2006); Colonoscopy (N/A, 2009); Cyst Removal (N/A, 2015); Colonoscopy (N/A, 2018); Cardiac catheterization (N/A, 2018); Cardiac catheterization (N/A, 2018); Ankle ligament reconstruction (Left, 06/10/2019); Knee surgery (Right); Shoulder arthroscopy (Left); Skin biopsy (Right, 2020); Skin biopsy (Bilateral, 2018); and Colonoscopy (N/A, 09/15/2022).     Social History     Socioeconomic History   • Marital status:      Spouse name: Lucy Cox   Tobacco Use   • Smoking status: Former     Packs/day: 1.00     Years: 20.00     Pack years: 20.00     Types: Cigarettes     Start date: 1966     Quit date: 1986     Years since quittin.3   • Smokeless tobacco: Never   • Tobacco comments:     Caffeine use 2 cups daily   Vaping Use   • Vaping Use: Never used   Substance and Sexual Activity   • Alcohol use: No     Comment: Quit    • Drug use: No     Comment: sober since 35 h/o iv drug use   • Sexual activity: Not Currently     Partners: Female     Birth control/protection: None       Review of Systems   Cardiovascular: Positive for dyspnea on exertion. Negative for chest pain, claudication, cyanosis, irregular heartbeat,  leg swelling, near-syncope, orthopnea, palpitations, paroxysmal nocturnal dyspnea and syncope.   Neurological: Positive for light-headedness.       Vitals reviewed.   Constitutional:       Appearance: Not in distress.   Eyes:      Conjunctiva/sclera: Conjunctivae normal.      Pupils: Pupils are equal, round, and reactive to light.   HENT:      Head: Normocephalic.      Nose: Nose normal.    Mouth/Throat:      Pharynx: Oropharynx is clear.   Neck:      Vascular: JVD normal.   Pulmonary:      Effort: Pulmonary effort is normal.      Breath sounds: Normal breath sounds. No wheezing. No rhonchi. No rales.   Cardiovascular:      Normal rate. Regular rhythm. Normal S1. Normal S2.      Murmurs: There is no murmur.   Pulses:     Intact distal pulses.   Edema:     Peripheral edema absent.   Abdominal:      General: Bowel sounds are normal. There is no distension.      Palpations: Abdomen is soft.      Tenderness: There is no abdominal tenderness.   Musculoskeletal: Normal range of motion.      Cervical back: Normal range of motion and neck supple. Skin:     General: Skin is warm and dry.   Neurological:      Mental Status: Alert and oriented to person, place and time.   Psychiatric:         Attention and Perception: Attention normal.         Mood and Affect: Mood normal.         Speech: Speech normal.         Behavior: Behavior is cooperative.          Assessment and Plan     Assessment:  1. Dyspnea on exertion    2. LAFB (left anterior fascicular block)    3. Essential hypertension    4. Mixed hyperlipidemia    5. Type 2 diabetes mellitus without complication, with long-term current use of insulin         1. Dyspnea with exertion: He has noted dyspnea when going up stairs but feels fine walking distances and riding his recumbent bike.  He also has lightheadedness when bending over.  2. Left anterior fascicular block.  This is chronic and stable.  He has had multiple negative stress test.  3. Hypertension: Controlled.  He  was not orthostatic when checked in office today.  4. Hyperlipidemia: He is treated with 80 mg atorvastatin daily.  5. Type 2 diabetes: He is on 25 mg Jardiance daily and semaglutide.    Mr. Cox is a patient of Dr. Flores with hypertension and type 2 diabetes.  He has been having some short windedness with walking stairs and some lightheadedness when bending over.  He is not orthostatic.  I am going to check echocardiogram.  Barring any surprises, I will send a preoperative cardiac risk assessment letter to Dr. Reji Choudhury; he is having left lower eyelid excision on June 9, 2023.    I am going to have him follow-up with Dr. Flores in 1 year.  I also recommended vascular screening.    Return in about 1 year (around 4/20/2024) for Follow-up with Dr. Flores.  Orders Placed This Encounter   Procedures   • ECG 12 Lead   • Adult Transthoracic Echo Complete W/ Cont if Necessary Per Protocol      No orders of the defined types were placed in this encounter.        Thank you for the opportunity to participate in this patient's care.    TOOTIE Tom    This office note has been dictated.

## 2023-04-27 ENCOUNTER — TELEPHONE (OUTPATIENT)
Dept: CARDIOLOGY | Facility: CLINIC | Age: 73
End: 2023-04-27
Payer: MEDICARE

## 2023-05-04 ENCOUNTER — HOSPITAL ENCOUNTER (OUTPATIENT)
Dept: CARDIOLOGY | Facility: HOSPITAL | Age: 73
Discharge: HOME | End: 2023-05-04
Admitting: NURSE PRACTITIONER
Payer: MEDICARE

## 2023-05-04 VITALS
HEIGHT: 72 IN | DIASTOLIC BLOOD PRESSURE: 80 MMHG | HEART RATE: 81 BPM | WEIGHT: 194 LBS | BODY MASS INDEX: 26.28 KG/M2 | SYSTOLIC BLOOD PRESSURE: 120 MMHG

## 2023-05-04 DIAGNOSIS — R06.09 DYSPNEA ON EXERTION: ICD-10-CM

## 2023-05-04 LAB
AORTIC ARCH: 3.1 CM
ASCENDING AORTA: 2.9 CM
BH CV ECHO MEAS - ACS: 1.95 CM
BH CV ECHO MEAS - AO MAX PG: 6.4 MMHG
BH CV ECHO MEAS - AO MEAN PG: 4.1 MMHG
BH CV ECHO MEAS - AO ROOT DIAM: 3 CM
BH CV ECHO MEAS - AO V2 MAX: 126.8 CM/SEC
BH CV ECHO MEAS - AO V2 VTI: 23.8 CM
BH CV ECHO MEAS - AVA(I,D): 2.8 CM2
BH CV ECHO MEAS - EDV(CUBED): 61.5 ML
BH CV ECHO MEAS - EDV(MOD-SP2): 62 ML
BH CV ECHO MEAS - EDV(MOD-SP4): 68 ML
BH CV ECHO MEAS - EF(MOD-BP): 55.6 %
BH CV ECHO MEAS - EF(MOD-SP2): 54.8 %
BH CV ECHO MEAS - EF(MOD-SP4): 55.9 %
BH CV ECHO MEAS - ESV(CUBED): 17.2 ML
BH CV ECHO MEAS - ESV(MOD-SP2): 28 ML
BH CV ECHO MEAS - ESV(MOD-SP4): 30 ML
BH CV ECHO MEAS - FS: 34.6 %
BH CV ECHO MEAS - IVS/LVPW: 1.08 CM
BH CV ECHO MEAS - IVSD: 1.11 CM
BH CV ECHO MEAS - LAT PEAK E' VEL: 6.8 CM/SEC
BH CV ECHO MEAS - LV DIASTOLIC VOL/BSA (35-75): 32.3 CM2
BH CV ECHO MEAS - LV MASS(C)D: 136.7 GRAMS
BH CV ECHO MEAS - LV MAX PG: 2.7 MMHG
BH CV ECHO MEAS - LV MEAN PG: 1.58 MMHG
BH CV ECHO MEAS - LV SYSTOLIC VOL/BSA (12-30): 14.3 CM2
BH CV ECHO MEAS - LV V1 MAX: 82.3 CM/SEC
BH CV ECHO MEAS - LV V1 VTI: 16.6 CM
BH CV ECHO MEAS - LVIDD: 3.9 CM
BH CV ECHO MEAS - LVIDS: 2.6 CM
BH CV ECHO MEAS - LVOT AREA: 4 CM2
BH CV ECHO MEAS - LVOT DIAM: 2.26 CM
BH CV ECHO MEAS - LVPWD: 1.03 CM
BH CV ECHO MEAS - MED PEAK E' VEL: 5 CM/SEC
BH CV ECHO MEAS - MV A DUR: 0.12 SEC
BH CV ECHO MEAS - MV A MAX VEL: 84.7 CM/SEC
BH CV ECHO MEAS - MV DEC SLOPE: 369.5 CM/SEC2
BH CV ECHO MEAS - MV DEC TIME: 0.17 MSEC
BH CV ECHO MEAS - MV E MAX VEL: 57.5 CM/SEC
BH CV ECHO MEAS - MV E/A: 0.68
BH CV ECHO MEAS - MV MAX PG: 4.4 MMHG
BH CV ECHO MEAS - MV MEAN PG: 1.86 MMHG
BH CV ECHO MEAS - MV P1/2T: 54.4 MSEC
BH CV ECHO MEAS - MV V2 VTI: 19.6 CM
BH CV ECHO MEAS - MVA(P1/2T): 4 CM2
BH CV ECHO MEAS - MVA(VTI): 3.4 CM2
BH CV ECHO MEAS - PA ACC TIME: 0.09 SEC
BH CV ECHO MEAS - PA PR(ACCEL): 37.4 MMHG
BH CV ECHO MEAS - PA V2 MAX: 111.3 CM/SEC
BH CV ECHO MEAS - PULM A REVS DUR: 0.1 SEC
BH CV ECHO MEAS - PULM A REVS VEL: 27.1 CM/SEC
BH CV ECHO MEAS - PULM DIAS VEL: 40.2 CM/SEC
BH CV ECHO MEAS - PULM S/D: 1.36
BH CV ECHO MEAS - PULM SYS VEL: 54.7 CM/SEC
BH CV ECHO MEAS - RV MAX PG: 2.27 MMHG
BH CV ECHO MEAS - RV V1 MAX: 75.4 CM/SEC
BH CV ECHO MEAS - RV V1 VTI: 14.9 CM
BH CV ECHO MEAS - SI(MOD-SP2): 16.2 ML/M2
BH CV ECHO MEAS - SI(MOD-SP4): 18.1 ML/M2
BH CV ECHO MEAS - SUP REN AO DIAM: 2.2 CM
BH CV ECHO MEAS - SV(LVOT): 66.5 ML
BH CV ECHO MEAS - SV(MOD-SP2): 34 ML
BH CV ECHO MEAS - SV(MOD-SP4): 38 ML
BH CV ECHO MEAS - TAPSE (>1.6): 2.04 CM
BH CV ECHO MEASUREMENTS AVERAGE E/E' RATIO: 9.75
BH CV XLRA - RV BASE: 3.4 CM
BH CV XLRA - RV LENGTH: 6.4 CM
BH CV XLRA - RV MID: 3.5 CM
BH CV XLRA - TDI S': 9.3 CM/SEC
LEFT ATRIUM VOLUME INDEX: 30.5 ML/M2
MAXIMAL PREDICTED HEART RATE: 147 BPM
SINUS: 3.2 CM
STJ: 2.45 CM
STRESS TARGET HR: 125 BPM

## 2023-05-04 PROCEDURE — 93306 TTE W/DOPPLER COMPLETE: CPT | Performed by: INTERNAL MEDICINE

## 2023-05-04 PROCEDURE — 93306 TTE W/DOPPLER COMPLETE: CPT

## 2023-05-10 ENCOUNTER — TELEPHONE (OUTPATIENT)
Dept: CARDIOLOGY | Facility: CLINIC | Age: 73
End: 2023-05-10
Payer: MEDICARE

## 2023-05-10 NOTE — TELEPHONE ENCOUNTER
Composed letter as requested and provided to Dr. Flores for signature.    Faxed signed letter to Dr. Choudhury's office of [phone] (success).    Provided signed letter to Denice for scanning.    Thank you,  Karina GARCIA RN  Triage Nurse [facility]   MEDICAL SCREENING EXAM (MSE) NOTE      Gary Lagunas is a 62 year old male who presented to the ER today for abnormal labs, chest pain, and shortness of breath.  Patient had outpatient labs showing hemoglobin of 6.8.  Patient was reporting shortness of breath and chest pain.  Patient is strong cardiac history.      Brief Physical Exam  General: Well-appearing  Neurologic: Alert and oriented      This patient was screened by me for the purpose of initial evaluation.  I have screened the patient for an acute medical condition and have placed orders for the patient's diagnosis and treatment.       David Regan PA-C  04/24/23 2122

## 2023-05-10 NOTE — LETTER
May 10, 2023    Patient: Mike Cox   YOB: 1950   Date of Visit: 5/5/2023         To Whom It May Concern:     It is my medical opinion that Mike Cox is considered at acceptable risk for surgery from a cardiovascular standpoint. According to the Brock's Revised Cardiac Risk Index, patient is considered low risk (0.9%) of adverse cardiovascular events occurring with moderate risk surgery.  He is not on any aspirin or blood thinners.               Isabelle Flores MD

## 2023-05-10 NOTE — TELEPHONE ENCOUNTER
Vidal from Dr. Choudhury's office called regarding Mike Cox.  Office has received clearance letter for patient.  Vidal is requesting letter be cosigned by MD as anesthesia will not accept a letter from an APRN.  Please see Donna's letter that can be found under the letters tab in chart review.    Please let me know how you would like to proceed.    Thank you,  Karina GARCIA RN  Triage Nurse [facility]

## 2023-05-12 ENCOUNTER — HOSPITAL ENCOUNTER (OUTPATIENT)
Dept: CT IMAGING | Facility: HOSPITAL | Age: 73
Discharge: HOME | End: 2023-05-12

## 2023-05-12 ENCOUNTER — HOSPITAL ENCOUNTER (OUTPATIENT)
Dept: CARDIOLOGY | Facility: HOSPITAL | Age: 73
Discharge: HOME | End: 2023-05-12

## 2023-05-12 DIAGNOSIS — Z13.6 ENCOUNTER FOR SCREENING FOR VASCULAR DISEASE: ICD-10-CM

## 2023-05-12 DIAGNOSIS — R42 DIZZINESS: ICD-10-CM

## 2023-05-12 DIAGNOSIS — R06.02 SHORTNESS OF BREATH: ICD-10-CM

## 2023-05-12 LAB
BH CV XLRA MEAS - MID AO DIAM: 2.1 CM
BH CV XLRA MEAS - PAD LEFT ABI PT: 1.38
BH CV XLRA MEAS - PAD LEFT ARM: 126 MMHG
BH CV XLRA MEAS - PAD LEFT LEG PT: 180 MMHG
BH CV XLRA MEAS - PAD RIGHT ABI PT: 1.34
BH CV XLRA MEAS - PAD RIGHT ARM: 130 MMHG
BH CV XLRA MEAS - PAD RIGHT LEG PT: 174 MMHG
BH CV XLRA MEAS LEFT DIST CCA EDV: -22.4 CM/SEC
BH CV XLRA MEAS LEFT DIST CCA PSV: -72.1 CM/SEC
BH CV XLRA MEAS LEFT ICA/CCA RATIO: 0.6
BH CV XLRA MEAS LEFT MID CCA PSV: 72 CM/SEC
BH CV XLRA MEAS LEFT MID ICA PSV: 43 CM/SEC
BH CV XLRA MEAS LEFT PROX ICA EDV: -15.5 CM/SEC
BH CV XLRA MEAS LEFT PROX ICA PSV: -42.9 CM/SEC
BH CV XLRA MEAS RIGHT DIST CCA EDV: -25.5 CM/SEC
BH CV XLRA MEAS RIGHT DIST CCA PSV: -82.6 CM/SEC
BH CV XLRA MEAS RIGHT ICA/CCA RATIO: 1
BH CV XLRA MEAS RIGHT MID CCA PSV: 83 CM/SEC
BH CV XLRA MEAS RIGHT MID ICA PSV: 81 CM/SEC
BH CV XLRA MEAS RIGHT PROX ICA EDV: -24.9 CM/SEC
BH CV XLRA MEAS RIGHT PROX ICA PSV: -81.4 CM/SEC
MAXIMAL PREDICTED HEART RATE: 147 BPM
STRESS TARGET HR: 125 BPM

## 2023-05-12 PROCEDURE — 75571 CT HRT W/O DYE W/CA TEST: CPT

## 2023-05-12 PROCEDURE — 93799 UNLISTED CV SVC/PROCEDURE: CPT

## 2023-05-12 RX ORDER — ASPIRIN 81 MG/1
81 TABLET ORAL DAILY
Qty: 30 TABLET | Refills: 11
Start: 2023-05-12

## 2023-05-15 ENCOUNTER — TELEPHONE (OUTPATIENT)
Dept: CARDIOLOGY | Facility: CLINIC | Age: 73
End: 2023-05-15
Payer: MEDICARE

## 2023-05-15 NOTE — TELEPHONE ENCOUNTER
----- Message from Isabelle Flores MD sent at 5/13/2023  8:19 AM EDT -----  Talk to me about him    rm  ----- Message -----  From: Anna Persaud APRN  Sent: 5/12/2023   4:53 PM EDT  To: Isabelle Flores MD    I called him about results of CT calcium score. He is reluctant to have stress test; he felt terrible with treadmill and chemical stress test. Do you think he needs cath?   I started him on 81 mg aspirin. TMM

## 2023-05-15 NOTE — TELEPHONE ENCOUNTER
Notified patient of the above.  He verbalized understanding.    Megha   Subjective:       Patient ID: Alexandra Martin is a 27 y.o. female.    The patient location is: [place]  The chief complaint leading to consultation is: headaches    Visit type: audiovisual    Face to Face time with patient: 15min  25 minutes of total time spent on the encounter, which includes face to face time and non-face to face time preparing to see the patient (eg, review of tests), Obtaining and/or reviewing separately obtained history, Documenting clinical information in the electronic or other health record, Independently interpreting results (not separately reported) and communicating results to the patient/family/caregiver, or Care coordination (not separately reported).     Each patient to whom he or she provides medical services by telemedicine is:  (1) informed of the relationship between the physician and patient and the respective role of any other health care provider with respect to management of the patient; and (2) notified that he or she may decline to receive medical services by telemedicine and may withdraw from such care at any time.    HPI   Patient presents for headaches--occur daily--frontal, neck/occipital. Has seen neurology--lost to follow up. Maxalt has helped in the past. Gabapentin makes her too drowsy. Cannot recall response to Topamax. Injections were discussed with neurology--patient interested in seeing them again   There were no vitals filed for this visit.  Review of Systems   Constitutional: Negative for activity change and unexpected weight change.   HENT: Negative for hearing loss, rhinorrhea and trouble swallowing.    Eyes: Negative for discharge and visual disturbance.   Respiratory: Negative for chest tightness and wheezing.    Cardiovascular: Negative for chest pain and palpitations.   Gastrointestinal: Negative for blood in stool, constipation, diarrhea and vomiting.   Endocrine: Negative for polydipsia and polyuria.   Genitourinary: Negative for difficulty  urinating and hematuria.   Musculoskeletal: Negative for neck pain.   Neurological: Positive for headaches.   Psychiatric/Behavioral: Negative for dysphoric mood.       Past Medical History:   Diagnosis Date    ADD (attention deficit disorder)     Anxiety disorder     Depression     Headache     History of ovarian cyst     Obesity     Substance abuse     Hospitalization     Trauma     not at this time. 9/11/19    Tympanic membrane perforation, left 3/14/2018    UTI (urinary tract infection) 8/3/2018    Vertigo      Objective:      Physical Exam  Constitutional:       General: She is not in acute distress.     Appearance: She is well-developed. She is not ill-appearing, toxic-appearing or diaphoretic.   HENT:      Right Ear: Hearing normal.      Left Ear: Hearing normal.   Pulmonary:      Effort: No tachypnea or respiratory distress.   Skin:     Coloration: Skin is not pale.   Neurological:      Mental Status: She is alert and oriented to person, place, and time.   Psychiatric:         Speech: Speech normal.         Behavior: Behavior normal.         Thought Content: Thought content normal.         Judgment: Judgment normal.         Assessment:       1. Intractable migraine with status migrainosus, unspecified migraine type        Plan:       Intractable migraine with status migrainosus, unspecified migraine type  -     rizatriptan (MAXALT) 10 MG tablet; Take one at the start of a Migraine Headache. May take one additional tablet in 2 hours if the headache persists.  Dispense: 12 tablet; Refill: 0  -     methocarbamoL (ROBAXIN) 500 MG Tab; Take 2 tablets (1,000 mg total) by mouth 4 (four) times daily. for 3 days  Dispense: 24 tablet; Refill: 0        schedule with neurology   Educated on medication safety and precautions       Medication List with Changes/Refills   New Medications    METHOCARBAMOL (ROBAXIN) 500 MG TAB    Take 2 tablets (1,000 mg total) by mouth 4 (four) times daily. for 3 days     RIZATRIPTAN (MAXALT) 10 MG TABLET    Take one at the start of a Migraine Headache. May take one additional tablet in 2 hours if the headache persists.   Current Medications    CLONAZEPAM (KLONOPIN) 0.5 MG TABLET    Take 0.5 tablets (0.25 mg total) by mouth daily as needed for Anxiety.    DEXTROAMPHETAMINE-AMPHETAMINE (ADDERALL) 20 MG TABLET    Take 1 tablet by mouth 2 (two) times a day.    SERTRALINE (ZOLOFT) 100 MG TABLET    Take 2 tablets (200 mg total) by mouth once daily.    TRIAMCINOLONE ACETONIDE 0.1% (KENALOG) 0.1 % PASTE       Discontinued Medications    AMITRIPTYLINE (ELAVIL) 25 MG TABLET    Take 1 tablet (25 mg total) by mouth nightly as needed for Insomnia.    CETIRIZINE (ZYRTEC) 10 MG TABLET    Take 1 tablet (10 mg total) by mouth once daily.

## 2023-05-15 NOTE — TELEPHONE ENCOUNTER
Please let Mr. Cox know that I will call him tomorrow about the plan after his CT calcium score. Dr. Flores and I are going to discuss it tomorrow when she is back in office.     Thanks!  TOOTIE Tom

## 2023-05-17 ENCOUNTER — TELEPHONE (OUTPATIENT)
Dept: CARDIOLOGY | Facility: CLINIC | Age: 73
End: 2023-05-17
Payer: MEDICARE

## 2023-05-17 DIAGNOSIS — I25.10 CORONARY ARTERY CALCIFICATION: ICD-10-CM

## 2023-05-17 DIAGNOSIS — I25.84 CORONARY ARTERY CALCIFICATION: ICD-10-CM

## 2023-05-17 DIAGNOSIS — E78.2 MIXED HYPERLIPIDEMIA: ICD-10-CM

## 2023-05-17 DIAGNOSIS — R06.09 DYSPNEA ON EXERTION: Primary | ICD-10-CM

## 2023-05-17 DIAGNOSIS — Z79.4 TYPE 2 DIABETES MELLITUS WITHOUT COMPLICATION, WITH LONG-TERM CURRENT USE OF INSULIN: ICD-10-CM

## 2023-05-17 DIAGNOSIS — E11.9 TYPE 2 DIABETES MELLITUS WITHOUT COMPLICATION, WITH LONG-TERM CURRENT USE OF INSULIN: ICD-10-CM

## 2023-05-17 DIAGNOSIS — R06.02 SHORTNESS OF BREATH: ICD-10-CM

## 2023-05-17 NOTE — TELEPHONE ENCOUNTER
I spoke with him about his coronary calcium score and advised given his dyspnea that we set up a cardiac catheterization.  He is amenable to proceed with this as he did not really want to do a stress study because he struggled with reaction to the stress agent at the last study.  He wants Dr. Preciado to do this.  We will make the arrangements.

## 2023-05-19 ENCOUNTER — TELEPHONE (OUTPATIENT)
Dept: CARDIOLOGY | Facility: CLINIC | Age: 73
End: 2023-05-19

## 2023-05-19 ENCOUNTER — TRANSCRIBE ORDERS (OUTPATIENT)
Dept: CARDIOLOGY | Facility: CLINIC | Age: 73
End: 2023-05-19
Payer: MEDICARE

## 2023-05-19 DIAGNOSIS — Z01.810 PRE-OPERATIVE CARDIOVASCULAR EXAMINATION: Primary | ICD-10-CM

## 2023-05-19 PROBLEM — I25.84 CORONARY ARTERY CALCIFICATION: Status: ACTIVE | Noted: 2023-05-19

## 2023-05-19 PROBLEM — I25.10 CORONARY ARTERY CALCIFICATION: Status: ACTIVE | Noted: 2023-05-19

## 2023-05-19 NOTE — TELEPHONE ENCOUNTER
Hub staff attempted to follow warm transfer process and was unsuccessful     Caller: MELISSA    Relationship to patient: SELF    Best call back number: [phone]    Patient is needing: PT WAS JUST SCHEDULED FOR A CATH NEXT WEEK WITH DR. AYALA HOWEVER HE JUST REALIZED HE CANNOT DO THAT DAY.    PLEASE CALL TO RESCHEDULE. I WAS UNABLE TO TRANSFER.  THANK YOU.

## 2023-05-22 ENCOUNTER — LAB (OUTPATIENT)
Dept: LAB | Facility: HOSPITAL | Age: 73
End: 2023-05-22
Payer: MEDICARE

## 2023-05-22 DIAGNOSIS — Z01.810 PRE-OPERATIVE CARDIOVASCULAR EXAMINATION: ICD-10-CM

## 2023-05-22 LAB
BASOPHILS # BLD AUTO: 0.07 10*3/MM3 (ref 0–0.2)
BASOPHILS NFR BLD AUTO: 0.7 % (ref 0–1.5)
DEPRECATED RDW RBC AUTO: 44.3 FL (ref 37–54)
EOSINOPHIL # BLD AUTO: 0.2 10*3/MM3 (ref 0–0.4)
EOSINOPHIL NFR BLD AUTO: 1.9 % (ref 0.3–6.2)
ERYTHROCYTE [DISTWIDTH] IN BLOOD BY AUTOMATED COUNT: 13.6 % (ref 12.3–15.4)
HCT VFR BLD AUTO: 49 % (ref 37.5–51)
HGB BLD-MCNC: 16.6 G/DL (ref 13–17.7)
IMM GRANULOCYTES # BLD AUTO: 0.09 10*3/MM3 (ref 0–0.05)
IMM GRANULOCYTES NFR BLD AUTO: 0.9 % (ref 0–0.5)
LYMPHOCYTES # BLD AUTO: 2.78 10*3/MM3 (ref 0.7–3.1)
LYMPHOCYTES NFR BLD AUTO: 26.5 % (ref 19.6–45.3)
MCH RBC QN AUTO: 30.2 PG (ref 26.6–33)
MCHC RBC AUTO-ENTMCNC: 33.9 G/DL (ref 31.5–35.7)
MCV RBC AUTO: 89.3 FL (ref 79–97)
MONOCYTES # BLD AUTO: 0.91 10*3/MM3 (ref 0.1–0.9)
MONOCYTES NFR BLD AUTO: 8.7 % (ref 5–12)
NEUTROPHILS NFR BLD AUTO: 6.44 10*3/MM3 (ref 1.7–7)
NEUTROPHILS NFR BLD AUTO: 61.3 % (ref 42.7–76)
NRBC BLD AUTO-RTO: 0 /100 WBC (ref 0–0.2)
PLATELET # BLD AUTO: 214 10*3/MM3 (ref 140–450)
PMV BLD AUTO: 9.6 FL (ref 6–12)
RBC # BLD AUTO: 5.49 10*6/MM3 (ref 4.14–5.8)
WBC NRBC COR # BLD: 10.49 10*3/MM3 (ref 3.4–10.8)

## 2023-05-22 PROCEDURE — 36415 COLL VENOUS BLD VENIPUNCTURE: CPT

## 2023-05-22 PROCEDURE — 85025 COMPLETE CBC W/AUTO DIFF WBC: CPT

## 2023-05-25 ENCOUNTER — TELEPHONE (OUTPATIENT)
Dept: CARDIOLOGY | Facility: CLINIC | Age: 73
End: 2023-05-25

## 2023-05-25 ENCOUNTER — HOSPITAL ENCOUNTER (OUTPATIENT)
Facility: HOSPITAL | Age: 73
Setting detail: HOSPITAL OUTPATIENT SURGERY
Discharge: HOME | End: 2023-05-25
Attending: INTERNAL MEDICINE | Admitting: INTERNAL MEDICINE
Payer: MEDICARE

## 2023-05-25 VITALS
BODY MASS INDEX: 26.01 KG/M2 | RESPIRATION RATE: 18 BRPM | SYSTOLIC BLOOD PRESSURE: 142 MMHG | DIASTOLIC BLOOD PRESSURE: 86 MMHG | TEMPERATURE: 98 F | OXYGEN SATURATION: 97 % | HEART RATE: 84 BPM | HEIGHT: 72 IN | WEIGHT: 192 LBS

## 2023-05-25 DIAGNOSIS — Z79.4 TYPE 2 DIABETES MELLITUS WITHOUT COMPLICATION, WITH LONG-TERM CURRENT USE OF INSULIN: ICD-10-CM

## 2023-05-25 DIAGNOSIS — E11.9 TYPE 2 DIABETES MELLITUS WITHOUT COMPLICATION, WITH LONG-TERM CURRENT USE OF INSULIN: ICD-10-CM

## 2023-05-25 DIAGNOSIS — I25.10 CORONARY ARTERY CALCIFICATION: ICD-10-CM

## 2023-05-25 DIAGNOSIS — E78.2 MIXED HYPERLIPIDEMIA: ICD-10-CM

## 2023-05-25 DIAGNOSIS — R06.09 DYSPNEA ON EXERTION: ICD-10-CM

## 2023-05-25 DIAGNOSIS — I25.84 CORONARY ARTERY CALCIFICATION: ICD-10-CM

## 2023-05-25 LAB
GLUCOSE BLDC GLUCOMTR-MCNC: 111 MG/DL (ref 70–130)
GLUCOSE BLDC GLUCOMTR-MCNC: 126 MG/DL (ref 70–130)

## 2023-05-25 PROCEDURE — 93458 L HRT ARTERY/VENTRICLE ANGIO: CPT | Performed by: INTERNAL MEDICINE

## 2023-05-25 PROCEDURE — 25010000002 FENTANYL CITRATE (PF) 50 MCG/ML SOLUTION: Performed by: INTERNAL MEDICINE

## 2023-05-25 PROCEDURE — C1769 GUIDE WIRE: HCPCS | Performed by: INTERNAL MEDICINE

## 2023-05-25 PROCEDURE — 25510000001 IOPAMIDOL PER 1 ML: Performed by: INTERNAL MEDICINE

## 2023-05-25 PROCEDURE — 25010000002 MIDAZOLAM PER 1 MG: Performed by: INTERNAL MEDICINE

## 2023-05-25 PROCEDURE — C1894 INTRO/SHEATH, NON-LASER: HCPCS | Performed by: INTERNAL MEDICINE

## 2023-05-25 PROCEDURE — 82948 REAGENT STRIP/BLOOD GLUCOSE: CPT

## 2023-05-25 PROCEDURE — S0260 H&P FOR SURGERY: HCPCS | Performed by: INTERNAL MEDICINE

## 2023-05-25 PROCEDURE — 25010000002 HEPARIN (PORCINE) PER 1000 UNITS: Performed by: INTERNAL MEDICINE

## 2023-05-25 RX ORDER — SODIUM CHLORIDE 0.9 % (FLUSH) 0.9 %
10 SYRINGE (ML) INJECTION EVERY 12 HOURS SCHEDULED
Status: DISCONTINUED | OUTPATIENT
Start: 2023-05-25 | End: 2023-05-25 | Stop reason: HOSPADM

## 2023-05-25 RX ORDER — MIDAZOLAM HYDROCHLORIDE 1 MG/ML
INJECTION INTRAMUSCULAR; INTRAVENOUS
Status: DISCONTINUED | OUTPATIENT
Start: 2023-05-25 | End: 2023-05-25 | Stop reason: HOSPADM

## 2023-05-25 RX ORDER — SODIUM CHLORIDE 9 MG/ML
40 INJECTION, SOLUTION INTRAVENOUS AS NEEDED
Status: DISCONTINUED | OUTPATIENT
Start: 2023-05-25 | End: 2023-05-25 | Stop reason: HOSPADM

## 2023-05-25 RX ORDER — SODIUM CHLORIDE 0.9 % (FLUSH) 0.9 %
10 SYRINGE (ML) INJECTION AS NEEDED
Status: DISCONTINUED | OUTPATIENT
Start: 2023-05-25 | End: 2023-05-25 | Stop reason: HOSPADM

## 2023-05-25 RX ORDER — ACETAMINOPHEN 325 MG/1
650 TABLET ORAL EVERY 4 HOURS PRN
Status: DISCONTINUED | OUTPATIENT
Start: 2023-05-25 | End: 2023-05-25 | Stop reason: HOSPADM

## 2023-05-25 RX ORDER — AMOXICILLIN 875 MG/1
875 TABLET, COATED ORAL DAILY
COMMUNITY
End: 2023-05-30

## 2023-05-25 RX ORDER — FENTANYL CITRATE 50 UG/ML
INJECTION, SOLUTION INTRAMUSCULAR; INTRAVENOUS
Status: DISCONTINUED | OUTPATIENT
Start: 2023-05-25 | End: 2023-05-25 | Stop reason: HOSPADM

## 2023-05-25 RX ORDER — HEPARIN SODIUM 1000 [USP'U]/ML
INJECTION, SOLUTION INTRAVENOUS; SUBCUTANEOUS
Status: DISCONTINUED | OUTPATIENT
Start: 2023-05-25 | End: 2023-05-25 | Stop reason: HOSPADM

## 2023-05-25 RX ORDER — SODIUM CHLORIDE 9 MG/ML
75 INJECTION, SOLUTION INTRAVENOUS CONTINUOUS
Status: DISCONTINUED | OUTPATIENT
Start: 2023-05-25 | End: 2023-05-25 | Stop reason: HOSPADM

## 2023-05-25 RX ORDER — LIDOCAINE HYDROCHLORIDE 20 MG/ML
INJECTION, SOLUTION INFILTRATION; PERINEURAL
Status: DISCONTINUED | OUTPATIENT
Start: 2023-05-25 | End: 2023-05-25 | Stop reason: HOSPADM

## 2023-05-25 RX ORDER — VERAPAMIL HYDROCHLORIDE 2.5 MG/ML
INJECTION, SOLUTION INTRAVENOUS
Status: DISCONTINUED | OUTPATIENT
Start: 2023-05-25 | End: 2023-05-25 | Stop reason: HOSPADM

## 2023-05-25 RX ADMIN — SODIUM CHLORIDE 75 ML/HR: 9 INJECTION, SOLUTION INTRAVENOUS at 09:28

## 2023-05-25 NOTE — DISCHARGE INSTRUCTIONS
[facility]  [address] [place] [address]    Coronary Angiogram (Radial/Ulnar Approach) After Care    Refer to this sheet in the next few weeks. These instructions provide you with information on caring for yourself after your procedure. Your caregiver may also give you more specific instructions. Your treatment has been planned according to current medical practices, but problems sometimes occur. Call your caregiver if you have any problems or questions after your procedure.    Home Care Instructions:  You may shower the day after the procedure. Remove the bandage (dressing) and gently wash the site with plain soap and water. Gently pat the site dry. You may apply a band aid daily for 2 days if desired.    Do not apply powder or lotion to the site.  Do not submerge the affected site in water for 3 to 5 days or until the site is completely healed.   Do not lift, push or pull anything over 5 pounds for 5 days after your procedure or as directed by your physician.  As a reference, a gallon of milk weighs 8 pounds.   Inspect the site at least twice daily. You may notice some bruising at the site and it may be tender for 1 to 2 weeks.     Increase your fluid intake for the next 2 days.    Keep arm elevated for 24 hours. For the remainder of the day, keep your arm in “Pledge of Allegiance” position when up and about.     You may drive 24 hours after the procedure unless otherwise instructed by your caregiver.  Do not operate machinery or power tools for 24 hours.  A responsible adult should be with you for the first 24 hours after you arrive home. Do not make any important legal decisions or sign legal papers for 24 hours.  Do not drink alcohol for 24 hours.    Metformin or any medications containing Metformin should not be taken for 48 hours after your procedure.      Call Your Doctor if:   You have unusual pain at the radial/ulnar (wrist) site.  You have redness, warmth, swelling, or pain at the  radial/ulnar (wrist) site.  You have drainage (other than a small amount of blood on the dressing).  `You have chills or a fever > 101.  Your arm becomes pale or dark, cool, tingly, or numb.  You develop chest pain, shortness of breath, feel faint or pass out.    You have heavy bleeding from the site, hold pressure on the site for 20 minutes.  If the bleeding stops, apply a fresh bandage and call your cardiologist.  However, if you        continue to have bleeding, call 911 and continue to apply pressure to the site.   You have any symptoms of a stroke.  Remember BE FAST  B-balance. Sudden trouble walking or loss of balance.  E-eyes.  Sudden changes in how you see or a sudden onset of a very bad headache.   F-face. Sudden weakness or loss of feeling of the face or facial droop on one side.   A-arms Sudden weakness or numbness in one arm.  One arm drifts down if they are both held out in front of you. This happens suddenly and usually on one side of the body.   S-speech.  Sudden trouble speaking, slurred speech or trouble understanding what are saying.   T-time  Time to call emergency services.  Write down the symptoms and the time they started.

## 2023-05-25 NOTE — Clinical Note
Hemostasis started on the right radial artery. Manual pressure applied to vessel. Manual pressure was held by shanna rtr. Manual pressure was held for 5 min. Hemostasis achieved successfully. Closure device additional comment: alida

## 2023-05-25 NOTE — TELEPHONE ENCOUNTER
Caller: Mike Cox    Relationship to patient: Self    Best call back number:[phone]     New or established patient?  [] New  [x] Established    Date of discharge: 05.25.23    Facility discharged from: [facility]    Diagnosis/Symptoms: CORONARY ANGIOGRAPHY    Length of stay (If applicable): 1 DAY    Specialty Only: Did you see a [facility] provider?    [x] Yes  [] No  If so, who? DR. AYALA

## 2023-05-25 NOTE — H&P
Date of Hospital Visit: 23  Encounter Provider: Maik Preciado MD  Place of Service: [facility] CARDIOLOGY  Patient Name: Mike Cox  :1950  [id number]    Chief complaint: Shortness of breath    History of Present Illness: 72-year-old gentleman with a history of diabetes hypertension he has been short of breath saw Dr. Flores had a calcium score done of 700 really did not want to have a stress test and so is referred directly for cardiac catheterization    Past Medical History:   Diagnosis Date   • Achilles tendinitis of both lower extremities 2018   • Actinic keratoses 2020    FOLLOWED BY DR. NATHALY LINDSEY   • Alcohol abuse     H/O ALCOHOL ABUSE, SOBER FOR MANY YEARS   • Allergic rhinitis    • Arteriosclerotic vascular disease     FOLLOWED BY DR. JOSE FLORES   • Asthma     MODERATE, PERSISTANT, FOLLOWED BY DR. PATRICIA GOLDSMITH   • BBB (bundle branch block)     LEFT POSTERIOR BUNDLE BRANCH HEMIBLOCK; patient denies   • BCC (basal cell carcinoma) 2020    RIGHT EAR   • Benign paroxysmal positional vertigo of right ear 03/10/2020    SEEN AT    • BPH (benign prostatic hyperplasia)     FOLLOWED BY DR. BETSEY FAY   • Carotid artery disease     RIGHT   • Cataract     BILATERAL   • Cellulitis of right foot 2018    SEEN AT    • Cellulitis of right upper arm 04/15/2018    SEEN AT    • Cervical radiculopathy 2020   • Chondromalacia, knee, right 2020   • Colon polyp     FOLLOWED BY DR. LILIBETH AMADOR   • Complex tear of medial meniscus of right knee 2019   • Constipation    • COPD (chronic obstructive pulmonary disease)    • COVID-19 virus infection 2020    SEEN AT    • DDD (degenerative disc disease), cervical    • DDD (degenerative disc disease), thoracic    • Depression    • Diabetes mellitus 2002    TYPE 2, IDDM, FOLLOWED BY DR. KAMILA MURCIA   • Diarrhea    • Diastasis of rectus abdominis 2021   • Diastolic  dysfunction    • Dislocated temporomandibular joint 08/2021   • Dizziness 02/16/2021    SEEN AT [facility]   • Dry eye syndrome of both eyes    • Dyspnea on exertion 04/2018    with fatigue   • Elevated LFTs 08/2018   • Exostosis of left foot 12/2018   • Fecal smearing 02/2018   • Gout 05/2019   • Heart murmur    • Hemorrhoids    • Hepatitis C     In remission after Interferon and Ribavirin treatment   • HSV-2 infection    • Hyperkalemia 05/2020   • Hyperlipidemia    • Hypersomnia 10/2020   • Hypertension    • Hyperuricemia 05/2020   • Hyponatremia 05/2020   • LAFB (left anterior fascicular block) 02/2021   • Lichen simplex chronicus 04/2020   • Memory difficulty 02/2020   • Mild cognitive impairment    • Neutrophilia 08/2020   • Nocturia 04/2018   • Nocturnal hypoxemia     FOLLOWED BY DR. PATRICIA GOLDSMITH   • Non-bullous impetigo 01/2019   • NSAID long-term use    • PAULETTE (obstructive sleep apnea)     USES BIPAP, 2018 resolved, no longer uses cpap   • Osteophyte 10/2018    FOLLOWED BY DR. ENRIKE COSTA   • Other synovitis and tenosynovitis, left ankle and foot 08/2018   • Peroneal tendonitis 05/2018    BILATERAL   • Plantar fascial fibromatosis 05/2018   • Polyneuropathy    • Pulmonary fibrosis    • Purpura 11/02/2019    SEEN AT    • PVC (premature ventricular contraction)    • Rheumatic fever     IN CHILDHOOD   • Seborrheic keratoses     FOLLOWED BY DR. NATHALY LINDSEY   • Segmental and somatic dysfunction of cervical region    • Segmental and somatic dysfunction of lumbar region    • Segmental and somatic dysfunction of sacral region    • Segmental and somatic dysfunction of thoracic region    • Sensorineural hearing loss (SNHL) of both ears     WEARS HEARING AIDS, FOLLOWED BY DR. JAE DOLL   • Sleep related bruxism 08/2021   • Strain of coccyx 11/2013   • Substance abuse     H/O DRUG ABUSE, NOW SOBER( IV DRUG USE)   • Synovial cyst, popliteal, right 12/28/2019    SEEN AT [facility]   • Tachycardia 02/2021   • Tattoo  reaction 04/16/2018    BACTERIAL INFECTION, SEEN AT [facility] ER   • Urge incontinence 03/2022   • Vitamin B 12 deficiency    • Vitamin D deficiency    • Vitreous degeneration of both eyes    • Vitreous opacities of right eye        Past Surgical History:   Procedure Laterality Date   • ANKLE LIGAMENT RECONSTRUCTION Left 06/10/2019    Procedure: PERONEAL TENDON RECONSTRUCTION AND LEFT 5TH METATARSAL OSTECTOMY;  Surgeon: Ravi Ruby MD;  Location:  [facility];  Service: Orthopedics; SURGERY ON SIDE OF FOOT, NOT ANKLE   • CARDIAC CATHETERIZATION N/A 05/02/2018    Procedure: Coronary angiography;  Surgeon: Maik Preciado MD;  Location:  [facility];  Service: Cardiovascular   • CARDIAC CATHETERIZATION N/A 05/02/2018    Procedure: Left ventriculography;  Surgeon: Maik Preciado MD;  Location:  [facility];  Service: Cardiovascular   • COLONOSCOPY N/A 09/10/2013    Normal, repeat in 10 years-Dr. Svetlana Ornelas   • COLONOSCOPY N/A 11/17/2006    Normal, repeat in 5 years-Dr. Jackson Denise   • COLONOSCOPY N/A 08/25/2009    Normal, repeat in 5 years-Dr. Jackson Denise   • COLONOSCOPY N/A 04/02/2018    MILD SIGMOID DIVERTICULOSIS, RESCOPE IN 10 YRS, DR. SVETLANA ORNELAS AT [facility]   • COLONOSCOPY N/A 09/15/2022    6 MM TUBULAR ADENOMA POLYP IN DESCENDING, 4 MM BENIGN POLYP IN RECTUM, MULTIPLE DIVERTICULA IN SIGMOID, RESCOPE IN 5 YRS, DR. LILIBETH AMADOR AT [facility]   • CYST REMOVAL N/A 06/22/2015    ON FOREHEAD, EPIDERMOID/SEBACEOUS, DR. SAHIL PFEIFFER   • CYSTOSCOPY N/A    • KNEE SURGERY Right     cortisone injections and knee scope, Dr. Steinberg   • SHOULDER SURGERY Bilateral 1995    BONE SPURS, DR. TYRA MURILLO   • SKIN BIOPSY Right 09/23/2020    RIGHT SCAPHA, (+) NODULAR BCC, DR. NATHALY LINDSEY   • SKIN BIOPSY Bilateral 05/31/2018    LEFT CENTRAL PARIETAL SCALP, RIGHT MEDIAL FRONTAL SCALP, AND LEFT NASAL SIDEWALL, DR. NATHALY LINDSEY   • TENNIS ELBOW RELEASE Right 07/18/2017    Procedure: RIGHT ELBOW  OPEN FLEXOR TENDON DEBRIDEMENT AND REPAIR;  Surgeon: FABIO Hess MD;  [facility] [place];  Service:        Medications Prior to Admission   Medication Sig Dispense Refill Last Dose   • amoxicillin (AMOXIL) 875 MG tablet Take 1 tablet by mouth Daily.   5/25/2023   • atorvastatin (LIPITOR) 80 MG tablet Take 1 tablet by mouth Daily.   5/24/2023   • buPROPion XL (WELLBUTRIN XL) 150 MG 24 hr tablet Take 1 tablet by mouth Daily.   5/24/2023   • glimepiride (AMARYL) 4 MG tablet Take 1 tablet by mouth 2 (Two) Times a Day.  5 5/24/2023   • insulin glargine (LANTUS, SEMGLEE) 100 UNIT/ML injection Inject 50 Units under the skin into the appropriate area as directed Daily.   5/24/2023   • Jardiance 25 MG tablet tablet Take 1 tablet by mouth Every Morning.   5/24/2023   • lisinopril (PRINIVIL,ZESTRIL) 20 MG tablet TAKE 1 TABLET BY MOUTH EVERY DAY 90 tablet 0 5/25/2023   • modafinil (PROVIGIL) 100 MG tablet Take 1 tablet by mouth Daily.   5/24/2023   • nebivolol (BYSTOLIC) 10 MG tablet TAKE 1 TABLET BY MOUTH DAILY 90 tablet 3 5/25/2023   • tamsulosin (FLOMAX) 0.4 MG capsule 24 hr capsule Take 2 capsules by mouth Daily.   5/24/2023   • ACCU-CHEK FASTCLIX LANCETS misc TEST TID  5    • ACCU-CHEK SMARTVIEW test strip USE TO TEST BLOOD SUGAR BID  6    • aspirin 81 MG EC tablet Take 1 tablet by mouth Daily. 30 tablet 11 5/22/2023   • BD Pen Needle Lien 2nd Gen 32G X 4 MM misc See Admin Instructions.      • SEMAGLUTIDE,0.25 OR 0.5MG/DOS, SC 1 (One) Time Per Week. MONDAY 5/22/2023       Current Meds  No current facility-administered medications on file prior to encounter.     Current Outpatient Medications on File Prior to Encounter   Medication Sig Dispense Refill   • amoxicillin (AMOXIL) 875 MG tablet Take 1 tablet by mouth Daily.     • atorvastatin (LIPITOR) 80 MG tablet Take 1 tablet by mouth Daily.     • buPROPion XL (WELLBUTRIN XL) 150 MG 24 hr tablet Take 1 tablet by mouth Daily.     • glimepiride (AMARYL) 4 MG tablet  Take 1 tablet by mouth 2 (Two) Times a Day.  5   • insulin glargine (LANTUS, SEMGLEE) 100 UNIT/ML injection Inject 50 Units under the skin into the appropriate area as directed Daily.     • Jardiance 25 MG tablet tablet Take 1 tablet by mouth Every Morning.     • lisinopril (PRINIVIL,ZESTRIL) 20 MG tablet TAKE 1 TABLET BY MOUTH EVERY DAY 90 tablet 0   • modafinil (PROVIGIL) 100 MG tablet Take 1 tablet by mouth Daily.     • nebivolol (BYSTOLIC) 10 MG tablet TAKE 1 TABLET BY MOUTH DAILY 90 tablet 3   • tamsulosin (FLOMAX) 0.4 MG capsule 24 hr capsule Take 2 capsules by mouth Daily.     • ACCU-CHEK FASTCLIX LANCETS misc TEST TID  5   • ACCU-CHEK SMARTVIEW test strip USE TO TEST BLOOD SUGAR BID  6   • aspirin 81 MG EC tablet Take 1 tablet by mouth Daily. 30 tablet 11   • BD Pen Needle Lien 2nd Gen 32G X 4 MM misc See Admin Instructions.     • SEMAGLUTIDE,0.25 OR 0.5MG/DOS, SC 1 (One) Time Per Week. MONDAY         Social History     Socioeconomic History   • Marital status:      Spouse name: Lucy Cox   Tobacco Use   • Smoking status: Former     Packs/day: 1.00     Years: 20.00     Pack years: 20.00     Types: Cigarettes     Start date: 1966     Quit date: 1986     Years since quittin.4   • Smokeless tobacco: Never   • Tobacco comments:     Caffeine use 2 cups daily   Vaping Use   • Vaping Use: Never used   Substance and Sexual Activity   • Alcohol use: No     Comment: Quit    • Drug use: No     Comment: sober since 35 h/o iv drug use   • Sexual activity: Not Currently     Partners: Female     Birth control/protection: None       Family Hx: Non-contributory    REVIEW OF SYSTEMS:   ROS was performed and is negative except as outlined in HPI     REVIEW OF SYSTEMS:   CONSTITUTIONAL: No weight loss, fever, chills, weakness or fatigue.   HEENT: Eyes: No visual loss, blurred vision, double vision or yellow sclerae. Ears, Nose, Throat: No hearing loss, sneezing, congestion, runny nose or sore  "throat.   SKIN: No rash or itching.     RESPIRATORY: No shortness of breath, hemoptysis, cough or sputum.   GASTROINTESTINAL: No anorexia, nausea, vomiting or diarrhea. No abdominal pain, bright red blood per rectum or melena.  NEUROLOGICAL: No headache, dizziness, syncope, paralysis, numbness or tingling in the extremities.  MUSCULOSKELETAL: No muscle, back pain, joint pain or stiffness.   HEMATOLOGIC: No anemia, bleeding or bruising.   LYMPHATICS: No enlarged nodes.  PSYCHIATRIC: No history of depression, anxiety, hallucinations.   ENDOCRINOLOGIC: No reports of sweating, cold or heat intolerance. No polyuria or polydipsia.        Objective:     Vitals:    05/25/23 0925   BP: 150/89   BP Location: Left arm   Patient Position: Lying   Pulse: 92   Resp: 20   Temp: 98 °F (36.7 °C)   TempSrc: Temporal   SpO2: 98%   Weight: 87.1 kg (192 lb)   Height: 182.9 cm (72\")     Body mass index is 26.04 kg/m².  Flowsheet Rows    Flowsheet Row First Filed Value   Admission Height 182.9 cm (72\") Documented at 05/25/2023 0925   Admission Weight 87.1 kg (192 lb) Documented at 05/25/2023 0925          General Appearance:    Alert, oriented x 3, in no acute distress   Head:    Normocephalic, without obvious abnormality, atraumatic   Ears:    Ears appear intact with no abnormalities noted   Throat:   No oral lesions, dentition good   Neck:   No adenopathy, supple, trachea midline, no thyromegaly, no carotid bruit, no JVD   Lungs:    Breath sounds are equal and  clear to auscultation    Heart:   Normal S1 and S2, RRR, no murmur/gallop or rub   Abdomen:    Normal bowel sounds, obese, soft non-tender, non-distended, no organomegaly, no guarding   Extremities:   Moves all extremities well, no edema, no cyanosis, no redness   Pulses:   Pulses palpable and equal bilaterally. Normal radial pulses   Skin:   No bleeding, bruising or rash   Lymph nodes:   No palpable adenopathy     I personally viewed and interpreted the patient's EKG/Telemetry " data    Assessment:  Active Hospital Problems    Diagnosis  POA   • Coronary artery calcification [I25.10, I25.84]  Unknown   • Dyspnea on exertion [R06.09]  Unknown   • Type 2 diabetes mellitus without complication, with long-term current use of insulin [E11.9, Z79.4]  Not Applicable   • Mixed hyperlipidemia [E78.2]  Unknown      Resolved Hospital Problems   No resolved problems to display.       Plan: He has a calcium score of 700 diabetes hypertension is referred for left heart cath.  H&P reviewed. The patient was examined and there are no changes to the H&P. I have explained the risks and benefits of the procedure to the patient.  The patient understands and agrees to proceed

## 2023-05-26 ENCOUNTER — TELEPHONE (OUTPATIENT)
Dept: CARDIOLOGY | Facility: CLINIC | Age: 73
End: 2023-05-26
Payer: MEDICARE

## 2023-05-26 NOTE — TELEPHONE ENCOUNTER
Mike Cox returned call.  Patient stated Dr. Preciado told him to f/u in 1 week.      Patient agreeable to see Donna at [facility] office on 5/30 at 11am.  Provided patient with office address and he verbalized understanding with repeat back of address.    Thank you,  Karina GARCIA RN  Triage Nurse [facility] [place]   14:57 EDT

## 2023-05-26 NOTE — TELEPHONE ENCOUNTER
I tried to call Mike Cox but there was no answer.  Left a voicemail asking patient to call back.  Will continue to try to reach pt.    Thank you,    Andree Valera RN  Triage [facility] [place]  05/26/23 13:18 EDT

## 2023-05-30 ENCOUNTER — OFFICE VISIT (OUTPATIENT)
Dept: CARDIOLOGY | Facility: CLINIC | Age: 73
End: 2023-05-30

## 2023-05-30 VITALS
BODY MASS INDEX: 27.09 KG/M2 | SYSTOLIC BLOOD PRESSURE: 136 MMHG | DIASTOLIC BLOOD PRESSURE: 98 MMHG | HEART RATE: 94 BPM | WEIGHT: 200 LBS | HEIGHT: 72 IN | OXYGEN SATURATION: 97 %

## 2023-05-30 DIAGNOSIS — I25.10 CORONARY ARTERY DISEASE INVOLVING NATIVE CORONARY ARTERY OF NATIVE HEART WITHOUT ANGINA PECTORIS: Primary | ICD-10-CM

## 2023-05-30 DIAGNOSIS — R06.09 DYSPNEA ON EXERTION: ICD-10-CM

## 2023-05-30 DIAGNOSIS — E78.2 MIXED HYPERLIPIDEMIA: ICD-10-CM

## 2023-05-30 DIAGNOSIS — I44.4 LAFB (LEFT ANTERIOR FASCICULAR BLOCK): ICD-10-CM

## 2023-05-30 DIAGNOSIS — I10 ESSENTIAL HYPERTENSION: ICD-10-CM

## 2023-05-30 RX ORDER — ROSUVASTATIN CALCIUM 40 MG/1
40 TABLET, COATED ORAL DAILY
Qty: 90 TABLET | Refills: 3 | Status: SHIPPED | OUTPATIENT
Start: 2023-05-30 | End: 2023-05-30

## 2023-05-30 RX ORDER — PANTOPRAZOLE SODIUM 40 MG/1
40 TABLET, DELAYED RELEASE ORAL
COMMUNITY
Start: 2023-05-11

## 2023-05-30 RX ORDER — NEBIVOLOL 20 MG/1
20 TABLET ORAL DAILY
Qty: 90 TABLET | Refills: 3 | Status: SHIPPED | OUTPATIENT
Start: 2023-05-30

## 2023-05-30 RX ORDER — ROSUVASTATIN CALCIUM 40 MG/1
40 TABLET, COATED ORAL DAILY
Qty: 90 TABLET | Refills: 3 | Status: SHIPPED | OUTPATIENT
Start: 2023-05-30

## 2023-05-30 RX ORDER — ROSUVASTATIN CALCIUM 40 MG/1
40 TABLET, COATED ORAL DAILY
Qty: 90 TABLET | Refills: 3 | Status: SHIPPED | OUTPATIENT
Start: 2023-05-30 | End: 2023-05-30 | Stop reason: SDUPTHER

## 2023-05-30 NOTE — PROGRESS NOTES
"      [facility] CARDIOLOGY  [address]  [facility] [address]  Phone: [phone]  Fax: [phone]  Patient Name: Mike Cox  :1950  Age: 72 y.o.  Primary Cardiologist: Isabelle Flores MD  Encounter Provider:  TOOTIE Daley    History of Present Illness     Mike Cox is a 72 y.o.  male whose medical history includes hypertension, hyperlipidemia, and type 2 diabetes.  He is followed in our office by Dr. Flores for cardiovascular risk reduction. I have reviewed the past medical records in preparation of today's visit.     23 Follow-up:  He is here for post-cath follow-up.  He had an elevated CT calcium score and elected to have cardiac catheterization to evaluate his dyspnea with exertion.  It showed nonobstructive disease of the LAD, RCA, and a possible obstruction of the first diagonal branch which is not amenable to PCI.  He is not checking his blood pressure at home but it is elevated in office today.  He is also having headache.  His dyspnea he feels is stable.  He is exercising several times a week and the dyspnea is not affecting his ability to exercise.  He is not on new medications but he is having dental work lately.  He denies chest pain, palpitations, or lightheadedness.  He is taking his medications as prescribed.    Data Review     The following data was reviewed by TOOTIE Daley on 23:    Vital Signs:   /98   Pulse 94   Ht 182.9 cm (72\")   Wt 90.7 kg (200 lb)   SpO2 97%   BMI 27.12 kg/m²       Weight:  Wt Readings from Last 3 Encounters:   23 90.7 kg (200 lb)   23 87.1 kg (192 lb)   23 88 kg (194 lb 0.1 oz)     Body mass index is 27.12 kg/m².    Below is a summary of pertinent cardiology findings:  •  he had an exercise nuclear stress test given his cardiac risk profile which was negative for ischemia.  He was also having palpitations; these " were treated and improved.  • January 2008 Holter monitor showed PVCs.  Stress nuclear perfusion study for fatigue showed no evidence of ischemia.  Echocardiogram showed EF 65%, grade 1 diastolic dysfunction, and no significant valvular disease.  • December 2009 vascular screening was normal.  • May 2011 carotid artery duplex study showed moderate intimal thickening of the right common carotid artery with mild thickening of the left carotid artery bulbs with no evidence of stenosis.  • April 2014 echocardiogram showed EF 54%, grade 1 diastolic dysfunction.  • January 2015 stress nuclear perfusion study showed no evidence of ischemia and EF 57%.  • April 2015 carotid artery duplex study showed moderate intimal thickening of the right internal carotid artery, and moderate plaque in the left internal carotid artery.  • February 2018 stress echocardiogram showed baseline EF 59% with post exercise EF 68% with septal hypokinesis which may be secondary to right bundle branch block.  There was also noted to be grade 1 diastolic dysfunction and no significant valvular disease.  This was done for dyspnea with exertion so he had cardiac catheterization.  May 2018 cardiac catheterization showed normal left main, large dominant normal left circumflex, small nondominant RCA, 10 to 20% proximal LAD stenosis with normal mid and distal LAD stenosis with 30 to 40% first diagonal stenosis; there was normal LV SF.  • August 2020 vascular screening showed bilateral carotid artery plaquing but no stenosis and otherwise normal vascular screening.  • March 2021 echocardiogram showed EF 55%, normal saline contrast study, normal diastolic function, and no significant valvular disease.  • September 2021 treadmill myocardial perfusion stress study showed no evidence of ischemia, and EF 61%.  • May 2023 echocardiogram shows EF 55.6%, and normal LV diastolic function.  • May 2023 vascular screening showed mild bilateral stenosis of the internal  carotid arteries but normal diameter of abdominal aortic aneurysm, and normal BLE ABIs.  • May 2023 CT calcium score was 772.4 Agatston units with 76.4 in the left main coronary artery, 516.9 in the LAD, 178.4 in the left circumflex, 0.8 in the RCA.  • May 2023 he elected to have cardiac catheterization due to continued dyspnea with exertion.  It showed normal left main coronary artery, diffuse 20% disease in the proximal and mid LAD, moderate size first diagonal branch, possible narrowing of the first diagonal versus artifact.  Diffuse 20% disease of the left circumflex, the OM1 was free of disease, the RCA is small and nondominant with no obstructive disease.  He is treated medically for possible diagonal disease which is not amenable to PCI.    Labs:  • 04/11/2023:  cr 1.0, K 4.4, , otherwise unremarkable CMP, HgbA1c 7.5  • 05/22/2023: Hgb 16.6,       ECG 12 Lead    Date/Time: 5/30/2023 11:19 AM  Performed by: Anna Persaud APRN  Authorized by: Anna Persaud APRN   Comparison: compared with previous ECG from 4/20/2023  Similar to previous ECG  Rhythm: sinus rhythm  Rate: normal  BPM: 82  Conduction: left anterior fascicular block  T flattening: aVL    Clinical impression: abnormal EKG            Medications     Allergies as of 05/30/2023   • (No Known Allergies)         Current Outpatient Medications:   •  ACCU-CHEK FASTCLIX LANCETS misc, TEST TID, Disp: , Rfl: 5  •  ACCU-CHEK SMARTVIEW test strip, USE TO TEST BLOOD SUGAR BID, Disp: , Rfl: 6  •  aspirin 81 MG EC tablet, Take 1 tablet by mouth Daily., Disp: 30 tablet, Rfl: 11  •  BD Pen Needle Lien 2nd Gen 32G X 4 MM misc, See Admin Instructions., Disp: , Rfl:   •  buPROPion XL (WELLBUTRIN XL) 150 MG 24 hr tablet, Take 1 tablet by mouth Daily., Disp: , Rfl:   •  glimepiride (AMARYL) 4 MG tablet, Take 1 tablet by mouth 2 (Two) Times a Day., Disp: , Rfl: 5  •  insulin glargine (LANTUS, SEMGLEE) 100 UNIT/ML injection, Inject  50 Units under the skin into the appropriate area as directed Daily., Disp: , Rfl:   •  Jardiance 25 MG tablet tablet, Take 1 tablet by mouth Every Morning., Disp: , Rfl:   •  lisinopril (PRINIVIL,ZESTRIL) 20 MG tablet, TAKE 1 TABLET BY MOUTH EVERY DAY, Disp: 90 tablet, Rfl: 0  •  modafinil (PROVIGIL) 100 MG tablet, Take 1 tablet by mouth Daily., Disp: , Rfl:   •  nebivolol (BYSTOLIC) 20 MG tablet, Take 1 tablet by mouth Daily., Disp: 90 tablet, Rfl: 3  •  pantoprazole (PROTONIX) 40 MG EC tablet, 1 tablet., Disp: , Rfl:   •  rosuvastatin (CRESTOR) 40 MG tablet, Take 1 tablet by mouth Daily., Disp: 90 tablet, Rfl: 3  •  SEMAGLUTIDE,0.25 OR 0.5MG/DOS, SC, 1 (One) Time Per Week. MONDAY, Disp: , Rfl:   •  tamsulosin (FLOMAX) 0.4 MG capsule 24 hr capsule, Take 2 capsules by mouth Daily., Disp: , Rfl:      Past History, Review of Systems, Exam     Past Medical History:   Diagnosis Date   • Achilles tendinitis of both lower extremities 06/2018   • Actinic keratoses 01/2020   • Alcohol abuse 1987   • Allergic rhinitis    • Arteriosclerotic vascular disease    • Asthma    • BBB (bundle branch block)    • BCC (basal cell carcinoma) 09/2020   • Benign paroxysmal positional vertigo of right ear 03/10/2020   • BPH (benign prostatic hyperplasia)    • Carotid artery disease    • Cataract    • Cellulitis of right foot 03/24/2018   • Cellulitis of right upper arm 04/15/2018   • Cervical radiculopathy 09/2020   • Chondromalacia, knee, right 01/2020   • Colon polyp    • Complex tear of medial meniscus of right knee 12/2019   • Constipation    • COPD (chronic obstructive pulmonary disease)    • COVID-19 virus infection 08/04/2020   • DDD (degenerative disc disease), cervical    • DDD (degenerative disc disease), thoracic    • Depression    • Diabetes mellitus 2002   • Diarrhea    • Diastasis of rectus abdominis 01/2021   • Diastolic dysfunction    • Dislocated temporomandibular joint 08/2021   • Dizziness 02/16/2021   • Dry eye  syndrome of both eyes    • Dyspnea on exertion 04/2018   • Elevated LFTs 08/2018   • Exostosis of left foot 12/2018   • Fecal smearing 02/2018   • Gout 05/2019   • Heart murmur    • Hemorrhoids    • Hepatitis C    • HSV-2 infection    • Hyperkalemia 05/2020   • Hyperlipidemia    • Hypersomnia 10/2020   • Hypertension    • Hyperuricemia 05/2020   • Hyponatremia 05/2020   • LAFB (left anterior fascicular block) 02/2021   • Lichen simplex chronicus 04/2020   • Memory difficulty 02/2020   • Mild cognitive impairment    • Neutrophilia 08/2020   • Nocturia 04/2018   • Nocturnal hypoxemia    • Non-bullous impetigo 01/2019   • NSAID long-term use    • PAULETTE (obstructive sleep apnea)    • Osteophyte 10/2018   • Other synovitis and tenosynovitis, left ankle and foot 08/2018   • Peroneal tendonitis 05/2018   • Plantar fascial fibromatosis 05/2018   • Polyneuropathy    • Pulmonary fibrosis    • Purpura 11/02/2019   • PVC (premature ventricular contraction)    • Rheumatic fever    • Seborrheic keratoses    • Segmental and somatic dysfunction of cervical region    • Segmental and somatic dysfunction of lumbar region    • Segmental and somatic dysfunction of sacral region    • Segmental and somatic dysfunction of thoracic region    • Sensorineural hearing loss (SNHL) of both ears    • Sleep related bruxism 08/2021   • Strain of coccyx 11/2013   • Substance abuse    • Synovial cyst, popliteal, right 12/28/2019   • Tachycardia 02/2021   • Tattoo reaction 04/16/2018   • Urge incontinence 03/2022   • Vitamin B 12 deficiency    • Vitamin D deficiency    • Vitreous degeneration of both eyes    • Vitreous opacities of right eye        Past Surgical History:   has a past surgical history that includes Shoulder surgery (Bilateral, 1995); Cystoscopy (N/A); Tennis Elbow Release (Right, 07/18/2017); Colonoscopy (N/A, 09/10/2013); Colonoscopy (N/A, 11/17/2006); Colonoscopy (N/A, 08/25/2009); Cyst Removal (N/A, 06/22/2015); Colonoscopy (N/A,  2018); Cardiac catheterization (N/A, 2018); Cardiac catheterization (N/A, 2018); Ankle ligament reconstruction (Left, 06/10/2019); Knee surgery (Right); Skin biopsy (Right, 2020); Skin biopsy (Bilateral, 2018); Colonoscopy (N/A, 09/15/2022); Cardiac catheterization (N/A, 2023); Cardiac catheterization (N/A, 2023); and Cardiac catheterization (N/A, 2023).     Social History     Socioeconomic History   • Marital status:      Spouse name: Lucy Cox   Tobacco Use   • Smoking status: Former     Packs/day: 1.00     Years: 20.00     Pack years: 20.00     Types: Cigarettes     Start date: 1966     Quit date: 1986     Years since quittin.4   • Smokeless tobacco: Never   • Tobacco comments:     Caffeine use 2 cups daily   Vaping Use   • Vaping Use: Never used   Substance and Sexual Activity   • Alcohol use: No     Comment: Quit    • Drug use: No     Comment: sober since 35 h/o iv drug use   • Sexual activity: Not Currently     Partners: Female     Birth control/protection: None       Review of Systems   Cardiovascular: Positive for dyspnea on exertion. Negative for chest pain, claudication, cyanosis, irregular heartbeat, leg swelling, near-syncope, orthopnea, palpitations, paroxysmal nocturnal dyspnea and syncope.   Neurological: Positive for headaches. Negative for light-headedness.       Vitals reviewed.   Constitutional:       Appearance: Not in distress.   Eyes:      Conjunctiva/sclera: Conjunctivae normal.      Pupils: Pupils are equal, round, and reactive to light.   HENT:      Head: Normocephalic.      Nose: Nose normal.    Mouth/Throat:      Pharynx: Oropharynx is clear.   Neck:      Vascular: JVD normal.   Pulmonary:      Effort: Pulmonary effort is normal.      Breath sounds: Normal breath sounds. No wheezing. No rhonchi. No rales.   Cardiovascular:      Normal rate. Regular rhythm. Normal S1. Normal S2.      Murmurs: There is no murmur.    Pulses:     Intact distal pulses.   Edema:     Peripheral edema absent.   Abdominal:      General: Bowel sounds are normal. There is no distension.      Palpations: Abdomen is soft.      Tenderness: There is no abdominal tenderness.   Musculoskeletal: Normal range of motion.      Cervical back: Normal range of motion and neck supple. Skin:     General: Skin is warm and dry.   Neurological:      Mental Status: Alert and oriented to person, place and time.   Psychiatric:         Attention and Perception: Attention normal.         Mood and Affect: Mood normal.         Speech: Speech normal.         Behavior: Behavior is cooperative.          Assessment and Plan     Assessment:  1. Coronary artery disease involving native coronary artery of native heart without angina pectoris    2. Dyspnea on exertion    3. LAFB (left anterior fascicular block)    4. Essential hypertension    5. Mixed hyperlipidemia         1. Coronary artery disease: He had an elevated CT calcium score in May 2023 and then had follow-up cardiac catheterization which showed nonobstructive disease of the LAD and RCA; there was also a possible 70% stenosis of an OM branch which is too small for PCI.  Medical management is recommended.  He is on nebivolol, atorvastatin, and aspirin.  2. Dyspnea with exertion: He has noted dyspnea when going up stairs but feels fine walking distances and riding his recumbent bike.  This is stable.  3. Left anterior fascicular block.  This is chronic and stable.  Nonobstructive coronary artery disease noted on cardiac catheterization May 2023.  4. Hypertension: Recheck of BP in office today is 160/100 in both right and left arms.  5. Hyperlipidemia: No recent lipids to review but he does have coronary artery calcification on 80 mg Lipitor daily.  6. Type 2 diabetes: He is on 25 mg Jardiance daily and semaglutide.    Mr. Cox is a patient of Dr. Flores with hypertension and type 2 diabetes.  He was also noted to have  elevated CT calcium score May 2023 and follow-up cardiac catheterization in May 2023 showed nonobstructive disease of the LAD, RCA, and disease of a small first diagonal branch which was not amenable to PCI.  His blood pressure is elevated today; I am going to increase his nebivolol to 20 mg daily.  I am also going to stop his atorvastatin and start 40 mg rosuvastatin for better risk factor modification; may consider injectable therapy at a later time.  I will see him back in 1 month to reevaluate his blood pressure.    Return in about 4 weeks (around 6/27/2023) for Follow-up with TOOTIE Tom.  Orders Placed This Encounter   Procedures   • ECG 12 Lead      New Medications Ordered This Visit   Medications   • nebivolol (BYSTOLIC) 20 MG tablet     Sig: Take 1 tablet by mouth Daily.     Dispense:  90 tablet     Refill:  3   • rosuvastatin (CRESTOR) 40 MG tablet     Sig: Take 1 tablet by mouth Daily.     Dispense:  90 tablet     Refill:  3         Thank you for the opportunity to participate in this patient's care.    TOOTIE Tom    This office note has been dictated.

## 2023-07-27 ENCOUNTER — TELEPHONE (OUTPATIENT)
Dept: CARDIOLOGY | Facility: CLINIC | Age: 73
End: 2023-07-27

## 2023-07-27 ENCOUNTER — HOSPITAL ENCOUNTER (OUTPATIENT)
Dept: CARDIOLOGY | Facility: HOSPITAL | Age: 73
Discharge: HOME | End: 2023-07-27
Admitting: INTERNAL MEDICINE
Payer: MEDICARE

## 2023-07-27 DIAGNOSIS — I25.10 CORONARY ARTERY DISEASE INVOLVING NATIVE CORONARY ARTERY OF NATIVE HEART WITHOUT ANGINA PECTORIS: ICD-10-CM

## 2023-07-27 LAB
BH CV STRESS BP STAGE 1: NORMAL
BH CV STRESS BP STAGE 2: NORMAL
BH CV STRESS BP STAGE 3: NORMAL
BH CV STRESS DURATION MIN STAGE 1: 3
BH CV STRESS DURATION MIN STAGE 2: 3
BH CV STRESS DURATION MIN STAGE 3: 1
BH CV STRESS DURATION SEC STAGE 1: 0
BH CV STRESS DURATION SEC STAGE 2: 0
BH CV STRESS DURATION SEC STAGE 3: 30
BH CV STRESS GRADE STAGE 1: 10
BH CV STRESS GRADE STAGE 2: 12
BH CV STRESS GRADE STAGE 3: 14
BH CV STRESS HR STAGE 1: 109
BH CV STRESS HR STAGE 2: 124
BH CV STRESS HR STAGE 3: 129
BH CV STRESS METS STAGE 1: 5
BH CV STRESS METS STAGE 2: 7.5
BH CV STRESS METS STAGE 3: 9
BH CV STRESS PROTOCOL 1: NORMAL
BH CV STRESS RECOVERY BP: NORMAL MMHG
BH CV STRESS RECOVERY HR: 103 BPM
BH CV STRESS SPEED STAGE 1: 1.7
BH CV STRESS SPEED STAGE 2: 2.5
BH CV STRESS SPEED STAGE 3: 3.4
BH CV STRESS STAGE 1: 1
BH CV STRESS STAGE 2: 2
BH CV STRESS STAGE 3: 3
MAXIMAL PREDICTED HEART RATE: 147 BPM
PERCENT MAX PREDICTED HR: 87.76 %
STRESS BASELINE BP: NORMAL MMHG
STRESS BASELINE HR: 94 BPM
STRESS PERCENT HR: 103 %
STRESS POST ESTIMATED WORKLOAD: 9 METS
STRESS POST EXERCISE DUR MIN: 7 MIN
STRESS POST EXERCISE DUR SEC: 30 SEC
STRESS POST PEAK BP: NORMAL MMHG
STRESS POST PEAK HR: 129 BPM
STRESS TARGET HR: 125 BPM

## 2023-07-27 PROCEDURE — 93017 CV STRESS TEST TRACING ONLY: CPT

## 2023-07-27 NOTE — TELEPHONE ENCOUNTER
Notified patient of results/recommendations. Patient verbalized understanding.    Emmanuelle Kebede RN  Triage [facility] [place]

## 2023-07-27 NOTE — TELEPHONE ENCOUNTER
I tried to call Mike Cox but there was no answer.  Left a voicemail asking patient to call back.  Will continue to try to reach pt.    Thank you,    Andree Valera, RN  Triage [facility] [place]  07/27/23 15:23 EDT

## 2023-08-16 ENCOUNTER — TRANSCRIBE ORDERS (OUTPATIENT)
Dept: DIABETES SERVICES | Facility: HOSPITAL | Age: 73
End: 2023-08-16
Payer: MEDICARE

## 2023-08-16 DIAGNOSIS — E11.311: Primary | ICD-10-CM

## 2023-08-16 DIAGNOSIS — E11.65: Primary | ICD-10-CM

## 2023-08-22 ENCOUNTER — HOSPITAL ENCOUNTER (OUTPATIENT)
Dept: DIABETES SERVICES | Facility: HOSPITAL | Age: 73
Discharge: HOME | End: 2023-08-22
Admitting: INTERNAL MEDICINE
Payer: MEDICARE

## 2023-08-22 PROCEDURE — 97802 MEDICAL NUTRITION INDIV IN: CPT

## 2023-08-22 NOTE — CONSULTS
Mike was seen today by Registered Dietitian for Diabetes diet education. Consistent with the ADA's standards of care, a comprehensive assessment/training record has been sent to medical records (see media tab).    ADA goals reviewed. Checks blood sugar once daily. Is thinking about asking for a CGM. Had changes/issues with medications- states his blood sugar is back to where it needs to be. Discussed benefits of checking blood sugar before/after meals to problem solve. Carb counting/consistent carb diet reviewed- reference guide provided. Hypo/rule of 15 reviewed.    Mike has been encouraged to call our office with questions or additional education needs. Please place referral for additional services or follow-up should need arise.    Thank you for the referral.

## 2024-01-26 ENCOUNTER — OFFICE VISIT (OUTPATIENT)
Dept: CARDIOLOGY | Facility: CLINIC | Age: 74
End: 2024-01-26
Payer: MEDICARE

## 2024-01-26 VITALS
WEIGHT: 198 LBS | OXYGEN SATURATION: 95 % | HEART RATE: 83 BPM | BODY MASS INDEX: 26.82 KG/M2 | SYSTOLIC BLOOD PRESSURE: 132 MMHG | HEIGHT: 72 IN | DIASTOLIC BLOOD PRESSURE: 86 MMHG

## 2024-01-26 DIAGNOSIS — I44.4 LAFB (LEFT ANTERIOR FASCICULAR BLOCK): ICD-10-CM

## 2024-01-26 DIAGNOSIS — I10 ESSENTIAL HYPERTENSION: ICD-10-CM

## 2024-01-26 DIAGNOSIS — I25.10 CORONARY ARTERY DISEASE INVOLVING NATIVE CORONARY ARTERY OF NATIVE HEART WITHOUT ANGINA PECTORIS: Primary | ICD-10-CM

## 2024-01-26 DIAGNOSIS — E78.2 MIXED HYPERLIPIDEMIA: ICD-10-CM

## 2024-01-26 PROCEDURE — 3075F SYST BP GE 130 - 139MM HG: CPT | Performed by: INTERNAL MEDICINE

## 2024-01-26 PROCEDURE — 99214 OFFICE O/P EST MOD 30 MIN: CPT | Performed by: INTERNAL MEDICINE

## 2024-01-26 PROCEDURE — 1160F RVW MEDS BY RX/DR IN RCRD: CPT | Performed by: INTERNAL MEDICINE

## 2024-01-26 PROCEDURE — 93000 ELECTROCARDIOGRAM COMPLETE: CPT | Performed by: INTERNAL MEDICINE

## 2024-01-26 PROCEDURE — 3079F DIAST BP 80-89 MM HG: CPT | Performed by: INTERNAL MEDICINE

## 2024-01-26 PROCEDURE — 1159F MED LIST DOCD IN RCRD: CPT | Performed by: INTERNAL MEDICINE

## 2024-01-26 RX ORDER — ROSUVASTATIN CALCIUM 40 MG/1
40 TABLET, COATED ORAL NIGHTLY
Qty: 90 TABLET | Refills: 3 | Status: SHIPPED | OUTPATIENT
Start: 2024-01-26

## 2024-01-26 RX ORDER — NEBIVOLOL 20 MG/1
20 TABLET ORAL NIGHTLY
Qty: 90 TABLET | Refills: 3 | Status: SHIPPED | OUTPATIENT
Start: 2024-01-26

## 2024-01-26 NOTE — PROGRESS NOTES
Date of Office Visit: 2023  Encounter Provider: Isabelle Flores MD  Place of Service: [facility] CARDIOLOGY  Patient Name: Mike Cox  :1950      Patient ID:  Mike Cox is a 73 y.o. male is here for  followup for coronary calcification, cardiac risks.        History of Present Illness    He has hypertension, hyperlipidemia, diabetes mellitus type 2, and obstructive sleep  apnea. He first presented to the office for chest pain. Because of his cardiovascular  risks, he had an exercise nuclear stress study done in  which was negative for  ischemia. He had a lot of fatigue at that time and palpitations which were treated and  got better. His Holter recording done in 2008 showed premature ventricular complexes.   He had vascular screening done on 2009 which was normal. He had a cardiac duplex   study performed on 2011 which showed moderate intimal thickening of the right   common carotid artery with mild thickening of that carotid bulb but no thickening of his stenosis.   He presented in 2008 with fatigue and had a stress nuclear perfusion study showing no evidence   of ischemia. His echocardiogram then showed an ejection fraction of 65% with grade I diastolic dysfunction  and no significant valvular heart problems.      He had a stress nuclear perfusion study done on 2015,  showing no evidence of ischemia and ejection fraction of 57%. He had a 2-D echocardiogram  with Doppler done on 2014, showing an ejection fraction of 54% and grade 1 diastolic  dysfunction. He had carotid duplex studies done on 2015, which showed moderate  intimal thickening of the right internal carotid artery and moderate plaque in the left  internal carotid artery.      He had a stress echocardiogram done 18 showing baseline ejection fraction 59% with postexercise ejection fraction of 68% with septal hypokinesis which may be secondary to  bundle branch block.  There was noted to be grade 1 diastolic dysfunction and no valvular disease. This was done for dyspnea on exertion.  cardiac catheterization done 5/2/18 which showed normal left main, large dominant normal circumflex, small nondominant RCA, 10-20% proximal LAD stenosis with normal mid and distal LAD.  30-40% first diagonal stenosis.  Normal left ventricular systolic function.     Vascular screening done 8/17/2020 showed bilateral carotid plaquing, no stenosis, otherwise normal vascular screening.  Echo done 3/8/2021 showed normal ejection fraction 55% normal saline contrast study, normal diastolic function no significant valvular disease.     Labs done 8/2022 show glucose 130, otherwise normal CMP, normal TSH, hemoglobin A1c 8.2%.  He had a nonischemic stress nuclear perfusion study done 9/16/2022.  Echocardiogram done 5/4/2023 was normal with ejection fraction of 56%.  Vascular screening done 5/12/2023 was normal.  Calcium score done 5/12/2023 was 772 with 516 in the LAD, 178 circumflex, 76 left main.  I did review the images and the calcification is in the proximal LAD.  Cardiac catheterization done 5/25/2023 showed normal left main, 20% diffuse LAD stenosis, 70% moderate sized diagonal stenosis, 20% circumflex stenosis, small nondominant RCA with no disease, no ramus intermedius.  Medical management was recommended.Treadmill stress study done 7/27/2023 showed no evidence of ischemia.    Has no chest pain or pressure.  He has no difficulty breathing.  He does not feels heart racing or skipping.  He had an episode of dizziness, possible near syncope when he got the car to pump gas.'s only happened once.  He had been sitting for some time.  It has not happened again.  At that time, he did not have symptoms of URI.  He has had thrush and that was treated.  He has no orthopnea or PND.    Past Medical History:   Diagnosis Date    Abnormal ECG     Achilles tendinitis of both lower extremities  06/2018    Actinic keratoses 01/2020    FOLLOWED BY DR. NATHALY LINDSEY    Alcohol abuse 1987    H/O ALCOHOL ABUSE, SOBER FOR MANY YEARS    Allergic rhinitis     Arteriosclerotic vascular disease     FOLLOWED BY DR. JOSE CHANG    Asthma     MODERATE, PERSISTANT, FOLLOWED BY DR. PATRICIA GOLDSMITH    BBB (bundle branch block)     LEFT POSTERIOR BUNDLE BRANCH HEMIBLOCK; patient denies    BCC (basal cell carcinoma) 09/2020    RIGHT EAR    Benign paroxysmal positional vertigo of right ear 03/10/2020    SEEN AT     BPH (benign prostatic hyperplasia)     FOLLOWED BY DR. BETSEY FAY    Carotid artery disease     RIGHT    Cataract     BILATERAL    Cellulitis of right foot 03/24/2018    SEEN AT     Cellulitis of right upper arm 04/15/2018    SEEN AT     Cervical radiculopathy 09/2020    Chondromalacia, knee, right 01/2020    Colon polyp     FOLLOWED BY DR. LILIBETH AMADOR    Complex tear of medial meniscus of right knee 12/2019    Constipation     COPD (chronic obstructive pulmonary disease)     Coronary artery disease     COVID-19 virus infection 08/04/2020    SEEN AT     DDD (degenerative disc disease), cervical     DDD (degenerative disc disease), thoracic     Depression     Diabetes mellitus 2002    TYPE 2, IDDM, FOLLOWED BY DR. KAMILA MURCIA    Diarrhea     Diastasis of rectus abdominis 01/2021    Diastolic dysfunction     Dislocated temporomandibular joint 08/2021    Dizziness 02/16/2021    SEEN AT [facility]    Dry eye syndrome of both eyes     Dyspnea on exertion 04/2018    with fatigue    Elevated LFTs 08/2018    Exostosis of left foot 12/2018    Fecal smearing 02/2018    Gout 05/2019    Heart murmur     Hemorrhoids     Hepatitis C     In remission after Interferon and Ribavirin treatment    HSV-2 infection     Hyperkalemia 05/2020    Hyperlipidemia     Hypersomnia 10/2020    Hypertension     Hyperuricemia 05/2020    Hyponatremia 05/2020    LAFB (left anterior fascicular block) 02/2021    Lichen simplex  chronicus 04/2020    Memory difficulty 02/2020    Mild cognitive impairment     Neutrophilia 08/2020    Nocturia 04/2018    Nocturnal hypoxemia     FOLLOWED BY DR. PATRICIA GOLDSMITH    Non-bullous impetigo 01/2019    NSAID long-term use     PAULETTE (obstructive sleep apnea)     USES BIPAP, 2018 resolved, no longer uses cpap    Osteophyte 10/2018    FOLLOWED BY DR. ENRIKE COSTA    Other synovitis and tenosynovitis, left ankle and foot 08/2018    Peroneal tendonitis 05/2018    BILATERAL    Plantar fascial fibromatosis 05/2018    Polyneuropathy     Pulmonary fibrosis     Purpura 11/02/2019    SEEN AT     PVC (premature ventricular contraction)     Rheumatic fever     IN CHILDHOOD    Seborrheic keratoses     FOLLOWED BY DR. NATHALY LINDSEY    Segmental and somatic dysfunction of cervical region     Segmental and somatic dysfunction of lumbar region     Segmental and somatic dysfunction of sacral region     Segmental and somatic dysfunction of thoracic region     Sensorineural hearing loss (SNHL) of both ears     WEARS HEARING AIDS, FOLLOWED BY DR. JAE DOLL    Sleep related bruxism 08/2021    Strain of coccyx 11/2013    Substance abuse     H/O DRUG ABUSE, NOW SOBER( IV DRUG USE)    Synovial cyst, popliteal, right 12/28/2019    SEEN AT [place] [facility]    Tachycardia 02/2021    Tattoo reaction 04/16/2018    BACTERIAL INFECTION, SEEN AT [facility]    Urge incontinence 03/2022    Vitamin B 12 deficiency     Vitamin D deficiency     Vitreous degeneration of both eyes     Vitreous opacities of right eye          Past Surgical History:   Procedure Laterality Date    ANKLE LIGAMENT RECONSTRUCTION Left 06/10/2019    Procedure: PERONEAL TENDON RECONSTRUCTION AND LEFT 5TH METATARSAL OSTECTOMY;  Surgeon: Enrike Costa MD;  Location: [facility] OR [facility] [place];  Service: Orthopedics; SURGERY ON SIDE OF FOOT, NOT ANKLE    CARDIAC CATHETERIZATION N/A 05/02/2018    Procedure: Coronary angiography;  Surgeon: Maik Preciado MD;  Location: [facility] CATH INVASIVE  LOCATION;  Service: Cardiovascular    CARDIAC CATHETERIZATION N/A 05/02/2018    Procedure: Left ventriculography;  Surgeon: Maik Preciado MD;  Location:  [facility] CATH INVASIVE LOCATION;  Service: Cardiovascular    CARDIAC CATHETERIZATION N/A 05/25/2023    Procedure: Coronary angiography;  Surgeon: Maik Preciado MD;  Location:  [facility] CATH INVASIVE LOCATION;  Service: Cardiology;  Laterality: N/A;    CARDIAC CATHETERIZATION N/A 05/25/2023    Procedure: Left Heart Cath;  Surgeon: Maik Preciado MD;  Location:  [facility] CATH INVASIVE LOCATION;  Service: Cardiology;  Laterality: N/A;    CARDIAC CATHETERIZATION N/A 05/25/2023    Procedure: Left ventriculography;  Surgeon: Maik Preciado MD;  [facility];  Service: Cardiology;  Laterality: N/A;    COLONOSCOPY N/A 09/10/2013    Normal, repeat in 10 years-Dr. Svetlana Ornelas    COLONOSCOPY N/A 11/17/2006    Normal, repeat in 5 years-Dr. Jackson Denise    COLONOSCOPY N/A 08/25/2009    Normal, repeat in 5 years-Dr. Jackson Denise    COLONOSCOPY N/A 04/02/2018    MILD SIGMOID DIVERTICULOSIS, RESCOPE IN 10 YRS, DR. SVETLANA ORNELAS AT [facility]    COLONOSCOPY N/A 09/15/2022    6 MM TUBULAR ADENOMA POLYP IN DESCENDING, 4 MM BENIGN POLYP IN RECTUM, MULTIPLE DIVERTICULA IN SIGMOID, RESCOPE IN 5 YRS, DR. LILIBETH AMADOR AT [facility]    CYST REMOVAL N/A 06/22/2015    ON FOREHEAD, EPIDERMOID/SEBACEOUS, DR. SAHIL PFEIFFER    CYSTOSCOPY N/A     KNEE SURGERY Right     cortisone injections and knee scope, Dr. Steinberg    SHOULDER SURGERY Bilateral 1995    BONE SPURS, DR. TYRA HESS    SKIN BIOPSY Right 09/23/2020    RIGHT SCAPHA, (+) NODULAR BCC, DR. NATHALY LINDSEY    SKIN BIOPSY Bilateral 05/31/2018    LEFT CENTRAL PARIETAL SCALP, RIGHT MEDIAL FRONTAL SCALP, AND LEFT NASAL SIDEWALL, DR. NATHALY LINDSEY    TENNIS ELBOW RELEASE Right 07/18/2017    Procedure: RIGHT ELBOW OPEN FLEXOR TENDON DEBRIDEMENT AND REPAIR;  Surgeon: FABIO Hess MD;  Location:  [facility] OR [facility] [place];  Service:         Current Outpatient Medications on File Prior to Visit   Medication Sig Dispense Refill    ACCU-CHEK FASTCLIX LANCETS misc TEST TID  5    ACCU-CHEK SMARTVIEW test strip USE TO TEST BLOOD SUGAR BID  6    aspirin 81 MG EC tablet Take 1 tablet by mouth Daily. 30 tablet 11    BD Pen Needle Lien 2nd Gen 32G X 4 MM misc See Admin Instructions.      buPROPion XL (WELLBUTRIN XL) 150 MG 24 hr tablet Take 1 tablet by mouth Daily.      glimepiride (AMARYL) 4 MG tablet Take 1 tablet by mouth 2 (Two) Times a Day.  5    insulin glargine (LANTUS, SEMGLEE) 100 UNIT/ML injection Inject 50 Units under the skin into the appropriate area as directed Daily.      Jardiance 25 MG tablet tablet Take 1 tablet by mouth Every Morning.      lisinopril (PRINIVIL,ZESTRIL) 20 MG tablet TAKE 1 TABLET BY MOUTH EVERY DAY 90 tablet 0    modafinil (PROVIGIL) 100 MG tablet Take 1 tablet by mouth Daily.      nebivolol (BYSTOLIC) 20 MG tablet Take 1 tablet by mouth Daily. 90 tablet 3    pantoprazole (PROTONIX) 40 MG EC tablet 1 tablet.      SEMAGLUTIDE,0.25 OR 0.5MG/DOS, SC 1 (One) Time Per Week. MONDAY      tamsulosin (FLOMAX) 0.4 MG capsule 24 hr capsule Take 2 capsules by mouth Daily.      rosuvastatin (CRESTOR) 40 MG tablet Take 1 tablet by mouth Daily. (Patient not taking: Reported on 2024) 90 tablet 3     No current facility-administered medications on file prior to visit.       Social History     Socioeconomic History    Marital status:      Spouse name: Lucy Cox   Tobacco Use    Smoking status: Former     Packs/day: 1.00     Years: 15.00     Additional pack years: 0.00     Total pack years: 15.00     Types: Cigarettes     Start date: 1966     Quit date: 1986     Years since quittin.0     Passive exposure: Past    Smokeless tobacco: Never    Tobacco comments:     Caffeine use 2 cups daily   Vaping Use    Vaping Use: Never used   Substance and Sexual Activity    Alcohol use: Not Currently     Comment: Quit  "1986//    Drug use: Not Currently     Types: Amphetamines, Cocaine(coke), Hashish, LSD, Marijuana, Mescaline, Methamphetamines     Comment: sober since 35 h/o iv drug use    Sexual activity: Not Currently     Partners: Female     Birth control/protection: None           ROS    Procedures    ECG 12 Lead    Date/Time: 1/26/2024 11:46 AM  Performed by: Isabelle Flores MD    Authorized by: Isabelle Flores MD  Comparison: compared with previous ECG   Similar to previous ECG  Rhythm: sinus rhythm  Conduction: left anterior fascicular block    Clinical impression: abnormal EKG            Objective:      Vitals:    01/26/24 1123   BP: 132/86   BP Location: Left arm   Patient Position: Sitting   Cuff Size: Adult   Pulse: 83   SpO2: 95%   Weight: 89.8 kg (198 lb)   Height: 182.9 cm (72\")     Body mass index is 26.85 kg/m².    Vitals reviewed.   Constitutional:       General: Not in acute distress.     Appearance: Well-developed. Not diaphoretic.   Eyes:      General: No scleral icterus.     Conjunctiva/sclera: Conjunctivae normal.   HENT:      Head: Normocephalic and atraumatic.   Neck:      Thyroid: No thyromegaly.      Vascular: No carotid bruit or JVD.      Lymphadenopathy: No cervical adenopathy.   Pulmonary:      Effort: Pulmonary effort is normal. No respiratory distress.      Breath sounds: Normal breath sounds. No wheezing. No rhonchi. No rales.   Chest:      Chest wall: Not tender to palpatation.   Cardiovascular:      Normal rate. Regular rhythm.      Murmurs: There is no murmur.      No gallop.  No rub.   Pulses:     Intact distal pulses.      Carotid: 2+ bilaterally.     Radial: 2+ bilaterally.     Dorsalis pedis: 2+ bilaterally.     Posterior tibial: 2+ bilaterally.  Edema:     Peripheral edema absent.   Abdominal:      General: Bowel sounds are normal. There is no distension or abdominal bruit.      Palpations: Abdomen is soft. There is no abdominal mass.      Tenderness: There is no abdominal " tenderness.   Musculoskeletal:         General: No deformity.      Extremities: No clubbing present.     Cervical back: Neck supple. Skin:     General: Skin is warm and dry. There is no cyanosis.      Coloration: Skin is not pale.      Findings: No rash.   Neurological:      Mental Status: Alert and oriented to person, place, and time.      Cranial Nerves: No cranial nerve deficit.   Psychiatric:         Judgment: Judgment normal.       Lab Review:       Assessment:      Diagnosis Plan   1. Coronary artery disease involving native coronary artery of native heart without angina pectoris        2. LAFB (left anterior fascicular block)        3. Essential hypertension        4. Mixed hyperlipidemia          1.  Coronary artery calcification, nonischemic stress nuclear perfusion study done 12/2021.  Very mild CAD on cath 5//25/23, no angina.   2. Abnormal ECG. Left anterior fascicular block which is chronic.  3. Hypertension, goal <120/80.   4. Diabetes mellitus type 2, on insulin. Sees Dr. ADELIA Ireland.   5. Obstructive sleep apnea on CPAP.   6. Hyperlipidemia. On rosuvastatin.  7. Hepatitis C, stable.  8. History of rheumatoid fever, stable. No valvular heart problems.   9. History of gout.  10.  Mild bilateral carotid artery stenosis.     Plan:       See Katie in 6 months, see me in 1 year.  Change Bystolic to nightly and change Crestor to nightly dosing.  [facility]  Overall he is doing well.  He will likely need repeat vascular screening next year.  Advised cardiovascular exercise.

## 2024-05-13 RX ORDER — LISINOPRIL 20 MG/1
10 TABLET ORAL DAILY
Start: 2024-05-13 | End: 2024-05-13 | Stop reason: SDUPTHER

## 2024-05-13 RX ORDER — LISINOPRIL 10 MG/1
10 TABLET ORAL DAILY
Qty: 90 TABLET | Refills: 3
Start: 2024-05-13 | End: 2024-05-14 | Stop reason: SDUPTHER

## 2024-05-13 NOTE — TELEPHONE ENCOUNTER
Mike Cox called to report dizziness.    Patient stated that for the last few weeks he has been experiencing dizziness when bending over.  Today, patient reports he started getting dizzy when standing up from the chair.  Patient reports 1 episode today and stated that this is scaring him.    /83, HR 94 (sitting down)  BP 89/69,  (after standing up)    Cardiac Meds  Nebivolol 20 mg, 1 tablet every night  Aspirin 81 mg, daily  Lisinopril 20 mg, 1 tablet every morning    Please let me know how you would like to proceed.    Thank you,  Karina GARCIA RN  [place] Nurse [facility] [place]   14:05 EDT

## 2024-05-13 NOTE — TELEPHONE ENCOUNTER
Hold lisinopril one day, then resume at 10 mg daily. Move up his appointment with Katie to the next few weeks.     Thanks!  Donna Persaud, APRN

## 2024-05-13 NOTE — TELEPHONE ENCOUNTER
Reviewed recommendations with Mike Cox and the patient verbalized understanding of the recommendations, with repeat back.  Rescheduled appointment with Ktaie to 5/20.    Donna,    Patient asked if RX for lisinopril 10 mg tablets can be sent in to [facility].  Pended RX for signature.    Thank you,  Karina GARCIA RN  Triage Nurse [facility] [place]   15:47 EDT

## 2024-05-14 RX ORDER — LISINOPRIL 10 MG/1
10 TABLET ORAL DAILY
Qty: 90 TABLET | Refills: 3 | Status: SHIPPED | OUTPATIENT
Start: 2024-05-14

## 2024-05-14 RX ORDER — LISINOPRIL 10 MG/1
10 TABLET ORAL DAILY
OUTPATIENT
Start: 2024-05-14

## 2024-05-14 NOTE — TELEPHONE ENCOUNTER
Already refilled?   Ok I am  Starting pravastatin. repeat labs in 3months.     Return to clinic with appt in 3 months

## 2024-05-14 NOTE — TELEPHONE ENCOUNTER
"The RX that was entered says \"no print\" so I don't think it was sent to his pharmacy.    Please let me know how you would like to proceed.    Thank you,  Karina GARCIA RN  Triage Nurse [facility]   15:20 EDT    "

## 2024-05-20 ENCOUNTER — OFFICE VISIT (OUTPATIENT)
Dept: CARDIOLOGY | Facility: CLINIC | Age: 74
End: 2024-05-20
Payer: MEDICARE

## 2024-05-20 VITALS
OXYGEN SATURATION: 97 % | HEART RATE: 91 BPM | HEIGHT: 72 IN | BODY MASS INDEX: 27.06 KG/M2 | SYSTOLIC BLOOD PRESSURE: 138 MMHG | DIASTOLIC BLOOD PRESSURE: 100 MMHG | WEIGHT: 199.8 LBS

## 2024-05-20 DIAGNOSIS — R55 NEAR SYNCOPE: ICD-10-CM

## 2024-05-20 DIAGNOSIS — I25.10 CORONARY ARTERY DISEASE INVOLVING NATIVE CORONARY ARTERY OF NATIVE HEART WITHOUT ANGINA PECTORIS: ICD-10-CM

## 2024-05-20 DIAGNOSIS — I10 ESSENTIAL HYPERTENSION: ICD-10-CM

## 2024-05-20 DIAGNOSIS — R42 DIZZINESS: Primary | ICD-10-CM

## 2024-05-20 DIAGNOSIS — I44.4 LAFB (LEFT ANTERIOR FASCICULAR BLOCK): ICD-10-CM

## 2024-05-20 PROBLEM — I25.84 CORONARY ARTERY CALCIFICATION: Status: RESOLVED | Noted: 2023-05-19 | Resolved: 2024-05-20

## 2024-05-20 PROCEDURE — 93000 ELECTROCARDIOGRAM COMPLETE: CPT | Performed by: NURSE PRACTITIONER

## 2024-05-20 PROCEDURE — 1159F MED LIST DOCD IN RCRD: CPT | Performed by: NURSE PRACTITIONER

## 2024-05-20 PROCEDURE — 1160F RVW MEDS BY RX/DR IN RCRD: CPT | Performed by: NURSE PRACTITIONER

## 2024-05-20 PROCEDURE — 3075F SYST BP GE 130 - 139MM HG: CPT | Performed by: NURSE PRACTITIONER

## 2024-05-20 PROCEDURE — 99214 OFFICE O/P EST MOD 30 MIN: CPT | Performed by: NURSE PRACTITIONER

## 2024-05-20 PROCEDURE — 3080F DIAST BP >= 90 MM HG: CPT | Performed by: NURSE PRACTITIONER

## 2024-05-20 NOTE — PROGRESS NOTES
Date of Office Visit: 2024  Encounter Provider: TOOTIE Giraldo  Place of Service: [facility] CARDIOLOGY  Patient Name: Mike Cox  :1950  Primary Cardiologist: Dr. Isabelle Flores    Chief Complaint   Patient presents with    Dizziness    Hypertension   :     HPI: Mike Cox is a 73 y.o. male who presents today for cardiac follow-up visit.  He is a new patient to me and I reviewed her medical records.    He has been diagnosed with hypertension, hyperlipidemia, obstructive sleep apnea, type 2 diabetes mellitus, and asthma.     In , he was seen in the office because of increased cardiovascular risk and stress test was normal.  He has had numerous stress test over the years.  He has a known left bundle branch block.    In May 2023, CT calcium score was 772 (left main 76, , and left circumflex 178).  He then had a coronary angiography completed with the following results: Left main normal, 20% diffuse LAD stenosis, 70% moderate size diagonal stenosis, 20% circumflex stenosis, and small nondominant RCA with no disease.  Medical treatment recommended.  In 2023, treadmill stress test showed no ischemia.    He recently contacted our office stating that he had a dizzy episode while bending over and near syncope when to have a bowel movement.  His lisinopril was decreased from 20 to 10 mg daily.    He presents today for follow-up visit.  His blood pressure remains elevated.  He had the episode of dizziness while bending over in the near syncopal episode while straining on the toilet the same day last week.  He typically drinks a lot of water and is not sure if he was dehydrated that day.  He had a cold a couple months ago but denies any recent infection.  He denies any further dizziness, near-syncope, syncope, or chest pain or shortness of breath.  Blood pressure is now elevated.      Past Medical History:   Diagnosis Date    Abnormal ECG      Achilles tendinitis of both lower extremities 06/2018    Actinic keratoses 01/2020    FOLLOWED BY DR. NATHALY LINDSEY    Alcohol abuse 1987    H/O ALCOHOL ABUSE, SOBER FOR MANY YEARS    Allergic rhinitis     Arteriosclerotic vascular disease     FOLLOWED BY DR. JOSE CHANG    Asthma     MODERATE, PERSISTANT, FOLLOWED BY DR. PATRICIA GOLDSMITH    BBB (bundle branch block)     LEFT POSTERIOR BUNDLE BRANCH HEMIBLOCK; patient denies    BCC (basal cell carcinoma) 09/2020    RIGHT EAR    Benign paroxysmal positional vertigo of right ear 03/10/2020    SEEN AT     BPH (benign prostatic hyperplasia)     FOLLOWED BY DR. BETSEY FAY    Carotid artery disease     RIGHT    Cataract     BILATERAL    Cellulitis of right foot 03/24/2018    SEEN AT     Cellulitis of right upper arm 04/15/2018    SEEN AT     Cervical radiculopathy 09/2020    Chondromalacia, knee, right 01/2020    Colon polyp     FOLLOWED BY DR. LILIBETH AMADOR    Complex tear of medial meniscus of right knee 12/2019    Constipation     COPD (chronic obstructive pulmonary disease)     Coronary artery disease     COVID-19 virus infection 08/04/2020    SEEN AT     DDD (degenerative disc disease), cervical     DDD (degenerative disc disease), thoracic     Depression     Diabetes mellitus 2002    TYPE 2, IDDM, FOLLOWED BY DR. KAMILA MURCIA    Diarrhea     Diastasis of rectus abdominis 01/2021    Diastolic dysfunction     Dislocated temporomandibular joint 08/2021    Dizziness 02/16/2021    SEEN AT [facility]    Dry eye syndrome of both eyes     Dyspnea on exertion 04/2018    with fatigue    Elevated LFTs 08/2018    Exostosis of left foot 12/2018    Fecal smearing 02/2018    Gout 05/2019    Heart murmur     Hemorrhoids     Hepatitis C     In remission after Interferon and Ribavirin treatment    HSV-2 infection     Hyperkalemia 05/2020    Hyperlipidemia     Hypersomnia 10/2020    Hypertension     Hyperuricemia 05/2020    Hyponatremia 05/2020    LAFB (left  anterior fascicular block) 02/2021    Lichen simplex chronicus 04/2020    Memory difficulty 02/2020    Mild cognitive impairment     Neutrophilia 08/2020    Nocturia 04/2018    Nocturnal hypoxemia     FOLLOWED BY DR. PATRICIA GOLDSMITH    Non-bullous impetigo 01/2019    NSAID long-term use     PAULETTE (obstructive sleep apnea)     USES BIPAP, 2018 resolved, no longer uses cpap    Osteophyte 10/2018    FOLLOWED BY DR. ENRIKE COSTA    Other synovitis and tenosynovitis, left ankle and foot 08/2018    Peroneal tendonitis 05/2018    BILATERAL    Plantar fascial fibromatosis 05/2018    Polyneuropathy     Pulmonary fibrosis     Purpura 11/02/2019    SEEN AT     PVC (premature ventricular contraction)     Rheumatic fever     IN CHILDHOOD    Seborrheic keratoses     FOLLOWED BY DR. NATHALY LINDSEY    Segmental and somatic dysfunction of cervical region     Segmental and somatic dysfunction of lumbar region     Segmental and somatic dysfunction of sacral region     Segmental and somatic dysfunction of thoracic region     Sensorineural hearing loss (SNHL) of both ears     WEARS HEARING AIDS, FOLLOWED BY DR. JAE DOLL    Sleep related bruxism 08/2021    Strain of coccyx 11/2013    Substance abuse     H/O DRUG ABUSE, NOW SOBER( IV DRUG USE)    Synovial cyst, popliteal, right 12/28/2019    SEEN AT [facility]    Tachycardia 02/2021    Tattoo reaction 04/16/2018    BACTERIAL INFECTION, SEEN AT [facility]    Urge incontinence 03/2022    Vitamin B 12 deficiency     Vitamin D deficiency     Vitreous degeneration of both eyes     Vitreous opacities of right eye        Past Surgical History:   Procedure Laterality Date    ANKLE LIGAMENT RECONSTRUCTION Left 06/10/2019    Procedure: PERONEAL TENDON RECONSTRUCTION AND LEFT 5TH METATARSAL OSTECTOMY;  Surgeon: Enrike Costa MD;  [place] [facility] [place] [facility] [place];  Service: Orthopedics; SURGERY ON SIDE OF FOOT, NOT ANKLE    CARDIAC CATHETERIZATION N/A 05/02/2018    Procedure: Coronary angiography;  Surgeon:  Maik Preciado MD;  Location:  [facility];  Service: Cardiovascular    CARDIAC CATHETERIZATION N/A 05/02/2018    Procedure: Left ventriculography;  Surgeon: Maik Preciado MD;  Location:  [facility];  Service: Cardiovascular    CARDIAC CATHETERIZATION N/A 05/25/2023    Procedure: Coronary angiography;  Surgeon: Maik Preciado MD;  Location:  [facility];  Service: Cardiology;  Laterality: N/A;    CARDIAC CATHETERIZATION N/A 05/25/2023    Procedure: Left Heart Cath;  Surgeon: Maik Preciado MD;  Location:  [facility];  Service: Cardiology;  Laterality: N/A;    CARDIAC CATHETERIZATION N/A 05/25/2023    Procedure: Left ventriculography;  Surgeon: Maik Preciado MD;  Location:  [facility];  Service: Cardiology;  Laterality: N/A;    COLONOSCOPY N/A 09/10/2013    Normal, repeat in 10 years-Dr. Svetlana Ornelas    COLONOSCOPY N/A 11/17/2006    Normal, repeat in 5 years-Dr. Jackson Denise    COLONOSCOPY N/A 08/25/2009    Normal, repeat in 5 years-Dr. Jackson Denise    COLONOSCOPY N/A 04/02/2018    MILD SIGMOID DIVERTICULOSIS, RESCOPE IN 10 YRS, DR. SVETLANA ORNELAS AT [facility]    COLONOSCOPY N/A 09/15/2022    6 MM TUBULAR ADENOMA POLYP IN DESCENDING, 4 MM BENIGN POLYP IN RECTUM, MULTIPLE DIVERTICULA IN SIGMOID, RESCOPE IN 5 YRS, DR. LILIBETH AMADOR AT [facility]    CYST REMOVAL N/A 06/22/2015    ON FOREHEAD, EPIDERMOID/SEBACEOUS, DR. SAHIL PFEIFFER    CYSTOSCOPY N/A     KNEE SURGERY Right     cortisone injections and knee scope, Dr. Steinberg    SHOULDER SURGERY Bilateral 1995    BONE SPURS, DR. TYRA MURILLO    SKIN BIOPSY Right 09/23/2020    RIGHT SCAPHA, (+) NODULAR BCC, DR. NATHALY LINDSEY    SKIN BIOPSY Bilateral 05/31/2018    LEFT CENTRAL PARIETAL SCALP, RIGHT MEDIAL FRONTAL SCALP, AND LEFT NASAL SIDEWALL, DR. NATHALY LINDSEY    TENNIS ELBOW RELEASE Right 07/18/2017    Procedure: RIGHT ELBOW OPEN FLEXOR TENDON DEBRIDEMENT AND REPAIR;  Surgeon: FABIO  Anders Hess MD;  Location[facility] OR [facility] [place];  Service:        Social History     Socioeconomic History    Marital status:      Spouse name: Lucy Cox   Tobacco Use    Smoking status: Former     Current packs/day: 0.00     Average packs/day: 1 pack/day for 20.0 years (20.0 ttl pk-yrs)     Types: Cigarettes     Start date: 1966     Quit date: 1986     Years since quittin.4     Passive exposure: Past    Smokeless tobacco: Never    Tobacco comments:     Caffeine use 2 cups daily   Vaping Use    Vaping status: Never Used   Substance and Sexual Activity    Alcohol use: Not Currently     Comment: Quit /    Drug use: Not Currently     Types: Amphetamines, Cocaine(coke), Hashish, LSD, Marijuana, Mescaline, Methamphetamines     Comment: sober since 35 h/o iv drug use    Sexual activity: Not Currently     Partners: Female     Birth control/protection: None       Family History   Problem Relation Age of Onset    Cancer Mother     Lung disease Mother     COPD Mother     Osteoporosis Mother     Ovarian cancer Mother     Coronary artery disease Father     Heart disease Father         Artery disease    Hypertension Father         Artirial Disease    Alcohol abuse Father     Heart attack Father     Heart failure Father     Diabetes Sister     Hyperlipidemia Sister     Hypertension Sister     Kidney disease Sister     Colon cancer Paternal Uncle         diagnosed in 60s    Hypertension Paternal Uncle     Heart disease Paternal Uncle     Diabetes Paternal Uncle     Cancer Paternal Grandmother     Coronary artery disease Paternal Grandfather     Malig Hyperthermia Neg Hx        The following portion of the patient's history were reviewed and updated as appropriate: past medical history, past surgical history, past social history, past family history, allergies, current medications, and problem list.    Review of Systems   Constitutional: Negative.   Cardiovascular: Negative.    Respiratory: Negative.    "  Hematologic/Lymphatic: Negative.    Neurological: Negative.        No Known Allergies      Current Outpatient Medications:     ACCU-CHEK FASTCLIX LANCETS misc, TEST TID, Disp: , Rfl: 5    ACCU-CHEK SMARTVIEW test strip, USE TO TEST BLOOD SUGAR BID, Disp: , Rfl: 6    aspirin 81 MG EC tablet, Take 1 tablet by mouth Daily., Disp: 30 tablet, Rfl: 11    BD Pen Needle Lien 2nd Gen 32G X 4 MM misc, See Admin Instructions., Disp: , Rfl:     buPROPion XL (WELLBUTRIN XL) 150 MG 24 hr tablet, Take 1 tablet by mouth Daily., Disp: , Rfl:     glimepiride (AMARYL) 4 MG tablet, Take 1 tablet by mouth 2 (Two) Times a Day., Disp: , Rfl: 5    insulin glargine (LANTUS, SEMGLEE) 100 UNIT/ML injection, Inject 50 Units under the skin into the appropriate area as directed Daily., Disp: , Rfl:     Jardiance 25 MG tablet tablet, Take 1 tablet by mouth Every Morning., Disp: , Rfl:     lisinopril (PRINIVIL,ZESTRIL) 10 MG tablet, Take 1 tablet by mouth Daily., Disp: 90 tablet, Rfl: 3    modafinil (PROVIGIL) 100 MG tablet, Take 1 tablet by mouth Daily., Disp: , Rfl:     nebivolol (BYSTOLIC) 20 MG tablet, Take 1 tablet by mouth Every Night., Disp: 90 tablet, Rfl: 3    pantoprazole (PROTONIX) 40 MG EC tablet, 1 tablet., Disp: , Rfl:     SEMAGLUTIDE,0.25 OR 0.5MG/DOS, SC, 1 (One) Time Per Week. MONDAY, Disp: , Rfl:     tamsulosin (FLOMAX) 0.4 MG capsule 24 hr capsule, Take 2 capsules by mouth Daily., Disp: , Rfl:     rosuvastatin (CRESTOR) 40 MG tablet, Take 1 tablet by mouth Every Night., Disp: 90 tablet, Rfl: 3         Objective:     Vitals:    05/20/24 1337 05/20/24 1348   BP: 144/98 138/100   BP Location: Left arm Right arm   Patient Position: Sitting    Cuff Size: Adult    Pulse: 91    SpO2: 97%    Weight: 90.6 kg (199 lb 12.8 oz)    Height: 182.9 cm (72.01\")      Body mass index is 27.09 kg/m².    PHYSICAL EXAM:    Vitals Reviewed.   General Appearance: No acute distress, well developed and well nourished. Obese.   Eyes: Glasses.   HENT: No " hearing loss noted.    Respiratory: No signs of respiratory distress. Respiration rhythm and depth normal.  Clear to auscultation.   Cardiovascular:  Jugular Venous Pressure: Normal  Heart Rate and Rhythm: Normal, Heart Sounds: Normal S1 and S2. No S3 or S4 noted.  Murmurs: No murmurs noted. No rubs, thrills, or gallops.   Lower Extremities: No edema noted.  Musculoskeletal: Normal movement of extremities.  Skin: General appearance normal.    Psychiatric: Patient alert and oriented to person, place, and time. Speech and behavior appropriate. Normal mood and affect.       ECG 12 Lead    Date/Time: 5/20/2024 1:42 PM  Performed by: Katie Souza APRN    Authorized by: Katie Souza APRN  Comparison: compared with previous ECG from 1/26/2024  Similar to previous ECG  Rhythm: sinus rhythm  Rate: normal  BPM: 91  Conduction: left anterior fascicular block  QRS axis: normal  Other findings: non-specific ST-T wave changes and poor R wave progression    Clinical impression: non-specific ECG            Assessment:       Diagnosis Plan   1. Dizziness        2. Near syncope        3. Essential hypertension        4. Coronary artery disease involving native coronary artery of native heart without angina pectoris        5. LAFB (left anterior fascicular block)               Plan:       1/2.  Last week, he had an episode of dizziness while bending over and a near syncopal episode while straining to have a bowel movement.  I suspect something was off balance with his body; possibly he was dehydrated.  Sounds like when he was straining to have a bowel movement he had a vasovagal reaction.  With the reduction of his lisinopril from 20 to 10 mg daily, his blood pressure is now elevated.  I recommended going back to his normal dosages of nebivolol and lisinopril.  If he has further episodes, he will call the office.    3.  Hypertension.  Blood pressure elevated today.    4.  Coronary artery disease: Diagnosed per angiography in  2023 with medical treatment recommended.  Continue aspirin and he should be on statin therapy.    5.  Left anterior fascicular block noted on EKG.    6.  He will call with any concerns.  Follow-up with me in July as previously scheduled.    As always, it has been a pleasure to participate in your patient's care. Thank you.         Sincerely,         TOOTIE Andujar  [facility]      Dictated utilizing Dragon Dictation

## 2024-06-17 RX ORDER — NEBIVOLOL 20 MG/1
20 TABLET ORAL DAILY
Qty: 90 TABLET | Refills: 3 | Status: SHIPPED | OUTPATIENT
Start: 2024-06-17

## 2024-09-27 ENCOUNTER — TELEPHONE (OUTPATIENT)
Dept: CARDIOLOGY | Facility: CLINIC | Age: 74
End: 2024-09-27
Payer: MEDICARE

## 2024-09-29 ENCOUNTER — APPOINTMENT (OUTPATIENT)
Dept: ULTRASOUND IMAGING | Facility: HOSPITAL | Age: 74
End: 2024-09-29
Payer: MEDICARE

## 2024-09-29 ENCOUNTER — APPOINTMENT (OUTPATIENT)
Dept: GENERAL RADIOLOGY | Facility: HOSPITAL | Age: 74
End: 2024-09-29
Payer: MEDICARE

## 2024-09-29 ENCOUNTER — HOSPITAL ENCOUNTER (EMERGENCY)
Facility: HOSPITAL | Age: 74
Discharge: HOME | End: 2024-09-29
Attending: EMERGENCY MEDICINE
Payer: MEDICARE

## 2024-09-29 VITALS
TEMPERATURE: 98.1 F | HEART RATE: 83 BPM | HEIGHT: 72 IN | OXYGEN SATURATION: 99 % | WEIGHT: 210 LBS | BODY MASS INDEX: 28.44 KG/M2 | RESPIRATION RATE: 18 BRPM | DIASTOLIC BLOOD PRESSURE: 83 MMHG | SYSTOLIC BLOOD PRESSURE: 141 MMHG

## 2024-09-29 DIAGNOSIS — R60.0 LOWER EXTREMITY EDEMA: Primary | ICD-10-CM

## 2024-09-29 LAB
ALBUMIN SERPL-MCNC: 4.1 G/DL (ref 3.5–5.2)
ALBUMIN/GLOB SERPL: 1.5 G/DL
ALP SERPL-CCNC: 167 U/L (ref 39–117)
ALT SERPL W P-5'-P-CCNC: 27 U/L (ref 1–41)
ANION GAP SERPL CALCULATED.3IONS-SCNC: 10.6 MMOL/L (ref 5–15)
AST SERPL-CCNC: 29 U/L (ref 1–40)
BASOPHILS # BLD AUTO: 0.04 10*3/MM3 (ref 0–0.2)
BASOPHILS NFR BLD AUTO: 0.4 % (ref 0–1.5)
BILIRUB SERPL-MCNC: 0.8 MG/DL (ref 0–1.2)
BUN SERPL-MCNC: 13 MG/DL (ref 8–23)
BUN/CREAT SERPL: 14.1 (ref 7–25)
CALCIUM SPEC-SCNC: 9.4 MG/DL (ref 8.6–10.5)
CHLORIDE SERPL-SCNC: 100 MMOL/L (ref 98–107)
CO2 SERPL-SCNC: 25.4 MMOL/L (ref 22–29)
CREAT SERPL-MCNC: 0.92 MG/DL (ref 0.76–1.27)
DEPRECATED RDW RBC AUTO: 45.8 FL (ref 37–54)
EGFRCR SERPLBLD CKD-EPI 2021: 87.3 ML/MIN/1.73
EOSINOPHIL # BLD AUTO: 0.26 10*3/MM3 (ref 0–0.4)
EOSINOPHIL NFR BLD AUTO: 2.6 % (ref 0.3–6.2)
ERYTHROCYTE [DISTWIDTH] IN BLOOD BY AUTOMATED COUNT: 14 % (ref 12.3–15.4)
GLOBULIN UR ELPH-MCNC: 2.7 GM/DL
GLUCOSE SERPL-MCNC: 152 MG/DL (ref 65–99)
HCT VFR BLD AUTO: 49.5 % (ref 37.5–51)
HGB BLD-MCNC: 16.4 G/DL (ref 13–17.7)
IMM GRANULOCYTES # BLD AUTO: 0.05 10*3/MM3 (ref 0–0.05)
IMM GRANULOCYTES NFR BLD AUTO: 0.5 % (ref 0–0.5)
LYMPHOCYTES # BLD AUTO: 2.41 10*3/MM3 (ref 0.7–3.1)
LYMPHOCYTES NFR BLD AUTO: 24.2 % (ref 19.6–45.3)
MCH RBC QN AUTO: 29.2 PG (ref 26.6–33)
MCHC RBC AUTO-ENTMCNC: 33.1 G/DL (ref 31.5–35.7)
MCV RBC AUTO: 88.2 FL (ref 79–97)
MONOCYTES # BLD AUTO: 0.7 10*3/MM3 (ref 0.1–0.9)
MONOCYTES NFR BLD AUTO: 7 % (ref 5–12)
NEUTROPHILS NFR BLD AUTO: 6.5 10*3/MM3 (ref 1.7–7)
NEUTROPHILS NFR BLD AUTO: 65.3 % (ref 42.7–76)
NT-PROBNP SERPL-MCNC: 160.8 PG/ML (ref 0–900)
PLATELET # BLD AUTO: 201 10*3/MM3 (ref 140–450)
PMV BLD AUTO: 9.8 FL (ref 6–12)
POTASSIUM SERPL-SCNC: 4.4 MMOL/L (ref 3.5–5.2)
PROT SERPL-MCNC: 6.8 G/DL (ref 6–8.5)
RBC # BLD AUTO: 5.61 10*6/MM3 (ref 4.14–5.8)
SODIUM SERPL-SCNC: 136 MMOL/L (ref 136–145)
WBC NRBC COR # BLD AUTO: 9.96 10*3/MM3 (ref 3.4–10.8)

## 2024-09-29 PROCEDURE — 85025 COMPLETE CBC W/AUTO DIFF WBC: CPT

## 2024-09-29 PROCEDURE — 99284 EMERGENCY DEPT VISIT MOD MDM: CPT

## 2024-09-29 PROCEDURE — 71045 X-RAY EXAM CHEST 1 VIEW: CPT

## 2024-09-29 PROCEDURE — 99282 EMERGENCY DEPT VISIT SF MDM: CPT

## 2024-09-29 PROCEDURE — 36415 COLL VENOUS BLD VENIPUNCTURE: CPT

## 2024-09-29 PROCEDURE — 83880 ASSAY OF NATRIURETIC PEPTIDE: CPT

## 2024-09-29 PROCEDURE — 93971 EXTREMITY STUDY: CPT

## 2024-09-29 PROCEDURE — 80053 COMPREHEN METABOLIC PANEL: CPT

## 2024-09-29 RX ORDER — SODIUM CHLORIDE 0.9 % (FLUSH) 0.9 %
10 SYRINGE (ML) INJECTION AS NEEDED
Status: DISCONTINUED | OUTPATIENT
Start: 2024-09-29 | End: 2024-09-29 | Stop reason: HOSPADM

## 2024-09-29 NOTE — FSED PROVIDER NOTE
Subjective   History of Present Illness  Patient is a 73-year-old male who presents to the emergency department for bilateral ankle swelling x 1 week.  Right ankle is worse than left.  Comes and goes.  Better with keeping his legs elevated.  Onset after a long 8-hour car ride.  Denies calf or knee pain.  Denies shortness of breath or chest pain.        Review of Systems   Constitutional:  Negative for appetite change, chills, diaphoresis, fatigue and fever.   Respiratory:  Negative for cough and shortness of breath.    Cardiovascular:  Positive for leg swelling. Negative for chest pain and palpitations.   Gastrointestinal:  Negative for abdominal pain, constipation, diarrhea, nausea and vomiting.   Musculoskeletal:  Negative for gait problem, joint swelling and myalgias.   Skin:  Negative for color change, pallor, rash and wound.   Neurological:  Negative for dizziness, syncope, weakness, numbness and headaches.       Past Medical History:   Diagnosis Date    Abnormal ECG     Achilles tendinitis of both lower extremities 06/2018    Actinic keratoses 01/2020    FOLLOWED BY DR. NATHALY LINDSEY    Alcohol abuse 1987    H/O ALCOHOL ABUSE, SOBER FOR MANY YEARS    Allergic rhinitis     Arteriosclerotic vascular disease     FOLLOWED BY DR. JOSE CHANG    Asthma     MODERATE, PERSISTANT, FOLLOWED BY DR. PATRICIA GOLDSMITH    BBB (bundle branch block)     LEFT POSTERIOR BUNDLE BRANCH HEMIBLOCK; patient denies    BCC (basal cell carcinoma) 09/2020    RIGHT EAR    Benign paroxysmal positional vertigo of right ear 03/10/2020    SEEN AT     BPH (benign prostatic hyperplasia)     FOLLOWED BY DR. BETSEY FAY    Carotid artery disease     RIGHT    Cataract     BILATERAL    Cellulitis of right foot 03/24/2018    SEEN AT     Cellulitis of right upper arm 04/15/2018    SEEN AT     Cervical radiculopathy 09/2020    Chondromalacia, knee, right 01/2020    Colon polyp     FOLLOWED BY DR. LILIBETH AMADOR    Complex tear of medial  meniscus of right knee 12/2019    Constipation     COPD (chronic obstructive pulmonary disease)     Coronary artery disease     COVID-19 virus infection 08/04/2020    SEEN AT     DDD (degenerative disc disease), cervical     DDD (degenerative disc disease), thoracic     Depression     Diabetes mellitus 2002    TYPE 2, IDDM, FOLLOWED BY DR. KAMILA MURCIA    Diarrhea     Diastasis of rectus abdominis 01/2021    Diastolic dysfunction     Dislocated temporomandibular joint 08/2021    Dizziness 02/16/2021    SEEN AT [facility]    Dry eye syndrome of both eyes     Dyspnea on exertion 04/2018    with fatigue    Elevated LFTs 08/2018    Exostosis of left foot 12/2018    Fecal smearing 02/2018    Gout 05/2019    Heart murmur     Hemorrhoids     Hepatitis C     In remission after Interferon and Ribavirin treatment    HSV-2 infection     Hyperkalemia 05/2020    Hyperlipidemia     Hypersomnia 10/2020    Hypertension     Hyperuricemia 05/2020    Hyponatremia 05/2020    LAFB (left anterior fascicular block) 02/2021    Lichen simplex chronicus 04/2020    Memory difficulty 02/2020    Mild cognitive impairment     Neutrophilia 08/2020    Nocturia 04/2018    Nocturnal hypoxemia     FOLLOWED BY DR. PATRICIA GOLDSMITH    Non-bullous impetigo 01/2019    NSAID long-term use     PAULETTE (obstructive sleep apnea)     USES BIPAP, 2018 resolved, no longer uses cpap    Osteophyte 10/2018    FOLLOWED BY DR. ENRIKE COSTA    Other synovitis and tenosynovitis, left ankle and foot 08/2018    Peroneal tendonitis 05/2018    BILATERAL    Plantar fascial fibromatosis 05/2018    Polyneuropathy     Pulmonary fibrosis     Purpura 11/02/2019    SEEN AT     PVC (premature ventricular contraction)     Rheumatic fever     IN CHILDHOOD    Seborrheic keratoses     FOLLOWED BY DR. NATHALY LINDSEY    Segmental and somatic dysfunction of cervical region     Segmental and somatic dysfunction of lumbar region     Segmental and somatic dysfunction of sacral region      Segmental and somatic dysfunction of thoracic region     Sensorineural hearing loss (SNHL) of both ears     WEARS HEARING AIDS, FOLLOWED BY DR. JAE DOLL    Sleep related bruxism 08/2021    Strain of coccyx 11/2013    Substance abuse     H/O DRUG ABUSE, NOW SOBER( IV DRUG USE)    Synovial cyst, popliteal, right 12/28/2019    SEEN AT [facility]    Tachycardia 02/2021    Tattoo reaction 04/16/2018    BACTERIAL INFECTION, SEEN AT [facility]    Urge incontinence 03/2022    Vitamin B 12 deficiency     Vitamin D deficiency     Vitreous degeneration of both eyes     Vitreous opacities of right eye        No Known Allergies    Past Surgical History:   Procedure Laterality Date    ANKLE LIGAMENT RECONSTRUCTION Left 06/10/2019    Procedure: PERONEAL TENDON RECONSTRUCTION AND LEFT 5TH METATARSAL OSTECTOMY;  Surgeon: Ravi Ruby MD;  [place]  [facility] [place] [facility] [place]  Service: Orthopedics; SURGERY ON SIDE OF FOOT, NOT ANKLE    CARDIAC CATHETERIZATION N/A 05/02/2018    Procedure: Coronary angiography;  Surgeon: Maik Preciado MD;  Location:  [facility] CATH INVASIVE LOCATION;  Service: Cardiovascular    CARDIAC CATHETERIZATION N/A 05/02/2018    Procedure: Left ventriculography;  Surgeon: Maik Preciado MD;  Location:  [facility] CATH INVASIVE LOCATION;  Service: Cardiovascular    CARDIAC CATHETERIZATION N/A 05/25/2023    Procedure: Coronary angiography;  Surgeon: Maik Preciado MD;  Location:  [facility] CATH INVASIVE LOCATION;  Service: Cardiology;  Laterality: N/A;    CARDIAC CATHETERIZATION N/A 05/25/2023    Procedure: Left Heart Cath;  Surgeon: Maik Preciado MD;  Location: [facility] CATH INVASIVE LOCATION;  Service: Cardiology;  Laterality: N/A;    CARDIAC CATHETERIZATION N/A 05/25/2023    Procedure: Left ventriculography;  Surgeon: Maik Preciado MD;  Location:  [facility] CATH INVASIVE LOCATION;  Service: Cardiology;  Laterality: N/A;    COLONOSCOPY N/A 09/10/2013    Normal, repeat in 10 years-Dr. Svetlana Ornelas    COLONOSCOPY N/A 11/17/2006     Normal, repeat in 5 years-Dr. Jackson Denise    COLONOSCOPY N/A 08/25/2009    Normal, repeat in 5 years-Dr. Jackson Denise    COLONOSCOPY N/A 04/02/2018    MILD SIGMOID DIVERTICULOSIS, RESCOPE IN 10 YRS, DR. ALLIE BARRON AT [facility]    COLONOSCOPY N/A 09/15/2022    6 MM TUBULAR ADENOMA POLYP IN DESCENDING, 4 MM BENIGN POLYP IN RECTUM, MULTIPLE DIVERTICULA IN SIGMOID, RESCOPE IN 5 YRS, DR. LILIBETH AMADOR AT [facility]    CYST REMOVAL N/A 06/22/2015    ON FOREHEAD, EPIDERMOID/SEBACEOUS, DR. SAHIL PFEIFFER    CYSTOSCOPY N/A     KNEE SURGERY Right     cortisone injections and knee scope, Dr. Steinberg    SHOULDER SURGERY Bilateral 1995    BONE SPURS, DR. TYRA HESS    SKIN BIOPSY Right 09/23/2020    RIGHT SCAPHA, (+) NODULAR BCC, DR. NATHALY LINDSEY    SKIN BIOPSY Bilateral 05/31/2018    LEFT CENTRAL PARIETAL SCALP, RIGHT MEDIAL FRONTAL SCALP, AND LEFT NASAL SIDEWALL, DR. NATHALY LINDSEY    TENNIS ELBOW RELEASE Right 07/18/2017    Procedure: RIGHT ELBOW OPEN FLEXOR TENDON DEBRIDEMENT AND REPAIR;  Surgeon: FABIO Hess MD;  [facility]: [facility] [place];  Service:        Family History   Problem Relation Age of Onset    Cancer Mother     Lung disease Mother     COPD Mother     Osteoporosis Mother     Ovarian cancer Mother     Coronary artery disease Father     Heart disease Father         Artery disease    Hypertension Father         Artirial Disease    Alcohol abuse Father     Heart attack Father     Heart failure Father     Diabetes Sister     Hyperlipidemia Sister     Hypertension Sister     Kidney disease Sister     Colon cancer Paternal Uncle         diagnosed in 60s    Hypertension Paternal Uncle     Heart disease Paternal Uncle     Diabetes Paternal Uncle     Cancer Paternal Grandmother     Coronary artery disease Paternal Grandfather     Malig Hyperthermia Neg Hx        Social History     Socioeconomic History    Marital status:      Spouse name: Lucy Cox   Tobacco Use    Smoking status: Former     Current packs/day:  0.00     Average packs/day: 1 pack/day for 20.0 years (20.0 ttl pk-yrs)     Types: Cigarettes     Start date: 1966     Quit date: 1986     Years since quittin.7     Passive exposure: Past    Smokeless tobacco: Never    Tobacco comments:     Caffeine use 2 cups daily   Vaping Use    Vaping status: Never Used   Substance and Sexual Activity    Alcohol use: Not Currently     Comment: Quit /    Drug use: Not Currently     Types: Amphetamines, Cocaine(coke), Hashish, LSD, Marijuana, Mescaline, Methamphetamines     Comment: sober since 35 h/o iv drug use    Sexual activity: Not Currently     Partners: Female     Birth control/protection: None           Objective   Physical Exam  Constitutional:       General: He is not in acute distress.     Appearance: He is normal weight. He is not ill-appearing, toxic-appearing or diaphoretic.   Cardiovascular:      Rate and Rhythm: Normal rate and regular rhythm.   Pulmonary:      Effort: Pulmonary effort is normal. No respiratory distress.      Breath sounds: Normal breath sounds.   Musculoskeletal:         General: No tenderness. Normal range of motion.      Comments: Some mild to moderate edema around the right ankle.  No perceptible edema around the left ankle.  Otherwise no leg swelling bilaterally.  No calf or popliteal tenderness.  Normal distal sensation, capillary refill, DP pulses.   Skin:     General: Skin is warm and dry.      Capillary Refill: Capillary refill takes less than 2 seconds.      Findings: No bruising, erythema, lesion or rash.   Neurological:      Mental Status: He is alert.   Psychiatric:         Mood and Affect: Mood normal.         Behavior: Behavior normal.         Procedures           ED Course  ED Course as of 24   Sun Sep 29, 2024   1450 XR CHEST 1 VW-        INDICATION: Leg swelling     COMPARISON: Chest radiograph 2019     TECHNIQUE: 1 view chest     FINDINGS:      Increased lung markings. No focal opacity.  No effusions. Stable  mediastinum. Heart is normal in size.     IMPRESSION:  Increased lung markings, suspect related underlying airways disease. No  lung opacities.   [AS]   1450 Normal CBC, CMP with an elevated glucose and alk phos.  Normal BNP. [AS]   1450 Negative venous Doppler ultrasound. [AS]      ED Course User Index  [AS] Samantha Hess, GINO                                           Medical Decision Making  Patient is a 73-year-old male who presents to the emergency department for ankle swelling.  He overall appears well, no acute distress, nontoxic.  Vital signs are WNL.  He has no concerning associated symptoms.  Workup is negative for DVT and CHF.  Overall history and exam is most consistent with a gravity dependent edema.  Symptoms are coming and going with symptomatic measures, specifically elevation.  Has not tried compression stockings yet.  I encouraged this.  Discussed when to return to the emergency department.  Answered all questions.  Patient verbalized understanding and was agreeable to plan and discharge.    My differential diagnosis includes but is not limited to: Fracture, dislocation, sprain, strain, gout, septic arthritis, CHF, gravity dependent edema, DVT, PE, claudication, PVD, PAD    Problems Addressed:  Lower extremity edema: complicated acute illness or injury    Amount and/or Complexity of Data Reviewed  Labs: ordered.  Radiology: ordered.    Risk  Prescription drug management.        Final diagnoses:   Lower extremity edema       ED Disposition  ED Disposition       ED Disposition   Discharge    Condition   Stable    Comment   --               Thuy Palm MD  [id number]  [facility] [address] [phone]    Schedule an appointment as soon as possible for a visit            Medication List      No changes were made to your prescriptions during this visit.

## 2024-09-29 NOTE — DISCHARGE INSTRUCTIONS
There is no indication for blood clot or heart failure as cause of your leg swelling today.  Much more likely vascular gravity dependent.  Recommend compression stockings and keeping legs elevated when possible.  Continue to monitor closely.  Follow-up with your primary care for ongoing evaluation and management.  Return to emergency department for worsening symptoms or other medical emergencies.  Refer to the attached instructions for further information.

## 2024-09-30 ENCOUNTER — PATIENT ROUNDING (BHMG ONLY) (OUTPATIENT)
Dept: URGENT CARE | Facility: CLINIC | Age: 74
End: 2024-09-30
Payer: MEDICARE

## 2024-09-30 NOTE — ED NOTES
Thank you for letting us care for you during your recent visit at [facility]. We would love to hear about your experience with us.     We’re always looking for ways to make our patients’ experiences even better. Do you have any recommendations on ways we may improve?    I appreciate you taking the time to respond. Please be on the lookout for a survey about your recent visit from [facility] via text or email. We would greatly appreciate if you could fill that out and turn it back in. We want your voice to be heard and we value your feedback.     Thank you for choosing [facility] for your healthcare needs.

## 2025-02-18 ENCOUNTER — OFFICE VISIT (OUTPATIENT)
Dept: CARDIOLOGY | Facility: CLINIC | Age: 75
End: 2025-02-18
Payer: MEDICARE

## 2025-02-18 VITALS
HEIGHT: 72 IN | HEART RATE: 91 BPM | BODY MASS INDEX: 27.63 KG/M2 | WEIGHT: 204 LBS | DIASTOLIC BLOOD PRESSURE: 80 MMHG | SYSTOLIC BLOOD PRESSURE: 112 MMHG

## 2025-02-18 DIAGNOSIS — E78.2 MIXED HYPERLIPIDEMIA: ICD-10-CM

## 2025-02-18 DIAGNOSIS — I44.4 LAFB (LEFT ANTERIOR FASCICULAR BLOCK): ICD-10-CM

## 2025-02-18 DIAGNOSIS — I25.10 CORONARY ARTERY DISEASE INVOLVING NATIVE CORONARY ARTERY OF NATIVE HEART WITHOUT ANGINA PECTORIS: Primary | ICD-10-CM

## 2025-02-18 DIAGNOSIS — I10 ESSENTIAL HYPERTENSION: ICD-10-CM

## 2025-02-18 PROCEDURE — 3079F DIAST BP 80-89 MM HG: CPT | Performed by: INTERNAL MEDICINE

## 2025-02-18 PROCEDURE — 99214 OFFICE O/P EST MOD 30 MIN: CPT | Performed by: INTERNAL MEDICINE

## 2025-02-18 PROCEDURE — 93000 ELECTROCARDIOGRAM COMPLETE: CPT | Performed by: INTERNAL MEDICINE

## 2025-02-18 PROCEDURE — 1160F RVW MEDS BY RX/DR IN RCRD: CPT | Performed by: INTERNAL MEDICINE

## 2025-02-18 PROCEDURE — 1159F MED LIST DOCD IN RCRD: CPT | Performed by: INTERNAL MEDICINE

## 2025-02-18 PROCEDURE — 3074F SYST BP LT 130 MM HG: CPT | Performed by: INTERNAL MEDICINE

## 2025-02-18 RX ORDER — ATORVASTATIN CALCIUM 80 MG/1
80 TABLET, FILM COATED ORAL NIGHTLY
Start: 2025-02-18

## 2025-02-18 NOTE — PROGRESS NOTES
Date of Office Visit: 2025  Encounter Provider: Isabelle Flores MD  Place of Service: [facility] CARDIOLOGY  Patient Name: Mike Cox  :1950      Patient ID:  Mike Cox is a 74 y.o. male is here for  followup for cardiac risks.        History of Present Illness    He has hypertension, hyperlipidemia, diabetes mellitus type 2, and obstructive sleep  apnea, coronary artery calcification with mild to moderate coronary artery disease.     He first presented to the office for chest pain in .  Cardiac workup at that time was normal.     He had a stress nuclear perfusion study done on 2015,  showing no evidence of ischemia and ejection fraction of 57%. He had a 2-D echocardiogram  with Doppler done on 2014, showing an ejection fraction of 54% and grade 1 diastolic  dysfunction. He had carotid duplex studies done on 2015, which showed moderate  intimal thickening of the right internal carotid artery and moderate plaque in the left  internal carotid artery.      He had a stress echocardiogram done 18 showing baseline ejection fraction 59% with postexercise ejection fraction of 68% with septal hypokinesis which may be secondary to bundle branch block, grade 1 diastolic dysfunction and no valvular disease.  Cardiac catheterization done 18 which showed normal left main, large dominant normal circumflex, small nondominant RCA, 10-20% proximal LAD stenosis with normal mid and distal LAD.  30-40% first diagonal stenosis.  Normal left ventricular systolic function.     Vascular screening done 2020 showed bilateral carotid plaquing, no stenosis, otherwise normal vascular screening.  Echo done 3/8/2021 showed normal ejection fraction 55% normal saline contrast study, normal diastolic function no significant valvular disease.     He had a nonischemic stress nuclear perfusion study done 2022.  Echocardiogram done 2023 was  normal with ejection fraction of 56%.  Vascular screening done 5/12/2023 showed mild bilateral carotid artery stenosis and was otherwise normal.  Calcium score done 5/12/2023 was 772 with 516 in the LAD, 178 circumflex, 76 left main.  I did review the images and the calcification is in the proximal LAD.  Cardiac catheterization done 5/25/2023 showed normal left main, 20% diffuse LAD stenosis, 70% moderate sized diagonal stenosis, 20% circumflex stenosis, small nondominant RCA with no disease, no ramus intermedius.  Medical management was recommended.  Treadmill stress study done 7/27/2023 showed no evidence of ischemia.    Labs done 9/29/2024 showed normal proBNP, glucose 152, alkaline phosphatase 167 with otherwise normal CMP and CBC.  Hemoglobin A1c done 12/17/2024 is 8.2%.  He has no exertional chest pain or pressure.  He has no exertional difficulty breathing.  He has no orthopnea or PND.  He takes his medications as directed without difficulty.  He does have a lot of fatigue.  He is not exercising.  [facility]  He has no tachycardia, dizziness, syncope.    Past Medical History:   Diagnosis Date    Abnormal ECG     Achilles tendinitis of both lower extremities 06/2018    Actinic keratoses 01/2020    FOLLOWED BY DR. NATHALY LINDSEY    Alcohol abuse 1987    H/O ALCOHOL ABUSE, SOBER FOR MANY YEARS    Allergic rhinitis     Arteriosclerotic vascular disease     FOLLOWED BY DR. JOSE CHANG    Asthma     MODERATE, PERSISTANT, FOLLOWED BY DR. PATRICIA GOLDSMITH    BBB (bundle branch block)     LEFT POSTERIOR BUNDLE BRANCH HEMIBLOCK; patient denies    BCC (basal cell carcinoma) 09/2020    RIGHT EAR    Benign paroxysmal positional vertigo of right ear 03/10/2020    SEEN AT     BPH (benign prostatic hyperplasia)     FOLLOWED BY DR. BETSEY FAY    Carotid artery disease     RIGHT    Cataract     BILATERAL    Cellulitis of right foot 03/24/2018    SEEN AT      Cellulitis of right upper arm 04/15/2018    SEEN AT     Cervical radiculopathy 09/2020    Chondromalacia, knee, right 01/2020    Colon polyp     FOLLOWED BY DR. LILIBETH AMADOR    Complex tear of medial meniscus of right knee 12/2019    Constipation     COPD (chronic obstructive pulmonary disease)     Coronary artery disease     COVID-19 virus infection 08/04/2020    SEEN AT     DDD (degenerative disc disease), cervical     DDD (degenerative disc disease), thoracic     Depression     Diabetes mellitus 2002    TYPE 2, IDDM, FOLLOWED BY DR. KAMILA MURCIA    Diarrhea     Diastasis of rectus abdominis 01/2021    Diastolic dysfunction     Dislocated temporomandibular joint 08/2021    Dizziness 02/16/2021    SEEN AT [facility]    Dry eye syndrome of both eyes     Dyspnea on exertion 04/2018    with fatigue    Elevated LFTs 08/2018    Exostosis of left foot 12/2018    Fecal smearing 02/2018    Gout 05/2019    Heart murmur     Hemorrhoids     Hepatitis C     In remission after Interferon and Ribavirin treatment    HSV-2 infection     Hyperkalemia 05/2020    Hyperlipidemia     Hypersomnia 10/2020    Hypertension     Hyperuricemia 05/2020    Hyponatremia 05/2020    LAFB (left anterior fascicular block) 02/2021    Lichen simplex chronicus 04/2020    Memory difficulty 02/2020    Mild cognitive impairment     Neutrophilia 08/2020    Nocturia 04/2018    Nocturnal hypoxemia     FOLLOWED BY DR. PATRICIA GOLDSMITH    Non-bullous impetigo 01/2019    NSAID long-term use     PAULETTE (obstructive sleep apnea)     USES BIPAP, 2018 resolved, no longer uses cpap    Osteophyte 10/2018    FOLLOWED BY DR. ENRIKE COSTA    Other synovitis and tenosynovitis, left ankle and foot 08/2018    Peroneal tendonitis 05/2018    BILATERAL    Plantar fascial fibromatosis 05/2018    Polyneuropathy     Pulmonary fibrosis     Purpura 11/02/2019    SEEN AT     PVC (premature ventricular contraction)     Rheumatic fever     IN CHILDHOOD    Seborrheic keratoses      FOLLOWED BY DR. NATHALY LINDSEY    Segmental and somatic dysfunction of cervical region     Segmental and somatic dysfunction of lumbar region     Segmental and somatic dysfunction of sacral region     Segmental and somatic dysfunction of thoracic region     Sensorineural hearing loss (SNHL) of both ears     WEARS HEARING AIDS, FOLLOWED BY DR. JAE DOLL    Sleep related bruxism 08/2021    Strain of coccyx 11/2013    Substance abuse     H/O DRUG ABUSE, NOW SOBER( IV DRUG USE)    Synovial cyst, popliteal, right 12/28/2019    SEEN AT [facility]    Tachycardia 02/2021    Tattoo reaction 04/16/2018    BACTERIAL INFECTION, SEEN AT [facility]    Urge incontinence 03/2022    Vitamin B 12 deficiency     Vitamin D deficiency     Vitreous degeneration of both eyes     Vitreous opacities of right eye          Past Surgical History:   Procedure Laterality Date    ANKLE LIGAMENT RECONSTRUCTION Left 06/10/2019    Procedure: PERONEAL TENDON RECONSTRUCTION AND LEFT 5TH METATARSAL OSTECTOMY;  Surgeon: Ravi Ruby MD;  Location: [facility] OR [facility] [place];  Service: Orthopedics; SURGERY ON SIDE OF FOOT, NOT ANKLE    CARDIAC CATHETERIZATION N/A 05/02/2018    Procedure: Coronary angiography;  Surgeon: Maik Preciado MD;  Location: [facility] CATH INVASIVE LOCATION;  Service: Cardiovascular    CARDIAC CATHETERIZATION N/A 05/02/2018    Procedure: Left ventriculography;  Surgeon: Maik Preciado MD;  Location: [facility] CATH INVASIVE LOCATION;  Service: Cardiovascular    CARDIAC CATHETERIZATION N/A 05/25/2023    Procedure: Coronary angiography;  Surgeon: Maik Preciado MD;  Location: [facility] CATH INVASIVE LOCATION;  Service: Cardiology;  Laterality: N/A;    CARDIAC CATHETERIZATION N/A 05/25/2023    Procedure: Left Heart Cath;  Surgeon: Maik Preciado MD;  Location: [facility] CATH INVASIVE LOCATION;  Service: Cardiology;  Laterality: N/A;    CARDIAC CATHETERIZATION N/A 05/25/2023    Procedure: Left ventriculography;  Surgeon: Maik Preciado MD;  Location:   [facility] CATH INVASIVE LOCATION;  Service: Cardiology;  Laterality: N/A;    COLONOSCOPY N/A 09/10/2013    Normal, repeat in 10 years-Dr. Svetlana Ornelas    COLONOSCOPY N/A 11/17/2006    Normal, repeat in 5 years-Dr. Jackson Denise    COLONOSCOPY N/A 08/25/2009    Normal, repeat in 5 years-Dr. Jackson Denise    COLONOSCOPY N/A 04/02/2018    MILD SIGMOID DIVERTICULOSIS, RESCOPE IN 10 YRS, DR. SVETLANA ORNELAS AT [facility]    COLONOSCOPY N/A 09/15/2022    6 MM TUBULAR ADENOMA POLYP IN DESCENDING, 4 MM BENIGN POLYP IN RECTUM, MULTIPLE DIVERTICULA IN SIGMOID, RESCOPE IN 5 YRS, DR. LILIBETH AMADOR AT [facility]    CYST REMOVAL N/A 06/22/2015    ON FOREHEAD, EPIDERMOID/SEBACEOUS, DR. SAHIL PFEIFFER    CYSTOSCOPY N/A     KNEE SURGERY Right     cortisone injections and knee scope, Dr. Steinberg    SHOULDER SURGERY Bilateral 1995    BONE SPURS, DR. TYRA HESS    SKIN BIOPSY Right 09/23/2020    RIGHT SCAPHA, (+) NODULAR BCC, DR. NATHALY LINDSEY    SKIN BIOPSY Bilateral 05/31/2018    LEFT CENTRAL PARIETAL SCALP, RIGHT MEDIAL FRONTAL SCALP, AND LEFT NASAL SIDEWALL, DR. NATHALY LINDSEY    TENNIS ELBOW RELEASE Right 07/18/2017    Procedure: RIGHT ELBOW OPEN FLEXOR TENDON DEBRIDEMENT AND REPAIR;  Surgeon: FABIO Hess MD;  Location: [facility] OR [facility] [place];  Service:        Current Outpatient Medications on File Prior to Visit   Medication Sig Dispense Refill    ACCU-CHEK FASTCLIX LANCETS misc TEST TID  5    ACCU-CHEK SMARTVIEW test strip USE TO TEST BLOOD SUGAR BID  6    aspirin 81 MG EC tablet Take 1 tablet by mouth Daily. 30 tablet 11    BD Pen Needle Lien 2nd Gen 32G X 4 MM misc See Admin Instructions.      buPROPion XL (WELLBUTRIN XL) 300 MG 24 hr tablet Take 1 tablet by mouth Daily.      glimepiride (AMARYL) 4 MG tablet Take 1 tablet by mouth 2 (Two) Times a Day.  5    insulin glargine (LANTUS, SEMGLEE) 100 UNIT/ML injection Inject 50 Units under the skin into the appropriate area as directed Daily.      Jardiance 25 MG tablet tablet Take 1 tablet by mouth  Every Morning.      lisinopril (PRINIVIL,ZESTRIL) 10 MG tablet Take 1 tablet by mouth Daily. (Patient taking differently: Take 2 tablets by mouth Daily.) 90 tablet 3    modafinil (PROVIGIL) 100 MG tablet Take 1 tablet by mouth Daily.      nebivolol (BYSTOLIC) 20 MG tablet TAKE 1 TABLET BY MOUTH DAILY 90 tablet 3    pantoprazole (PROTONIX) 40 MG EC tablet 1 tablet.      SEMAGLUTIDE,0.25 OR 0.5MG/DOS, SC 1 (One) Time Per Week. MONDAY      tamsulosin (FLOMAX) 0.4 MG capsule 24 hr capsule Take 2 capsules by mouth Daily.      rosuvastatin (CRESTOR) 40 MG tablet Take 1 tablet by mouth Every Night. 90 tablet 3     No current facility-administered medications on file prior to visit.       Social History     Socioeconomic History    Marital status:      Spouse name: Lucy Cox   Tobacco Use    Smoking status: Former     Current packs/day: 0.00     Average packs/day: 1 pack/day for 20.0 years (20.0 ttl pk-yrs)     Types: Cigarettes     Start date: 1966     Quit date: 1986     Years since quittin.1     Passive exposure: Past    Smokeless tobacco: Never    Tobacco comments:     Caffeine use 2 cups daily   Vaping Use    Vaping status: Never Used   Substance and Sexual Activity    Alcohol use: Not Currently     Comment: Recovering drug addict 39 years    Drug use: Not Currently     Types: Amphetamines, Cocaine(coke), Hashish, LSD, Marijuana, Mescaline, Methamphetamines     Comment: sober since 35 h/o iv drug use    Sexual activity: Not Currently     Partners: Female     Birth control/protection: None             Procedures    ECG 12 Lead    Date/Time: 2025 1:12 PM  Performed by: Isabelle Flores MD    Authorized by: Isabelle Flores MD  Comparison: compared with previous ECG   Similar to previous ECG  Rhythm: sinus rhythm  Conduction: left anterior fascicular block  T inversion: aVL    Clinical impression: abnormal EKG              Objective:      Vitals:    25 1308   BP: 112/80  "  Pulse: 91   Weight: 92.5 kg (204 lb)   Height: 182.9 cm (72\")     Body mass index is 27.67 kg/m².    Vitals reviewed.   Constitutional:       General: Not in acute distress.     Appearance: Not diaphoretic.   Neck:      Vascular: No carotid bruit or JVD.   Pulmonary:      Effort: Pulmonary effort is normal.      Breath sounds: Normal breath sounds.   Cardiovascular:      Normal rate. Regular rhythm.      Murmurs: There is no murmur.      No gallop.  No rub.   Pulses:     Intact distal pulses.      Carotid: 2+ bilaterally.     Radial: 2+ bilaterally.     Dorsalis pedis: 2+ bilaterally.     Posterior tibial: 2+ bilaterally.  Edema:     Peripheral edema absent.   Neurological:      Cranial Nerves: No cranial nerve deficit.         Lab Review:       Assessment:      Diagnosis Plan   1. Coronary artery disease involving native coronary artery of native heart without angina pectoris        2. Essential hypertension  Vascular Screening (Bundle) CAR      3. Mixed hyperlipidemia  Vascular Screening (Bundle) CAR      4. LAFB (left anterior fascicular block)  Vascular Screening (Bundle) CAR              CAD with coronary calcification, mild, last catheterization done 5/2023.  He has no angina.  LAFB, chronic  Hypertension, well-controlled  Diabetes mellitus, on insulin, Amaryl and semaglutide  Hyperlipidemia, on atorvastatin  Hepatitis C, stable  PAULETTE, CPAP  Mild bilateral carotid artery stenosis  History of rheumatic fever without any sequelae.  History of gout  Fatigue, may be related to medications and age but he needs to exercise as this would help his energy level.     Plan:       No medication changes, [facility] and [facility]  Set vascular screening, see me in 1 year.  Needs to exercise-otherwise overall doing well.      "

## 2025-05-02 PROBLEM — R06.09 DYSPNEA ON EXERTION: Status: ACTIVE | Noted: 2018-04-24

## 2025-05-02 PROBLEM — E11.9 DIABETES MELLITUS, TYPE II: Status: ACTIVE | Noted: 2023-01-06

## 2025-05-02 PROBLEM — Z79.4 ENCOUNTER FOR LONG-TERM (CURRENT) USE OF INSULIN: Status: ACTIVE | Noted: 2024-01-23

## 2025-05-02 PROBLEM — E11.65 TYPE 2 DIABETES MELLITUS WITH HYPERGLYCEMIA: Status: ACTIVE | Noted: 2024-01-23

## 2025-05-02 PROBLEM — K21.9 GASTROESOPHAGEAL REFLUX DISEASE: Status: ACTIVE | Noted: 2025-05-02

## 2025-05-02 PROBLEM — R19.8 ALTERED BOWEL FUNCTION: Status: ACTIVE | Noted: 2022-08-10

## 2025-05-02 PROBLEM — E78.5 DYSLIPIDEMIA: Status: ACTIVE | Noted: 2020-02-21

## 2025-05-02 PROBLEM — R13.10 DYSPHAGIA: Status: ACTIVE | Noted: 2022-11-29

## 2025-05-02 PROBLEM — I10 BENIGN ESSENTIAL HYPERTENSION: Status: ACTIVE | Noted: 2023-01-06

## 2025-05-02 PROBLEM — R68.89 OTHER GENERAL SYMPTOMS AND SIGNS: Status: ACTIVE | Noted: 2024-01-18

## 2025-05-02 PROBLEM — G47.419 NARCOLEPSY: Status: ACTIVE | Noted: 2024-01-18

## 2025-05-02 PROBLEM — H91.90 HEARING LOSS: Status: ACTIVE | Noted: 2020-02-21

## 2025-05-02 PROBLEM — K73.9 CHRONIC HEPATITIS: Status: ACTIVE | Noted: 2024-01-23

## 2025-05-02 PROBLEM — Z46.1 ENCOUNTER FOR FITTING AND ADJUSTMENT OF HEARING AID: Status: ACTIVE | Noted: 2024-01-18

## 2025-05-02 PROBLEM — I44.4 LEFT ANTERIOR FASCICULAR BLOCK: Status: ACTIVE | Noted: 2020-08-06

## 2025-05-02 PROBLEM — R32 URINARY INCONTINENCE: Status: ACTIVE | Noted: 2020-12-21

## 2025-05-02 PROBLEM — M20.21 ACQUIRED HALLUX RIGIDUS OF RIGHT FOOT: Status: ACTIVE | Noted: 2024-07-31

## 2025-05-02 PROBLEM — F19.11 HISTORY OF DRUG ABUSE: Status: ACTIVE | Noted: 2020-02-21

## 2025-05-02 PROBLEM — M79.671 PAIN IN RIGHT FOOT: Status: ACTIVE | Noted: 2024-07-31

## 2025-05-02 PROBLEM — M10.9 GOUT: Status: ACTIVE | Noted: 2024-07-31

## 2025-05-02 PROBLEM — M25.569 KNEE PAIN: Status: ACTIVE | Noted: 2024-01-18

## 2025-05-02 PROBLEM — M25.569 KNEE PAIN: Status: ACTIVE | Noted: 2025-05-02

## 2025-05-02 PROBLEM — K21.9 GASTROESOPHAGEAL REFLUX DISEASE WITHOUT ESOPHAGITIS: Status: ACTIVE | Noted: 2022-10-27

## 2025-05-02 PROBLEM — Z13.5 ENCOUNTER FOR SCREENING FOR EYE AND EAR DISORDERS: Status: ACTIVE | Noted: 2024-01-18

## 2025-05-02 PROBLEM — I25.10 CALCIFICATION OF CORONARY ARTERY: Status: ACTIVE | Noted: 2023-05-19

## 2025-05-02 PROBLEM — H90.3 BILATERAL SENSORINEURAL HEARING LOSS: Status: ACTIVE | Noted: 2024-01-18

## 2025-05-02 PROBLEM — Z51.81 ENCOUNTER FOR THERAPEUTIC DRUG LEVEL MONITORING: Status: ACTIVE | Noted: 2024-01-18

## 2025-05-02 PROBLEM — K42.9 UMBILICAL HERNIA: Status: ACTIVE | Noted: 2017-10-25

## 2025-05-02 PROBLEM — R41.3 MEMORY IMPAIRMENT: Status: ACTIVE | Noted: 2020-02-21

## 2025-05-02 PROBLEM — R39.15 URINARY URGENCY: Status: ACTIVE | Noted: 2020-10-08

## 2025-05-02 PROBLEM — N40.0 BENIGN PROSTATIC HYPERPLASIA: Status: ACTIVE | Noted: 2020-02-21

## 2025-05-02 PROBLEM — M19.90 ARTHRITIS: Status: ACTIVE | Noted: 2025-05-02

## 2025-07-07 RX ORDER — NEBIVOLOL 20 MG/1
20 TABLET ORAL DAILY
Qty: 90 TABLET | Refills: 3 | Status: SHIPPED | OUTPATIENT
Start: 2025-07-07

## 2025-07-07 NOTE — TELEPHONE ENCOUNTER
Failed protocol    NOV-03/03/26-RM  LOV-02/18/25-RM    CAD with coronary calcification, mild, last catheterization done 5/2023.  He has no angina.  LAFB, chronic  Hypertension, well-controlled  Diabetes mellitus, on insulin, Amaryl and semaglutide  Hyperlipidemia, on atorvastatin  Hepatitis C, stable  PAULETTE, CPAP  Mild bilateral carotid artery stenosis  History of rheumatic fever without any sequelae.  History of gout  Fatigue, may be related to medications and age but he needs to exercise as this would help his energy level.     Plan:       No medication changes, [facility] and [facility].  Set vascular screening, see me in 1 year.  Needs to exercise-otherwise overall doing well.

## 2025-08-01 ENCOUNTER — TRANSCRIBE ORDERS (OUTPATIENT)
Dept: PHYSICAL THERAPY | Facility: CLINIC | Age: 75
End: 2025-08-01
Payer: MEDICARE

## 2025-08-01 ENCOUNTER — TELEPHONE (OUTPATIENT)
Dept: CARDIOLOGY | Age: 75
End: 2025-08-01
Payer: MEDICARE

## 2025-08-01 DIAGNOSIS — M67.88 OTHER SPECIFIED DISORDERS OF SYNOVIUM AND TENDON, OTHER SITE: Primary | ICD-10-CM

## 2025-08-01 NOTE — TELEPHONE ENCOUNTER
Patient is wanting to start working out in the gym. He is wanting to know if it is ok for him to do?  Please advise.    Megha

## 2025-08-01 NOTE — TELEPHONE ENCOUNTER
Called and spoke with the patient and let him know the above.  He verbalized understanding.    Megha

## 2025-08-01 NOTE — TELEPHONE ENCOUNTER
Yes that is okay, he is to get 30 minutes of cardiovascular exercise daily in addition to light strength training.

## 2025-08-20 ENCOUNTER — OFFICE VISIT (OUTPATIENT)
Dept: CARDIOLOGY | Age: 75
End: 2025-08-20
Payer: MEDICARE

## 2025-08-20 ENCOUNTER — HOSPITAL ENCOUNTER (OUTPATIENT)
Dept: CARDIOLOGY | Facility: HOSPITAL | Age: 75
Discharge: HOME | End: 2025-08-20
Admitting: NURSE PRACTITIONER
Payer: MEDICARE

## 2025-08-20 VITALS — HEART RATE: 75 BPM | DIASTOLIC BLOOD PRESSURE: 66 MMHG | SYSTOLIC BLOOD PRESSURE: 117 MMHG

## 2025-08-20 VITALS
SYSTOLIC BLOOD PRESSURE: 80 MMHG | HEIGHT: 72 IN | OXYGEN SATURATION: 97 % | DIASTOLIC BLOOD PRESSURE: 63 MMHG | WEIGHT: 182.2 LBS | BODY MASS INDEX: 24.68 KG/M2 | HEART RATE: 95 BPM

## 2025-08-20 DIAGNOSIS — R06.09 DYSPNEA ON EXERTION: ICD-10-CM

## 2025-08-20 DIAGNOSIS — I95.9 HYPOTENSION, UNSPECIFIED HYPOTENSION TYPE: Primary | ICD-10-CM

## 2025-08-20 DIAGNOSIS — I44.4 LAFB (LEFT ANTERIOR FASCICULAR BLOCK): ICD-10-CM

## 2025-08-20 DIAGNOSIS — U07.1 COVID-19: ICD-10-CM

## 2025-08-20 DIAGNOSIS — R42 LIGHTHEADEDNESS: ICD-10-CM

## 2025-08-20 DIAGNOSIS — R07.89 CHEST HEAVINESS: Primary | ICD-10-CM

## 2025-08-20 DIAGNOSIS — I95.9 HYPOTENSIVE EPISODE: ICD-10-CM

## 2025-08-20 PROBLEM — K42.9 UMBILICAL HERNIA: Status: RESOLVED | Noted: 2017-10-25 | Resolved: 2025-08-20

## 2025-08-20 PROBLEM — K21.9 GASTROESOPHAGEAL REFLUX DISEASE: Status: RESOLVED | Noted: 2025-05-02 | Resolved: 2025-08-20

## 2025-08-20 PROBLEM — M25.569 KNEE PAIN: Status: RESOLVED | Noted: 2024-01-18 | Resolved: 2025-08-20

## 2025-08-20 PROBLEM — E11.65 TYPE 2 DIABETES MELLITUS WITH HYPERGLYCEMIA: Status: RESOLVED | Noted: 2024-01-23 | Resolved: 2025-08-20

## 2025-08-20 PROBLEM — I25.10 CORONARY ARTERY DISEASE INVOLVING NATIVE CORONARY ARTERY OF NATIVE HEART WITHOUT ANGINA PECTORIS: Status: RESOLVED | Noted: 2023-05-30 | Resolved: 2025-08-20

## 2025-08-20 PROBLEM — N40.0 BENIGN PROSTATIC HYPERPLASIA: Status: RESOLVED | Noted: 2020-02-21 | Resolved: 2025-08-20

## 2025-08-20 PROBLEM — I10 BENIGN ESSENTIAL HYPERTENSION: Status: RESOLVED | Noted: 2023-01-06 | Resolved: 2025-08-20

## 2025-08-20 PROBLEM — I25.10 CALCIFICATION OF CORONARY ARTERY: Status: RESOLVED | Noted: 2023-05-19 | Resolved: 2025-08-20

## 2025-08-20 PROBLEM — M25.569 KNEE PAIN: Status: RESOLVED | Noted: 2025-05-02 | Resolved: 2025-08-20

## 2025-08-20 LAB
ALBUMIN SERPL-MCNC: 3.6 G/DL (ref 3.5–5.2)
ALBUMIN/GLOB SERPL: 1.2 G/DL
ALP SERPL-CCNC: 156 U/L (ref 39–117)
ALT SERPL W P-5'-P-CCNC: 39 U/L (ref 1–41)
ANION GAP SERPL CALCULATED.3IONS-SCNC: 9.9 MMOL/L (ref 5–15)
AST SERPL-CCNC: 21 U/L (ref 1–40)
BASOPHILS # BLD AUTO: 0.04 10*3/MM3 (ref 0–0.2)
BASOPHILS NFR BLD AUTO: 0.4 % (ref 0–1.5)
BILIRUB SERPL-MCNC: 0.6 MG/DL (ref 0–1.2)
BUN SERPL-MCNC: 24 MG/DL (ref 8–23)
BUN/CREAT SERPL: 25.8 (ref 7–25)
CALCIUM SPEC-SCNC: 8.8 MG/DL (ref 8.6–10.5)
CHLORIDE SERPL-SCNC: 106 MMOL/L (ref 98–107)
CO2 SERPL-SCNC: 23.1 MMOL/L (ref 22–29)
CREAT SERPL-MCNC: 0.93 MG/DL (ref 0.76–1.27)
DEPRECATED RDW RBC AUTO: 43.6 FL (ref 37–54)
EGFRCR SERPLBLD CKD-EPI 2021: 85.6 ML/MIN/1.73
EOSINOPHIL # BLD AUTO: 0.17 10*3/MM3 (ref 0–0.4)
EOSINOPHIL NFR BLD AUTO: 1.5 % (ref 0.3–6.2)
ERYTHROCYTE [DISTWIDTH] IN BLOOD BY AUTOMATED COUNT: 12.9 % (ref 12.3–15.4)
GLOBULIN UR ELPH-MCNC: 3 GM/DL
GLUCOSE SERPL-MCNC: 135 MG/DL (ref 65–99)
HCT VFR BLD AUTO: 44.8 % (ref 37.5–51)
HGB BLD-MCNC: 14.6 G/DL (ref 13–17.7)
IMM GRANULOCYTES # BLD AUTO: 0.26 10*3/MM3 (ref 0–0.05)
IMM GRANULOCYTES NFR BLD AUTO: 2.3 % (ref 0–0.5)
LYMPHOCYTES # BLD AUTO: 1.85 10*3/MM3 (ref 0.7–3.1)
LYMPHOCYTES NFR BLD AUTO: 16.4 % (ref 19.6–45.3)
MCH RBC QN AUTO: 30.2 PG (ref 26.6–33)
MCHC RBC AUTO-ENTMCNC: 32.6 G/DL (ref 31.5–35.7)
MCV RBC AUTO: 92.8 FL (ref 79–97)
MONOCYTES # BLD AUTO: 1.03 10*3/MM3 (ref 0.1–0.9)
MONOCYTES NFR BLD AUTO: 9.1 % (ref 5–12)
NEUTROPHILS NFR BLD AUTO: 7.95 10*3/MM3 (ref 1.7–7)
NEUTROPHILS NFR BLD AUTO: 70.3 % (ref 42.7–76)
PLATELET # BLD AUTO: 64 10*3/MM3 (ref 140–450)
PMV BLD AUTO: 10.7 FL (ref 6–12)
POTASSIUM SERPL-SCNC: 5.1 MMOL/L (ref 3.5–5.2)
PROT SERPL-MCNC: 6.6 G/DL (ref 6–8.5)
RBC # BLD AUTO: 4.83 10*6/MM3 (ref 4.14–5.8)
SODIUM SERPL-SCNC: 139 MMOL/L (ref 136–145)
WBC NRBC COR # BLD AUTO: 11.3 10*3/MM3 (ref 3.4–10.8)

## 2025-08-20 PROCEDURE — 25810000003 SODIUM CHLORIDE 0.9 % SOLUTION: Performed by: NURSE PRACTITIONER

## 2025-08-20 PROCEDURE — 96374 THER/PROPH/DIAG INJ IV PUSH: CPT

## 2025-08-20 PROCEDURE — 85025 COMPLETE CBC W/AUTO DIFF WBC: CPT | Performed by: NURSE PRACTITIONER

## 2025-08-20 PROCEDURE — 36415 COLL VENOUS BLD VENIPUNCTURE: CPT

## 2025-08-20 PROCEDURE — 80053 COMPREHEN METABOLIC PANEL: CPT | Performed by: NURSE PRACTITIONER

## 2025-08-20 RX ADMIN — SODIUM CHLORIDE 1000 ML: 9 INJECTION, SOLUTION INTRAVENOUS at 14:48

## 2025-08-21 ENCOUNTER — TELEPHONE (OUTPATIENT)
Dept: CARDIOLOGY | Age: 75
End: 2025-08-21
Payer: MEDICARE

## 2025-08-29 ENCOUNTER — HOSPITAL ENCOUNTER (OUTPATIENT)
Dept: CARDIOLOGY | Facility: HOSPITAL | Age: 75
Discharge: HOME | End: 2025-08-29

## 2025-08-29 DIAGNOSIS — I10 ESSENTIAL HYPERTENSION: ICD-10-CM

## 2025-08-29 DIAGNOSIS — I44.4 LAFB (LEFT ANTERIOR FASCICULAR BLOCK): ICD-10-CM

## 2025-08-29 DIAGNOSIS — E78.2 MIXED HYPERLIPIDEMIA: ICD-10-CM

## 2025-08-29 PROCEDURE — 93799 UNLISTED CV SVC/PROCEDURE: CPT

## 2025-08-29 PROCEDURE — VASCULARSCN2 VASCULAR SCREENING (BUNDLE) CAR: Performed by: SURGERY

## (undated) DEVICE — STCKNT IMPERV 12IN STRL

## (undated) DEVICE — SUT VIC 2/0 X1 27IN J459H

## (undated) DEVICE — PK CATH CARD 40

## (undated) DEVICE — KT MANIFLD CARDIAC

## (undated) DEVICE — SUT ETHLN 2/0 PS 18IN 585H

## (undated) DEVICE — NDL HYPO ECLPS SFTY 18G 1 1/2IN

## (undated) DEVICE — KT ORCA ORCAPOD DISP STRL

## (undated) DEVICE — SPLNT PLSTR ORTHO 5IN

## (undated) DEVICE — Device: Brand: DEFENDO AIR/WATER/SUCTION AND BIOPSY VALVE

## (undated) DEVICE — BNDG ELAS ELITE V/CLOSE 4IN 5YD LF STRL

## (undated) DEVICE — SNAR POLYP SENSATION STDOVL 27 240 BX40

## (undated) DEVICE — SENSR O2 OXIMAX FNGR A/ 18IN NONSTR

## (undated) DEVICE — GLV SURG BIOGEL LTX PF 8

## (undated) DEVICE — BANDAGE,GAUZE,BULKEE II,4.5"X4.1YD,STRL: Brand: MEDLINE

## (undated) DEVICE — GLV SURG TRIUMPH CLASSIC PF LTX 7.5 STRL

## (undated) DEVICE — LN SMPL CO2 SHTRM SD STREAM W/M LUER

## (undated) DEVICE — GLIDESHEATH BASIC HYDROPHILIC COATED INTRODUCER SHEATH: Brand: GLIDESHEATH

## (undated) DEVICE — CATH VENT MIV RADL PIG ST TIP 4F 110CM

## (undated) DEVICE — UNDYED BRAIDED (POLYGLACTIN 910), SYNTHETIC ABSORBABLE SUTURE: Brand: COATED VICRYL

## (undated) DEVICE — PREMIUM WET SKIN PREP TRAY: Brand: MEDLINE INDUSTRIES, INC.

## (undated) DEVICE — CATH DIAG IMPULSE FL3.5 5F 100CM

## (undated) DEVICE — CATH DIAG IMPULSE FR4 5F 100CM

## (undated) DEVICE — TUBING, SUCTION, 1/4" X 10', STRAIGHT: Brand: MEDLINE

## (undated) DEVICE — DISPOSABLE TOURNIQUET CUFF SINGLE BLADDER, SINGLE PORT AND QUICK CONNECT CONNECTOR: Brand: COLOR CUFF

## (undated) DEVICE — KT BIOSUTURETAK SM JOINT 2.4MM DISP

## (undated) DEVICE — PROXIMATE RH ROTATING HEAD SKIN STAPLERS (35 WIDE) CONTAINS 35 STAINLESS STEEL STAPLES: Brand: PROXIMATE

## (undated) DEVICE — TOWEL,OR,DSP,ST,BLUE,STD,4/PK,20PK/CS: Brand: MEDLINE

## (undated) DEVICE — 3M™ STERI-STRIP™ REINFORCED ADHESIVE SKIN CLOSURES, R1547, 1/2 IN X 4 IN (12 MM X 100 MM), 6 STRIPS/ENVELOPE: Brand: 3M™ STERI-STRIP™

## (undated) DEVICE — CANN NASL CO2 TRULINK W/O2 A/

## (undated) DEVICE — SKIN PREP TRAY W/CHG: Brand: MEDLINE INDUSTRIES, INC.

## (undated) DEVICE — UNDERCAST PADDING: Brand: DEROYAL

## (undated) DEVICE — GW EMR FIX EXCHG J STD .035 3MM 260CM

## (undated) DEVICE — CANN O2 ETCO2 FITS ALL CONN CO2 SMPL A/ 7IN DISP LF

## (undated) DEVICE — ENCORE® LATEX ORTHO SIZE 8, STERILE LATEX POWDER-FREE SURGICAL GLOVE: Brand: ENCORE

## (undated) DEVICE — STCKNT IMPERV 9X36IN STRL

## (undated) DEVICE — INTENDED FOR TISSUE SEPARATION, AND OTHER PROCEDURES THAT REQUIRE A SHARP SURGICAL BLADE TO PUNCTURE OR CUT.: Brand: BARD-PARKER ® CARBON RIB-BACK BLADES

## (undated) DEVICE — THE TORRENT IRRIGATION SCOPE CONNECTOR IS USED WITH THE TORRENT IRRIGATION TUBING TO PROVIDE IRRIGATION FLUIDS SUCH AS STERILE WATER DURING GASTROINTESTINAL ENDOSCOPIC PROCEDURES WHEN USED IN CONJUNCTION WITH AN IRRIGATION PUMP (OR ELECTROSURGICAL UNIT).: Brand: TORRENT

## (undated) DEVICE — SUT VIC 3/0 SH 27IN J416H

## (undated) DEVICE — DRSNG GZ PETROLTM XEROFORM CURAD 1X8IN STRL

## (undated) DEVICE — BNDG ELAS ELITE V/CLOSE 6IN 5YD LF STRL

## (undated) DEVICE — SUT MNCRYL 3/0 PS2 18IN Y497G

## (undated) DEVICE — DRP C/ARM 41X74IN

## (undated) DEVICE — OCCLUSIVE GAUZE STRIP,3% BISMUTH TRIBROMOPHENATE IN PETROLATUM BLEND: Brand: XEROFORM

## (undated) DEVICE — GLV SURG SENSICARE PI PF LF 8.5 GRN STRL

## (undated) DEVICE — ERBE NESSY®PLATE 170 SPLIT; 168CM²; CABLE 3M: Brand: ERBE

## (undated) DEVICE — PK ORTHO MINOR TOWER 40

## (undated) DEVICE — SUT VIC 2/0 CT1 CR8 18IN J839D

## (undated) DEVICE — ADAPT CLN BIOGUARD AIR/H2O DISP

## (undated) DEVICE — THE SINGLE USE ETRAP – POLYP TRAP IS USED FOR SUCTION RETRIEVAL OF ENDOSCOPICALLY REMOVED POLYPS.: Brand: ETRAP

## (undated) DEVICE — PAD,ABDOMINAL,8"X10",ST,LF: Brand: MEDLINE